# Patient Record
Sex: MALE | Race: WHITE | NOT HISPANIC OR LATINO | Employment: OTHER | URBAN - METROPOLITAN AREA
[De-identification: names, ages, dates, MRNs, and addresses within clinical notes are randomized per-mention and may not be internally consistent; named-entity substitution may affect disease eponyms.]

---

## 2017-04-24 ENCOUNTER — OFFICE VISIT (OUTPATIENT)
Dept: FAMILY MEDICINE | Facility: CLINIC | Age: 57
End: 2017-04-24
Payer: MEDICARE

## 2017-04-24 VITALS
HEIGHT: 78 IN | OXYGEN SATURATION: 98 % | WEIGHT: 162 LBS | HEART RATE: 89 BPM | DIASTOLIC BLOOD PRESSURE: 78 MMHG | TEMPERATURE: 98.7 F | SYSTOLIC BLOOD PRESSURE: 120 MMHG | RESPIRATION RATE: 18 BRPM | BODY MASS INDEX: 18.74 KG/M2

## 2017-04-24 DIAGNOSIS — M62.830 BACK MUSCLE SPASM: ICD-10-CM

## 2017-04-24 DIAGNOSIS — M54.40 CHRONIC MIDLINE LOW BACK PAIN WITH SCIATICA, SCIATICA LATERALITY UNSPECIFIED: ICD-10-CM

## 2017-04-24 DIAGNOSIS — G89.29 CHRONIC MIDLINE LOW BACK PAIN WITH SCIATICA, SCIATICA LATERALITY UNSPECIFIED: ICD-10-CM

## 2017-04-24 DIAGNOSIS — M54.16 LUMBAR RADICULOPATHY: ICD-10-CM

## 2017-04-24 PROCEDURE — 99214 OFFICE O/P EST MOD 30 MIN: CPT | Performed by: FAMILY MEDICINE

## 2017-04-24 RX ORDER — OXYCODONE HYDROCHLORIDE AND ASPIRIN 4.8355; 325 MG/1; MG/1
1 TABLET ORAL EVERY 4 HOURS PRN
Qty: 90 TABLET | Refills: 0 | Status: SHIPPED | OUTPATIENT
Start: 2017-04-24 | End: 2019-01-17

## 2017-04-24 ASSESSMENT — PAIN SCALES - GENERAL: PAINLEVEL: NO PAIN (0)

## 2017-04-24 NOTE — NURSING NOTE
"Chief Complaint   Patient presents with     Back Pain       Initial /78  Pulse 89  Temp 98.7  F (37.1  C) (Tympanic)  Resp 18  Ht 6' 6.1\" (1.984 m)  Wt 162 lb (73.5 kg)  SpO2 98%  BMI 18.67 kg/m2 Estimated body mass index is 18.67 kg/(m^2) as calculated from the following:    Height as of this encounter: 6' 6.1\" (1.984 m).    Weight as of this encounter: 162 lb (73.5 kg).  BP completed using cuff size: sindhu Israel MA      "

## 2017-04-24 NOTE — MR AVS SNAPSHOT
"              After Visit Summary   4/24/2017    Gomez Gutierrez    MRN: [id number]           Patient Information     Date Of Birth          1960        Visit Information        Provider Department      4/24/2017 5:20 PM Francis Lane MD [facility]        Today's Diagnoses     Back muscle spasm        Chronic midline low back pain with sciatica, sciatica laterality unspecified        Lumbar radiculopathy           Follow-ups after your visit        Who to contact     If you have questions or need follow up information about today's clinic visit or your schedule please contact [facility] directly at [phone].  Normal or non-critical lab and imaging results will be communicated to you by [employer], letter or phone within 4 business days after the clinic has received the results. If you do not hear from us within 7 days, please contact the clinic through [employer] or phone. If you have a critical or abnormal lab result, we will notify you by phone as soon as possible.  Submit refill requests through [employer] or call your pharmacy and they will forward the refill request to us. Please allow 3 business days for your refill to be completed.          Additional Information About Your Visit        MyChart Information     [employer] gives you secure access to your electronic health record. If you see a primary care provider, you can also send messages to your care team and make appointments. If you have questions, please call your primary care clinic.  If you do not have a primary care provider, please call [phone] and they will assist you.        Care EveryWhere ID     This is your Care EveryWhere ID. This could be used by other organizations to access your [facility] medical records  [id number]        Your Vitals Were     Pulse Temperature Respirations Height Pulse Oximetry BMI (Body Mass Index)    89 98.7  F (37.1  C) (Tympanic) 18 6' 6.1\" (1.984 m) 98% 18.67 kg/m2       Blood " Pressure from Last 3 Encounters:   04/24/17 120/78   08/17/16 110/60   02/10/15 124/84    Weight from Last 3 Encounters:   04/24/17 162 lb (73.5 kg)   08/17/16 163 lb (73.9 kg)   02/10/15 162 lb (73.5 kg)              Today, you had the following     No orders found for display         Where to get your medicines      Some of these will need a paper prescription and others can be bought over the counter.  Ask your nurse if you have questions.     Bring a paper prescription for each of these medications     oxyCODONE-aspirin 4.8355-325 MG per tablet          Primary Care Provider Office Phone # Fax #    Francis Lane [phone]       [facility] [address] [place] [address]        Thank you!     Thank you for choosing [facility]  for your care. Our goal is always to provide you with excellent care. Hearing back from our patients is one way we can continue to improve our services. Please take a few minutes to complete the written survey that you may receive in the mail after your visit with us. Thank you!             Your Updated Medication List - Protect others around you: Learn how to safely use, store and throw away your medicines at www.disposemymeds.org.          This list is accurate as of: 4/24/17 11:59 PM.  Always use your most recent med list.                   Brand Name Dispense Instructions for use    ALEVE 220 MG tablet   Generic drug:  naproxen sodium      1 TABLET EVERY 12 HOURS AS NEEDED       oxyCODONE-aspirin 4.8355-325 MG per tablet    PERCODAN    90 tablet    Take 1 tablet by mouth every 4 hours as needed for pain       tiZANidine 4 MG capsule    ZANAFLEX    90 capsule    1 TABLET 3 TIMES DAILY

## 2017-05-03 NOTE — PROGRESS NOTES
Previous work comp , Mr. Simon, had him sign off on this case back when the patient was under the influence of narcotic pain medications and within days of his mother dying.  The patient was still having significant issues with pain and lumbar radiculopathy at the time.  See the letter below that I send to his [facility] worker on 3/3/05.  [address] [place]  [address]  Tel. [phone]    March 3, 2005        Dionne Wilkerson, [facility]    RE:  Gomez Gutierrez  [address] [place] [address]          Dear Ms. Wilkerson,    This letter is in regards to Gomez Gutierrez, a patient of [facility] whom you are familiar with  had a workers comp injury stemming from June 6, 2001 that has caused him persistent problems to this date.  The patient, as you know,  has undergone lumbar surgery per Neurosurgery's recommendations, but despite this has continued to have low back pain with radiculopathy.  I spent a prolonged period of time with Mr. Gutierrez recently in the clinic reviewing his medical records with him.  I spent an additional hour after he was gone reviewing MRI scans and letters from various consulting physicians.      It is my professional opinion that Mr. Gutierrez's herniated disc and nerve compromise at the L3, L4 level is a direct result of his initial injury back in June 2001.  It is clear by the radiographs and MRI reports that I have reviewed that he had an L3, L4 disc injury with herniation within five months of his initial MRI scan that was done in June of 2001.      I have reviewed a number of consultation notes from various physicians that have come to the same conclusion. In fact, on May 17, 2004, Dr. Bassem Mendoza recommended that Mr. Gutierrez have a microdiskectomy at the L3, L4 level to relieve pressure off the nerve that is being impinged by the disc herniation.  Despite that neurosurgical recommendation, no surgery has been approved by workers comp in order to  alleviate this gentleman's pain.      It is true that on his initial MRI scan that was done in June 2001 that there was not a true herniation at the L3, L4, but there was a high signal intensity suggesting an annular tear in that disc, so an injury was present.  It was not until later on his MRI scan dated November 2001 that the disc herniation was present.      The fact that there was no nerve impingement at that time does not mean that this current impingement does not stem back from that initial injury in June 2001.      I am in full support of this patient and believe he is truly in pain and has clinical symptoms supporting radiculopathy from nerve impingement of L3, L4 deserving of surgery to help relieve his symptoms.        Page 2    If there is further information that I may help you with, I would be more than willing to help supply that for you.    Along with this letter I am sending my clinic dictation from February 11, 2005 for your review.  I hope this along with the neurosurgical consultation note from Dr. Mendoza will be helpful in allowing workers comp to allow this patient the surgery that is needed, an injury ultimately resulting in a herniation of the L3-L4 disc and nerve compression.  This clearly is stemming from his initial injury back in June 2001.          Sincerely,      Francis Lane MD      Patient seen, note dictated, (ID#[id number]).  Electronically signed by:  Francis Lane M.D.  5/3/2017    Letter dictated, (ID#[id number]).  Electronically signed by:  Francis Lane M.D.  5/3/2017

## 2017-05-04 NOTE — PROGRESS NOTES
SUBJECTIVE:  Gomez Gutierrez is in today for follow-up of his Workmen's Comp issue.  This is a gentleman whom I have known for many years, and has had issues with his back since 2001.  He was working, for the [employer] at that time, and has had regular routine follow-ups.  Unfortunately, Gomez's surgery that was done, was not done at the proper level, and he has had permanent nerve damage from the nerve that was never decompressed.  His initial laminectomy was at L4-5.  He had another laminectomy at L3-L4 in 2007, and then a repeat laminectomy and discectomy in 11/2010.  He had been living in [place], but comes back to [place] two or three times a year, and has been seeing Dr. oNbles at the [facility].  He had some procedures with Dr. Nobles in 2015 with decompression of the area again, but unfortunately, Gomez has had some permanent nerve damage on that left side and has significant atrophy of the left lower extremity musculature, particularly in the calf and shin region.  He is very hyperreflexive secondary to nerve damage and he has a permanent foot drop on the left.  He continues to have pain with his back.  He has a new Workmen's Comp  that is working with him here in [place] to try to get this all settled, but there have been a lot of issues that have gone on over the years that they are trying to sort out.  He definitely needs an AFO for his left foot but works for a [employer] continues to deny this.  Unfortunately, because of foot drop, he is tripping and falling and has had a couple of ER visits in [place] because of this.  One of his last falls was shortly after seeing me at his last visit in 08/2016, and he actually still had abrasions and bruising on his left hip and shoulder from that fall.  He does not do a lot outside of the home now, just because of his fear of falling, and does not want to have any further injuries or break any other bones.  He currently does use  medication for pain, very sparingly, using Zanaflex 4 mg up to three times a day for muscle spasms, and he has Percodan/oxycodone and aspirin at 4.8/325 per tablet, and he takes 1 up to every 4 hours, but 90 tablets will last him upwards of six months, so he does not use it very often.  He does use some Aleve 1 tablet every 12 hours to help with some of the inflammatory pain.      His status really has not changed since I saw him back in August.  He continues to have the left lower back pain with muscle spasms and lumbar radiculopathy down that left foot with the foot drop.  He continues to be exceedingly frustrated and has done a good job of documenting everything that he has done at clinics both here in [place].  He has documentation of everything that has ever been done to him, dates and times, and that has all been kept in a file for him at home.      OBJECTIVE:  On exam today, he continues to have significant atrophy of the calf and anterior shin muscles of the left lower extremity.  He does not have any dorsiflexion of the foot with some plantar flexion, so a permanent foot drop.  He is hyperreflexive on the left when compared to the right.      PLAN:  I gave him one more refill of his Percodan and tizanidine refills for the year.  He and his wife are moving back to the [place] area, and then will split their time between [place] and [place].  I did get a request from his new , Samy Anderson, , [address] [place] [address], [address], [place] [address].  Telephone number [phone].  Fax is [phone].  Email (website) is [employer].  I will dictate a letter to Mr. Anderson regarding the request that was sent to me.      TIME:  I spent 30 minutes with the patient and his wife discussing his situation, and spent more than 50% of the time discussing his chronic pain and foot drop.         AIDEE MENDEZ MD             D: 05/03/2017 11:30   T: 05/04/2017 07:00   MT: [id number]       Name:     TORIBIO TRAN   MRN:      [id number]        Account:      [id number]   :      1960           Visit Date:   2017      Document: [id number]

## 2017-05-04 NOTE — PROGRESS NOTES
May 3, 2017      Samy Anderson,    [address]   [facility] [address]      RE:  Gomez Gutierrez   # .   :  1960      Dear  Justin:      I received your letter requesting information regarding Gomez Gutierrez who has been a patient of mine for many years.  I have been seeing Mr. Gutierrez since his initial Workman's Compensation injury in .  He actually started care with one of my partners CAMERON Viveros, but transferred to me shortly afterwards.  I have been seeing him since.      Regarding his diagnosis Mr. Gutierrez has chronic midline low back pain with lumbar radiculopathy down the left.  He has permanent nerve damage to the lumbar nerve affecting his lower extremity and has significant atrophy of the calf muscles which has resulted in a permanent foot drop on the left.  He has no strength in dorsiflexion and has some plantar flexion.  The atrophy of the muscles on the left is significant when compared to the right leg, and this is a direct result of delayed decompression of one of the lumbar nerves.        His initial work injury was on  when he was working at the [employer].  He has had routine and regular follow-ups, but unfortunately it was discovered that the initial laminectomy that was done at L4-L5 should have been at the level above that at L3-L4.  Unfortunately the second surgery was delayed to the point where he suffered permanent nerve damage of that nerve and has never regained nerve function after the second laminectomy was done.  Mr. Gutierrez has kept incredible records of all of his surgical procedures, all of his consultations with the various spine clinics and doctors and recommendations that were made and even has documentation of conversations with spine surgeons saying that the initial decompression was at the wrong level and should have been done at the level above that.  I would ask that you  obtain those records from Mr. Gutierrez because he has kept impeccable records over the last 16 years.        He has had numerous surgical procedures done over the years.  The initial decompression was done in January 2002 at L4-SL51.  He then had a laminectomy/diskectomy done at L3-L4 in October 2006 and then had a repeat laminectomy/diskectomy done at L4-L5 in November 2010.  He has had surgery done by Dr. Nobles at the [facility] in November 2015 to clean out scar tissue and bone spurs to help alleviate some of his pain, but he has never regained any of the nerve function in his left foot and calf muscles.  He has undergone multiple evaluations in Physical Therapy and Rehabilitation throughout the years.        I saw Mr. Gutierrez in August 2016 at which time I agreed with Dr. Nobles's recommendation for an AFO brace for Mr. Gutierrez's left foot.  The patient has the permanent foot drop which does cause him to trip and stumble, and he has had a number of falls over the past years secondary to this.  Unfortunately his Workman's Compensation carrier has denied the AFO claim, and he has still not been able to obtain an AFO.      As to prescriptions for Mr. Gutierrez, he has been managed primarily with muscle relaxants.  His latest is Tizanidine 4 mg up to 3 times a day as needed.  He typically gets 90 capsules at a time, and those last him a number of months before he needs refills.  He also gets oxycodone and aspirin or Percodan and takes 1 tablet up to every 4 hours as needed for pain.  A 90-tablet supply will often last him six months.  He uses an over-the-counter anti-inflammatory such as Aleve to help control some of his pain, but essentially he has just learned to deal with his chronic pain and has never become dependent on narcotics or chronic pain medications.      In my professional opinion the patient's permanent nerve damage and foot drop are directly related to his work injury in 2001.  I think he was  mismanaged surgically.  If things had been addressed judiciously and in a proper time frame I believe Mr. Gutierrez would not have the permanent nerve damage that he has.      He has very well-documented evaluations and consultations from various back surgeons regarding this same issue, and I think the consensus from all of us is that if it had been managed properly in [address]44-[address]33 a lot of this permanent nerve injury could have been avoided.  He has seen me once every 6 months for the past 10-12 years at least for follow-ups and documentation of his progress or lack of progress and for refills of his medications.  This care and treatment has been necessary and reasonable to help relieve some of his pain and discomfort.  In no way has it been used to try to cure him of his problems because I think that now that he has the permanent atrophy of the muscles secondary to nerve damage there is no cure for him.  We just need to try to manage the situation better than we have in the past.        If you have further questions for me or if you need more clarification of this letter, please feel free to contact me at my office number which is [phone] or by Fax at [phone].         Sincerely,      AIDEE MENDEZ MD             D: 2017 11:41   T: 2017 05:46   MT: [id number]      Name:     TORIBIO GUTIERREZ   MRN:      [id number]        Account:      [id number]   :      1960      Document: [id number]

## 2017-05-16 ENCOUNTER — OFFICE VISIT (OUTPATIENT)
Dept: FAMILY MEDICINE | Facility: CLINIC | Age: 57
End: 2017-05-16
Payer: OTHER MISCELLANEOUS

## 2017-05-16 DIAGNOSIS — M21.372 FOOT DROP, LEFT: ICD-10-CM

## 2017-05-16 DIAGNOSIS — M54.42 CHRONIC MIDLINE LOW BACK PAIN WITH LEFT-SIDED SCIATICA: Primary | ICD-10-CM

## 2017-05-16 DIAGNOSIS — G89.29 CHRONIC MIDLINE LOW BACK PAIN WITH LEFT-SIDED SCIATICA: Primary | ICD-10-CM

## 2017-05-16 DIAGNOSIS — M54.16 LUMBAR RADICULOPATHY: ICD-10-CM

## 2017-05-16 DIAGNOSIS — M79.2 NEUROPATHIC PAIN SYNDROME (NON-HERPETIC): ICD-10-CM

## 2017-05-16 PROCEDURE — 99080 SPECIAL REPORTS OR FORMS: CPT | Performed by: FAMILY MEDICINE

## 2017-05-16 NOTE — MR AVS SNAPSHOT
After Visit Summary   5/16/2017    Gomez Gutierrez    MRN: [id number]           Patient Information     Date Of Birth          1960        Visit Information        Provider Department      5/16/2017 11:10 AM Francis Lane MD [facility]        Today's Diagnoses     Chronic midline low back pain with left-sided sciatica    -  1    Lumbar radiculopathy        Neuropathic pain syndrome (non-herpetic)        Foot drop, left           Follow-ups after your visit        Who to contact     If you have questions or need follow up information about today's clinic visit or your schedule please contact [facility] directly at [phone].  Normal or non-critical lab and imaging results will be communicated to you by MyChart, letter or phone within 4 business days after the clinic has received the results. If you do not hear from us within 7 days, please contact the clinic through [employer] or phone. If you have a critical or abnormal lab result, we will notify you by phone as soon as possible.  Submit refill requests through [employer] or call your pharmacy and they will forward the refill request to us. Please allow 3 business days for your refill to be completed.          Additional Information About Your Visit        MyChart Information     [employer] gives you secure access to your electronic health record. If you see a primary care provider, you can also send messages to your care team and make appointments. If you have questions, please call your primary care clinic.  If you do not have a primary care provider, please call [phone] and they will assist you.        Care EveryWhere ID     This is your Care EveryWhere ID. This could be used by other organizations to access your [facility] medical records  [id number]         Blood Pressure from Last 3 Encounters:   04/24/17 120/78   08/17/16 110/60   02/10/15 124/84    Weight from Last 3 Encounters:   04/24/17 162 lb (73.5  kg)   08/17/16 163 lb (73.9 kg)   02/10/15 162 lb (73.5 kg)              We Performed the Following     SPECIAL REPORTS OR FORMS        Primary Care Provider Office Phone # Fax #    Francis Lane [phone]       [facility] [address] [place] [address]        Thank you!     Thank you for choosing [facility]  for your care. Our goal is always to provide you with excellent care. Hearing back from our patients is one way we can continue to improve our services. Please take a few minutes to complete the written survey that you may receive in the mail after your visit with us. Thank you!             Your Updated Medication List - Protect others around you: Learn how to safely use, store and throw away your medicines at www.disposemymeds.org.          This list is accurate as of: 5/16/17  4:51 PM.  Always use your most recent med list.                   Brand Name Dispense Instructions for use    ALEVE 220 MG tablet   Generic drug:  naproxen sodium      1 TABLET EVERY 12 HOURS AS NEEDED       oxyCODONE-aspirin 4.8355-325 MG per tablet    PERCODAN    90 tablet    Take 1 tablet by mouth every 4 hours as needed for pain       tiZANidine 4 MG capsule    ZANAFLEX    90 capsule    1 TABLET 3 TIMES DAILY

## 2017-05-16 NOTE — PROGRESS NOTES
Letter sent for his .  I spent 30-40 minutes coordinating the content of the letter and dictating it for the patient's .    Electronically signed by:  Francis Lane M.D.  5/16/2017

## 2018-11-27 ENCOUNTER — TELEPHONE (OUTPATIENT)
Dept: FAMILY MEDICINE | Facility: CLINIC | Age: 58
End: 2018-11-27

## 2018-11-27 NOTE — TELEPHONE ENCOUNTER
Reason for Call:  Other appointment    Detailed comments: patient states he has a history with Dr. Lane and is now back from living in [place]. Patient does have an appointment in January to re establish care. Patient is having back issues and would like to be seen this week if possible, states that he is in a lot of pain, please call and advise      Phone Number Patient can be reached at: Cell number on file:    Telephone Information:   Mobile [phone]       Best Time: any    Can we leave a detailed message on this number? YES    Call taken on 11/27/2018 at 1:26 PM by Noelle Pierre

## 2018-11-28 NOTE — TELEPHONE ENCOUNTER
I really do not have anything this week due to my surgery schedule.  The earliest I could see him would be next Wednesday 11/5/18 during my procedure time.  I would need to have those procedure times blocked for him (10:40 and 11:00 am slots.)    Electronically signed by:  Francis Lane M.D.  11/27/2018

## 2018-11-28 NOTE — TELEPHONE ENCOUNTER
Called and LM for patient to call back. Appointment was made for 11/5/2018 @ 11:00am. Asked patient to call back to confirm this appointment works for him.   Betty Pompa MA

## 2018-11-29 ENCOUNTER — TRANSFERRED RECORDS (OUTPATIENT)
Dept: HEALTH INFORMATION MANAGEMENT | Facility: CLINIC | Age: 58
End: 2018-11-29

## 2018-12-05 ENCOUNTER — OFFICE VISIT (OUTPATIENT)
Dept: FAMILY MEDICINE | Facility: CLINIC | Age: 58
End: 2018-12-05
Payer: MEDICARE

## 2018-12-05 ENCOUNTER — DOCUMENTATION ONLY (OUTPATIENT)
Dept: FAMILY MEDICINE | Facility: CLINIC | Age: 58
End: 2018-12-05

## 2018-12-05 VITALS
RESPIRATION RATE: 18 BRPM | HEIGHT: 78 IN | SYSTOLIC BLOOD PRESSURE: 102 MMHG | HEART RATE: 70 BPM | OXYGEN SATURATION: 98 % | WEIGHT: 166 LBS | TEMPERATURE: 98.2 F | BODY MASS INDEX: 19.21 KG/M2 | DIASTOLIC BLOOD PRESSURE: 68 MMHG

## 2018-12-05 DIAGNOSIS — Z76.89 ENCOUNTER TO ESTABLISH CARE: Primary | ICD-10-CM

## 2018-12-05 DIAGNOSIS — H40.003 GLAUCOMA SUSPECT, BILATERAL: ICD-10-CM

## 2018-12-05 DIAGNOSIS — Z13.1 SCREENING FOR DIABETES MELLITUS: ICD-10-CM

## 2018-12-05 DIAGNOSIS — M54.42 CHRONIC MIDLINE LOW BACK PAIN WITH LEFT-SIDED SCIATICA: ICD-10-CM

## 2018-12-05 DIAGNOSIS — M21.372 FOOT DROP, LEFT: ICD-10-CM

## 2018-12-05 DIAGNOSIS — Z13.220 LIPID SCREENING: ICD-10-CM

## 2018-12-05 DIAGNOSIS — Z12.5 SCREENING FOR PROSTATE CANCER: ICD-10-CM

## 2018-12-05 DIAGNOSIS — Z87.891 PERSONAL HISTORY OF NICOTINE DEPENDENCE: ICD-10-CM

## 2018-12-05 DIAGNOSIS — G89.29 CHRONIC MIDLINE LOW BACK PAIN WITH LEFT-SIDED SCIATICA: ICD-10-CM

## 2018-12-05 PROCEDURE — 99215 OFFICE O/P EST HI 40 MIN: CPT | Performed by: FAMILY MEDICINE

## 2018-12-05 ASSESSMENT — PAIN SCALES - GENERAL: PAINLEVEL: MODERATE PAIN (4)

## 2018-12-05 ASSESSMENT — ANXIETY QUESTIONNAIRES
3. WORRYING TOO MUCH ABOUT DIFFERENT THINGS: NOT AT ALL
5. BEING SO RESTLESS THAT IT IS HARD TO SIT STILL: NOT AT ALL
1. FEELING NERVOUS, ANXIOUS, OR ON EDGE: NOT AT ALL
GAD7 TOTAL SCORE: 0
2. NOT BEING ABLE TO STOP OR CONTROL WORRYING: NOT AT ALL
IF YOU CHECKED OFF ANY PROBLEMS ON THIS QUESTIONNAIRE, HOW DIFFICULT HAVE THESE PROBLEMS MADE IT FOR YOU TO DO YOUR WORK, TAKE CARE OF THINGS AT HOME, OR GET ALONG WITH OTHER PEOPLE: NOT DIFFICULT AT ALL
7. FEELING AFRAID AS IF SOMETHING AWFUL MIGHT HAPPEN: NOT AT ALL
6. BECOMING EASILY ANNOYED OR IRRITABLE: NOT AT ALL

## 2018-12-05 ASSESSMENT — PATIENT HEALTH QUESTIONNAIRE - PHQ9
5. POOR APPETITE OR OVEREATING: NOT AT ALL
SUM OF ALL RESPONSES TO PHQ QUESTIONS 1-9: 1

## 2018-12-05 NOTE — PROGRESS NOTES
"  SUBJECTIVE:   Gomez Gutierrez is a 58 year old male who presents to clinic today for the following health issues:      ED/UC Followup:    Facility:  [place]  Date of visit: 11/29/2018  Reason for visit: Back Pain  Current Status: stable         PROBLEMS TO ADD ON...  45 pack year history of smoking, had some \"spots\" on his lungs on xray.    Was diagnosed with glaucoma but not using drops anymore and has not had any follow-up with eye doctor.  He was also told that he was borderline diabetic but has not had any follow-up for that either.  Back has been stable, but continues to have chronic pain.  He does not use any regular opioids for pain management.  He had a prescription for Percodan back in April 2017 and still has a few tablets at home from that prescription but has not needed refills in over a year.  He does have some muscle relaxants that he does use periodically.  He had an ED visit in [place] for his right hip.  He had sneezed and felt like he threw his hip out.  He went in and received a Toradol injection and his pain improved.  He has had no more issues since this.    Problem list and histories reviewed & adjusted, as indicated.  Additional history: as documented    Patient Active Problem List   Diagnosis     Tobacco use disorder     Psychosexual dysfunction with inhibited sexual excitement     iamLUMBAR DISC DISPLACEMENT     iamPOSTSURGICAL STATES NEC     Obstructive sleep apnea     Neuropathic pain syndrome (non-herpetic)     CARDIOVASCULAR SCREENING; LDL GOAL LESS THAN 160     Foot drop, left     Lumbar radiculopathy     Chronic midline low back pain with left-sided sciatica     Past Surgical History:   Procedure Laterality Date     BACK SURGERY  11/2015    cleaned out bone spurs     C ECHO HEART XTHORACIC,COMPLETE, W/O DOPPLER  02/04/07    normal cardiac stress echo test     C ESTEBAN W/O FACETEC FORAMOT/DSKC 1/2 VRT SEG, LUMBAR  1/02    L4-5     C REPAIR LUMBAR HERNIA  1986    L5-4 L5-S1     " "HC COLONOSCOPY W BIOPSY  11/05/09     HC REMOVE TONSILS/ADENOIDS,<13 Y/O      T & A <11y.o.     LAMINECT/DISCECTOMY, LUMBAR  10/19/06    L3-4     LAMINECT/DISCECTOMY, LUMBAR  11/10    left sided L4-5     REMOVAL OF SPERM DUCT(S)      Vasectomy     ROTATOR CUFF REPAIR RT/LT  3/12    right sided       Social History   Substance Use Topics     Smoking status: Current Every Day Smoker     Packs/day: 1.00     Years: 45.00     Types: Cigarettes     Smokeless tobacco: Never Used      Comment:  wants to quit     Alcohol use 5.0 oz/week      Comment: 1 beer every 3 wks     Family History   Problem Relation Age of Onset     Respiratory Mother      emphysema     Arthritis Father      Depression Father      Heart Failure Father 80     CHF         Current Outpatient Prescriptions   Medication Sig Dispense Refill     ALEVE 220 MG OR TABS 1 TABLET EVERY 12 HOURS AS NEEDED       oxyCODONE-aspirin (PERCODAN) 4.8355-325 MG per tablet Take 1 tablet by mouth every 4 hours as needed for pain 90 tablet 0     tiZANidine (ZANAFLEX) 4 MG capsule 1 TABLET 3 TIMES DAILY 90 capsule 6     Allergies   Allergen Reactions     No Known Drug Allergies      No lab results found.   BP Readings from Last 3 Encounters:   12/05/18 102/68   04/24/17 120/78   08/17/16 110/60    Wt Readings from Last 3 Encounters:   12/05/18 166 lb (75.3 kg)   04/24/17 162 lb (73.5 kg)   08/17/16 163 lb (73.9 kg)                    Reviewed and updated as needed this visit by clinical staff  Tobacco  Allergies  Meds       Reviewed and updated as needed this visit by Provider         ROS:  Constitutional, HEENT, cardiovascular, pulmonary, gi and gu systems are negative, except as otherwise noted.    OBJECTIVE:     /68  Pulse 70  Temp 98.2  F (36.8  C) (Temporal)  Resp 18  Ht 6' 5.5\" (1.969 m)  Wt 166 lb (75.3 kg)  SpO2 98%  BMI 19.43 kg/m2  Body mass index is 19.43 kg/(m^2).  GENERAL: healthy, alert and no distress  NECK: no adenopathy, no asymmetry, masses, " or scars and thyroid normal to palpation  RESP: lungs clear to auscultation - no rales, rhonchi or wheezes  CV: regular rate and rhythm, normal S1 S2, no S3 or S4, no murmur, click or rub, no peripheral edema and peripheral pulses strong  MS: no gross musculoskeletal defects noted, no edema    Diagnostic Test Results:  Labs are pending from today.    ASSESSMENT/PLAN:   (Z76.89) Encounter to establish care  (primary encounter diagnosis)  Comment: Patient is reestablishing care.  He had moved down to the [place] area for a number of years and has seen me periodically ever since but now is back to [facility] and wants to reestablish care with me as his primary provider.  Plan: See new issues below.    (H40.003) Glaucoma suspect, bilateral  Comment: He was diagnosed with glaucoma and has stopped using his eyedrops.  His vision does not seem to be impaired but I explained to him that glaucoma is a silent disease and can cause blindness if gone untreated.  Plan: OPHTHALMOLOGY ADULT REFERRAL        We will make referral to the ophthalmology clinic so that he can be reevaluated and started back on appropriate therapy.    (Z13.1) Screening for diabetes mellitus  Comment: He is borderline diabetic down in [place] so needs follow-up for this.  His mother was diabetic.  Plan: Basic metabolic panel        Labs are pending today.    (Z13.220) Lipid screening  Comment: These were drawn a basic profile on him he has not had a screening lipid for a number of years so we will do that today also.  He is fasting today.  Plan: Lipid panel reflex to direct LDL Fasting        Labs are pending    (Z12.5) Screening for prostate cancer  Comment: He has never had a PSA screen so again since we are drawing labs today he is willing to do that today also.  Plan: PSA, SCREENING        Drawn.    (Z87.891) Personal history of nicotine dependence   Comment: Patient has been smoking for 45+ years and is at least a pack per day sometimes  "heavier.  Done in [place] they were following a chest x-ray on him that showed some spots on his lungs according to he and his wife.  He has never had a CT of his chest.  Plan: CT Chest Lung Cancer Scrn Low Dose wo        We discussed that because of his smoking history that is greater than 60-vibf-ibpi history and he is more than 55 years of age he would qualify for a yearly CT of his chest to screen for lung cancer.  We will get his initial one set up  to be done in the near future.    (M54.42,  G89.29) Chronic midline low back pain with left-sided sciatica  (M21.372) Foot drop, left  Comment: His pain and foot drop have not changed and are stable at this point.  He has got permanent nerve damage that is because that left foot drop.  He is essentially off narcotics and uses them very rarely just for excruciating pain.  Plan: Down in [place] he did use oral forms of medical marijuana which was legal and he still is using some of the topical oils which does seem to help.  He is going to look into medical marijuana clinic in [place] and reevaluate to see if he would qualify for it here in [place].     Tobacco Cessation:   reports that he has been smoking Cigarettes.  He has a 45.00 pack-year smoking history. He has never used smokeless tobacco.  Tobacco Cessation Action Plan: Information offered: Patient not interested at this time    BMI:   Estimated body mass index is 19.43 kg/(m^2) as calculated from the following:    Height as of this encounter: 6' 5.5\" (1.969 m).    Weight as of this encounter: 166 lb (75.3 kg).       Electronically signed by:  Francis Lane M.D.  12/5/2018    "

## 2018-12-05 NOTE — MR AVS SNAPSHOT
After Visit Summary   12/5/2018    Gomez Gutierrez    MRN: [id number]           Patient Information     Date Of Birth          1960        Visit Information        Provider Department      12/5/2018 11:00 AM Francis Lane MD [facility]        Today's Diagnoses     Encounter to establish care    -  1    Glaucoma suspect, bilateral        Screening for diabetes mellitus        Lipid screening        Screening for prostate cancer        Personal history of nicotine dependence         Chronic midline low back pain with left-sided sciatica        Foot drop, left           Follow-ups after your visit        Additional Services     OPHTHALMOLOGY ADULT REFERRAL       Your provider has referred you to: N: [facility] [phone]  [email or web address]    Please be aware that coverage of these services is subject to the terms and limitations of your health insurance plan.  Call member services at your health plan with any benefit or coverage questions.      Please bring the following with you to your appointment:    (1) Any X-Rays, CTs or MRIs which have been performed.  Contact the facility where they were done to arrange for  prior to your scheduled appointment.    (2) List of current medications  (3) This referral request   (4) Any documents/labs given to you for this referral                  Your next 10 appointments already scheduled     Dec 11, 2018  1:15 PM CST   CT CHEST LUNG CANCER SCREEN with PHCT1   [facility] CT Scan ([facility])    [address] [place] [id number] [phone]           How do I prepare for my exam? (Food and drink instructions) No Food and Drink Restrictions.  How do I prepare for my exam? (Other instructions) You do not need to do anything special to prepare for this exam. For a sinus scan: Use your nose spray (nasal decongestant spray) as directed.  What  should I wear: Please wear loose clothing, such as a sweat suit or jogging clothes. Avoid snaps, zippers and other metal. We may ask you to undress and put on a hospital gown.  How long does the exam take: Most scans take less than 20 minutes.  What should I bring: Please bring any scans or X-rays taken at other hospitals, if similar tests were done. Also bring a list of your medicines, including vitamins, minerals and over-the-counter drugs. It is safest to leave personal items at home.  Do I need a : No  is needed.  What do I need to tell my doctor? Be sure to tell your doctor: * If you have any allergies. * If there s any chance you are pregnant. * If you are breastfeeding.  What should I do after the exam: No restrictions, You may resume normal activities.  What is this test: A CT (computed tomography) scan is a series of pictures that allows us to look inside your body. The scanner creates images of the body in cross sections, much like slices of bread. This helps us see any problems more clearly.  Who should I call with questions: If you have any questions, please call the Imaging Department where you will have your exam. Directions, parking instructions, and other information is available on our website, [email or web address].            Jan 17, 2019 10:10 AM CST   PHYSICAL with Francis Lane MD   [facility] ([facility]    [address] [place] [id number] [phone]              Future tests that were ordered for you today     Open Future Orders        Priority Expected Expires Ordered    CT Chest Lung Cancer Scrn Low Dose wo Routine  12/5/2019 12/5/2018            Who to contact     If you have questions or need follow up information about today's clinic visit or your schedule please contact [facility] directly at [phone].  Normal or non-critical lab and imaging results will be communicated to you by MyChart, letter or  "phone within 4 business days after the clinic has received the results. If you do not hear from us within 7 days, please contact the clinic through [employer] or phone. If you have a critical or abnormal lab result, we will notify you by phone as soon as possible.  Submit refill requests through [employer] or call your pharmacy and they will forward the refill request to us. Please allow 3 business days for your refill to be completed.          Additional Information About Your Visit        [employer] Information     [employer] gives you secure access to your electronic health record. If you see a primary care provider, you can also send messages to your care team and make appointments. If you have questions, please call your primary care clinic.  If you do not have a primary care provider, please call [phone] and they will assist you.        Care EveryWhere ID     This is your Care EveryWhere ID. This could be used by other organizations to access your [place] medical records  [id number]        Your Vitals Were     Pulse Temperature Respirations Height Pulse Oximetry BMI (Body Mass Index)    70 98.2  F (36.8  C) (Temporal) 18 6' 5.5\" (1.969 m) 98% 19.43 kg/m2       Blood Pressure from Last 3 Encounters:   12/05/18 102/68   04/24/17 120/78   08/17/16 110/60    Weight from Last 3 Encounters:   12/05/18 166 lb (75.3 kg)   04/24/17 162 lb (73.5 kg)   08/17/16 163 lb (73.9 kg)              We Performed the Following     Basic metabolic panel     Lipid panel reflex to direct LDL Fasting     OPHTHALMOLOGY ADULT REFERRAL     PSA, SCREENING        Primary Care Provider Office Phone # Fax #    Francis Lane [phone] [address] [place] [address]        Equal Access to Services     [facility] Center[facility] : Spenser Tom, josh sears, luke jernigan. So [facility] [phone].    ATENCIÓN: Si habla español, tiene a rivera disposición " servicios gratuitos de asistencia lingüística. Baljeet goncalves [phone].    We comply with applicable federal civil rights laws and [place] laws. We do not discriminate on the basis of race, color, national origin, age, disability, sex, sexual orientation, or gender identity.            Thank you!     Thank you for choosing [facility]  for your care. Our goal is always to provide you with excellent care. Hearing back from our patients is one way we can continue to improve our services. Please take a few minutes to complete the written survey that you may receive in the mail after your visit with us. Thank you!             Your Updated Medication List - Protect others around you: Learn how to safely use, store and throw away your medicines at www.disposemymeds.org.          This list is accurate as of 12/5/18 12:01 PM.  Always use your most recent med list.                   Brand Name Dispense Instructions for use Diagnosis    ALEVE 220 MG tablet   Generic drug:  naproxen sodium      1 TABLET EVERY 12 HOURS AS NEEDED        oxyCODONE-aspirin 4.8355-325 MG per tablet    PERCODAN    90 tablet    Take 1 tablet by mouth every 4 hours as needed for pain    Back muscle spasm, Chronic midline low back pain with sciatica, sciatica laterality unspecified, Lumbar radiculopathy       tiZANidine 4 MG capsule    ZANAFLEX    90 capsule    1 TABLET 3 TIMES DAILY    Back muscle spasm, Chronic midline low back pain with sciatica, sciatica laterality unspecified, Lumbar radiculopathy

## 2018-12-05 NOTE — PROGRESS NOTES
This patient did not show up for the labs that were ordered today.  I have canceled and reordered as future for one week.  Please contact the patient to come in.  Thank you! -Karina CARRION, Lab

## 2018-12-05 NOTE — NURSING NOTE
Chief Complaint   Patient presents with     Back Pain     About 2 weeks ago he coughed and hurt his back. Went to the ER in [place] and got a Toradal injection and it been better since.     ER F/U     Back Pain 11/29/2018 in [place]     MP/MA

## 2018-12-05 NOTE — PROGRESS NOTES
Let the patient know that he forgot to have his labs drawn.  He can swing by any time in the next few weeks to have them done at his convenience.  If he wants to wait until I see him in January that is fine too.    Electronically signed by:  Francis Lane M.D.  12/5/2018

## 2018-12-07 ASSESSMENT — ANXIETY QUESTIONNAIRES: GAD7 TOTAL SCORE: 0

## 2018-12-10 ENCOUNTER — HOSPITAL ENCOUNTER (OUTPATIENT)
Dept: CT IMAGING | Facility: CLINIC | Age: 58
Discharge: HOME | End: 2018-12-10
Attending: FAMILY MEDICINE | Admitting: FAMILY MEDICINE
Payer: MEDICARE

## 2018-12-10 DIAGNOSIS — Z12.5 SCREENING FOR PROSTATE CANCER: ICD-10-CM

## 2018-12-10 DIAGNOSIS — Z13.1 SCREENING FOR DIABETES MELLITUS: ICD-10-CM

## 2018-12-10 DIAGNOSIS — Z13.220 LIPID SCREENING: ICD-10-CM

## 2018-12-10 DIAGNOSIS — Z87.891 PERSONAL HISTORY OF NICOTINE DEPENDENCE: ICD-10-CM

## 2018-12-10 LAB
ANION GAP SERPL CALCULATED.3IONS-SCNC: 8 MMOL/L (ref 3–14)
BUN SERPL-MCNC: 20 MG/DL (ref 7–30)
CALCIUM SERPL-MCNC: 9 MG/DL (ref 8.5–10.1)
CHLORIDE SERPL-SCNC: 106 MMOL/L (ref 94–109)
CHOLEST SERPL-MCNC: 140 MG/DL
CO2 SERPL-SCNC: 25 MMOL/L (ref 20–32)
CREAT SERPL-MCNC: 0.85 MG/DL (ref 0.66–1.25)
GFR SERPL CREATININE-BSD FRML MDRD: >90 ML/MIN/1.7M2
GLUCOSE SERPL-MCNC: 107 MG/DL (ref 70–99)
HDLC SERPL-MCNC: 74 MG/DL
LDLC SERPL CALC-MCNC: 57 MG/DL
NONHDLC SERPL-MCNC: 66 MG/DL
POTASSIUM SERPL-SCNC: 4.5 MMOL/L (ref 3.4–5.3)
PSA SERPL-ACNC: 0.48 UG/L (ref 0–4)
SODIUM SERPL-SCNC: 139 MMOL/L (ref 133–144)
TRIGL SERPL-MCNC: 43 MG/DL

## 2018-12-10 PROCEDURE — 36415 COLL VENOUS BLD VENIPUNCTURE: CPT | Performed by: FAMILY MEDICINE

## 2018-12-10 PROCEDURE — 80061 LIPID PANEL: CPT | Performed by: FAMILY MEDICINE

## 2018-12-10 PROCEDURE — G0103 PSA SCREENING: HCPCS | Performed by: FAMILY MEDICINE

## 2018-12-10 PROCEDURE — G0297 LDCT FOR LUNG CA SCREEN: HCPCS

## 2018-12-10 PROCEDURE — 80048 BASIC METABOLIC PNL TOTAL CA: CPT | Performed by: FAMILY MEDICINE

## 2019-01-17 ENCOUNTER — OFFICE VISIT (OUTPATIENT)
Dept: FAMILY MEDICINE | Facility: CLINIC | Age: 59
End: 2019-01-17
Payer: MEDICARE

## 2019-01-17 VITALS
DIASTOLIC BLOOD PRESSURE: 66 MMHG | BODY MASS INDEX: 20.07 KG/M2 | HEIGHT: 77 IN | TEMPERATURE: 97.7 F | RESPIRATION RATE: 18 BRPM | WEIGHT: 170 LBS | SYSTOLIC BLOOD PRESSURE: 114 MMHG | OXYGEN SATURATION: 97 % | HEART RATE: 62 BPM

## 2019-01-17 DIAGNOSIS — N52.8 OTHER MALE ERECTILE DYSFUNCTION: ICD-10-CM

## 2019-01-17 DIAGNOSIS — M54.16 LUMBAR RADICULOPATHY: ICD-10-CM

## 2019-01-17 DIAGNOSIS — M54.40 CHRONIC MIDLINE LOW BACK PAIN WITH SCIATICA, SCIATICA LATERALITY UNSPECIFIED: ICD-10-CM

## 2019-01-17 DIAGNOSIS — Z00.00 ROUTINE GENERAL MEDICAL EXAMINATION AT A HEALTH CARE FACILITY: Primary | ICD-10-CM

## 2019-01-17 DIAGNOSIS — Z23 NEED FOR VACCINATION: ICD-10-CM

## 2019-01-17 DIAGNOSIS — G89.29 CHRONIC MIDLINE LOW BACK PAIN WITH SCIATICA, SCIATICA LATERALITY UNSPECIFIED: ICD-10-CM

## 2019-01-17 DIAGNOSIS — L81.9 DISCOLORATION OF SKIN OF HAND: ICD-10-CM

## 2019-01-17 DIAGNOSIS — M62.830 BACK MUSCLE SPASM: ICD-10-CM

## 2019-01-17 PROCEDURE — 99396 PREV VISIT EST AGE 40-64: CPT | Mod: 25 | Performed by: FAMILY MEDICINE

## 2019-01-17 PROCEDURE — G0009 ADMIN PNEUMOCOCCAL VACCINE: HCPCS | Performed by: FAMILY MEDICINE

## 2019-01-17 PROCEDURE — 99213 OFFICE O/P EST LOW 20 MIN: CPT | Mod: 25 | Performed by: FAMILY MEDICINE

## 2019-01-17 PROCEDURE — 90750 HZV VACC RECOMBINANT IM: CPT | Performed by: FAMILY MEDICINE

## 2019-01-17 PROCEDURE — 90472 IMMUNIZATION ADMIN EACH ADD: CPT | Performed by: FAMILY MEDICINE

## 2019-01-17 PROCEDURE — 90732 PPSV23 VACC 2 YRS+ SUBQ/IM: CPT | Performed by: FAMILY MEDICINE

## 2019-01-17 RX ORDER — OXYCODONE HYDROCHLORIDE AND ASPIRIN 4.8355; 325 MG/1; MG/1
1 TABLET ORAL EVERY 4 HOURS PRN
Qty: 90 TABLET | Refills: 0 | Status: SHIPPED | OUTPATIENT
Start: 2019-01-17 | End: 2019-10-01

## 2019-01-17 RX ORDER — SILDENAFIL 50 MG/1
25-50 TABLET, FILM COATED ORAL DAILY PRN
Qty: 30 TABLET | Refills: 6 | Status: SHIPPED | OUTPATIENT
Start: 2019-01-17 | End: 2019-10-01

## 2019-01-17 SDOH — SOCIAL STABILITY: SOCIAL NETWORK: HOW OFTEN DO YOU ATTENT MEETINGS OF THE CLUB OR ORGANIZATION YOU BELONG TO?: NEVER

## 2019-01-17 SDOH — HEALTH STABILITY: MENTAL HEALTH: HOW MANY STANDARD DRINKS CONTAINING ALCOHOL DO YOU HAVE ON A TYPICAL DAY?: 5 OR 6

## 2019-01-17 SDOH — HEALTH STABILITY: MENTAL HEALTH: HOW OFTEN DO YOU HAVE A DRINK CONTAINING ALCOHOL?: 4 OR MORE TIMES A WEEK

## 2019-01-17 SDOH — SOCIAL STABILITY: SOCIAL INSECURITY
WITHIN THE LAST YEAR, HAVE TO BEEN RAPED OR FORCED TO HAVE ANY KIND OF SEXUAL ACTIVITY BY YOUR PARTNER OR EX-PARTNER?: NO

## 2019-01-17 SDOH — ECONOMIC STABILITY: TRANSPORTATION INSECURITY
IN THE PAST 12 MONTHS, HAS THE LACK OF TRANSPORTATION KEPT YOU FROM MEDICAL APPOINTMENTS OR FROM GETTING MEDICATIONS?: NO

## 2019-01-17 SDOH — SOCIAL STABILITY: SOCIAL NETWORK: IN A TYPICAL WEEK, HOW MANY TIMES DO YOU TALK ON THE PHONE WITH FAMILY, FRIENDS, OR NEIGHBORS?: ONCE A WEEK

## 2019-01-17 SDOH — ECONOMIC STABILITY: FOOD INSECURITY: WITHIN THE PAST 12 MONTHS, YOU WORRIED THAT YOUR FOOD WOULD RUN OUT BEFORE YOU GOT MONEY TO BUY MORE.: NEVER TRUE

## 2019-01-17 SDOH — SOCIAL STABILITY: SOCIAL INSECURITY: WITHIN THE LAST YEAR, HAVE YOU BEEN HUMILIATED OR EMOTIONALLY ABUSED IN OTHER WAYS BY YOUR PARTNER OR EX-PARTNER?: NO

## 2019-01-17 SDOH — HEALTH STABILITY: PHYSICAL HEALTH: ON AVERAGE, HOW MANY DAYS PER WEEK DO YOU ENGAGE IN MODERATE TO STRENUOUS EXERCISE (LIKE A BRISK WALK)?: 0 DAYS

## 2019-01-17 SDOH — HEALTH STABILITY: MENTAL HEALTH
STRESS IS WHEN SOMEONE FEELS TENSE, NERVOUS, ANXIOUS, OR CAN'T SLEEP AT NIGHT BECAUSE THEIR MIND IS TROUBLED. HOW STRESSED ARE YOU?: NOT AT ALL

## 2019-01-17 SDOH — HEALTH STABILITY: MENTAL HEALTH: HOW OFTEN DO YOU HAVE 6 OR MORE DRINKS ON ONE OCCASION?: DAILY OR ALMOST DAILY

## 2019-01-17 SDOH — SOCIAL STABILITY: SOCIAL NETWORK: HOW OFTEN DO YOU GET TOGETHER WITH FRIENDS OR RELATIVES?: ONCE A WEEK

## 2019-01-17 SDOH — SOCIAL STABILITY: SOCIAL NETWORK
DO YOU BELONG TO ANY CLUBS OR ORGANIZATIONS SUCH AS CHURCH GROUPS UNIONS, FRATERNAL OR ATHLETIC GROUPS, OR SCHOOL GROUPS?: NO

## 2019-01-17 SDOH — SOCIAL STABILITY: SOCIAL NETWORK: HOW OFTEN DO YOU ATTEND CHURCH OR RELIGIOUS SERVICES?: NEVER

## 2019-01-17 SDOH — ECONOMIC STABILITY: INCOME INSECURITY: HOW HARD IS IT FOR YOU TO PAY FOR THE VERY BASICS LIKE FOOD, HOUSING, MEDICAL CARE, AND HEATING?: NOT VERY HARD

## 2019-01-17 SDOH — ECONOMIC STABILITY: FOOD INSECURITY: WITHIN THE PAST 12 MONTHS, THE FOOD YOU BOUGHT JUST DIDN'T LAST AND YOU DIDN'T HAVE MONEY TO GET MORE.: NEVER TRUE

## 2019-01-17 SDOH — ECONOMIC STABILITY: TRANSPORTATION INSECURITY
IN THE PAST 12 MONTHS, HAS LACK OF TRANSPORTATION KEPT YOU FROM MEETINGS, WORK, OR FROM GETTING THINGS NEEDED FOR DAILY LIVING?: NO

## 2019-01-17 SDOH — SOCIAL STABILITY: SOCIAL NETWORK: ARE YOU MARRIED, WIDOWED, DIVORCED, SEPARATED, NEVER MARRIED, OR LIVING WITH A PARTNER?: MARRIED

## 2019-01-17 SDOH — SOCIAL STABILITY: SOCIAL INSECURITY: WITHIN THE LAST YEAR, HAVE YOU BEEN AFRAID OF YOUR PARTNER OR EX-PARTNER?: NO

## 2019-01-17 SDOH — SOCIAL STABILITY: SOCIAL INSECURITY
WITHIN THE LAST YEAR, HAVE YOU BEEN KICKED, HIT, SLAPPED, OR OTHERWISE PHYSICALLY HURT BY YOUR PARTNER OR EX-PARTNER?: NO

## 2019-01-17 SDOH — HEALTH STABILITY: PHYSICAL HEALTH: ON AVERAGE, HOW MANY MINUTES DO YOU ENGAGE IN EXERCISE AT THIS LEVEL?: 0 MIN

## 2019-01-17 ASSESSMENT — ENCOUNTER SYMPTOMS
EYE PAIN: 0
PALPITATIONS: 0
SHORTNESS OF BREATH: 0
DIZZINESS: 0
HEADACHES: 0
ARTHRALGIAS: 1
FREQUENCY: 0
DYSURIA: 0
COUGH: 0
CHILLS: 0
HEMATURIA: 0
NAUSEA: 0
ABDOMINAL PAIN: 0
HEMATOCHEZIA: 0
DIARRHEA: 0
NERVOUS/ANXIOUS: 0
SORE THROAT: 0
HEARTBURN: 0
JOINT SWELLING: 0
WEAKNESS: 1
MYALGIAS: 1
PARESTHESIAS: 0
CONSTIPATION: 0
FEVER: 0

## 2019-01-17 ASSESSMENT — PAIN SCALES - GENERAL: PAINLEVEL: NO PAIN (0)

## 2019-01-17 ASSESSMENT — MIFFLIN-ST. JEOR: SCORE: 1709.84

## 2019-01-17 NOTE — PATIENT INSTRUCTIONS
Preventive Health Recommendations  Male Ages 50 - 64    Yearly exam:             See your health care provider every year in order to  o   Review health changes.   o   Discuss preventive care.    o   Review your medicines if your doctor has prescribed any.     Have a cholesterol test every 5 years, or more frequently if you are at risk for high cholesterol/heart disease.     Have a diabetes test (fasting glucose) every three years. If you are at risk for diabetes, you should have this test more often.     Have a colonoscopy at age 50, or have a yearly FIT test (stool test). These exams will check for colon cancer.      Talk with your health care provider about whether or not a prostate cancer screening test (PSA) is right for you.    You should be tested each year for STDs (sexually transmitted diseases), if you re at risk.     Shots: Get a flu shot each year. Get a tetanus shot every 10 years.     Nutrition:    Eat at least 5 servings of fruits and vegetables daily.     Eat whole-grain bread, whole-wheat pasta and brown rice instead of white grains and rice.     Get adequate Calcium and Vitamin D.     Lifestyle    Exercise for at least 150 minutes a week (30 minutes a day, 5 days a week). This will help you control your weight and prevent disease.     Limit alcohol to one drink per day.     No smoking.     Wear sunscreen to prevent skin cancer.     See your dentist every six months for an exam and cleaning.     See your eye doctor every 1 to 2 years

## 2019-01-17 NOTE — NURSING NOTE
Prior to injection, verified patient identity using patient's name and date of birth.  Due to injection administration, patient instructed to remain in clinic for 15 minutes  afterwards, and to report any adverse reaction to me immediately.    Shringrix and Pneumovax 23    Drug Amount Wasted:  None.  Vial/Syringe: Single dose vial  Expiration Date:  See immunizations        Screening Questionnaire for Adult Immunization    Are you sick today?   No   Do you have allergies to medications, food, a vaccine component or latex?   No   Have you ever had a serious reaction after receiving a vaccination?   No   Do you have a long-term health problem with heart disease, lung disease, asthma, kidney disease, metabolic disease (e.g. diabetes), anemia, or other blood disorder?   No   Do you have cancer, leukemia, HIV/AIDS, or any other immune system problem?   No   In the past 3 months, have you taken medications that affect  your immune system, such as prednisone, other steroids, or anticancer drugs; drugs for the treatment of rheumatoid arthritis, Crohn s disease, or psoriasis; or have you had radiation treatments?   No   Have you had a seizure, or a brain or other nervous system problem?   No   During the past year, have you received a transfusion of blood or blood     products, or been given immune (gamma) globulin or antiviral drug?   No   For women: Are you pregnant or is there a chance you could become        pregnant during the next month?   No   Have you received any vaccinations in the past 4 weeks?   No     Immunization questionnaire answers were all negative.         Patient instructed to remain in clinic for 15 minutes afterwards, and to report any adverse reaction to me immediately.       Screening performed by Nataliia Santana on 1/17/2019 at 12:26 PM.

## 2019-01-17 NOTE — PROGRESS NOTES
SUBJECTIVE:   CC: Gomez Gutierrez is an 58 year old male who presents for preventative health visit.     Physical   Annual:     Getting at least 3 servings of Calcium per day:  NO    Bi-annual eye exam:  Yes    Dental care twice a year:  NO    Sleep apnea or symptoms of sleep apnea:  Sleep apnea    Diet:  Regular (no restrictions)    Frequency of exercise:  None    Taking medications regularly:  Yes    Medication side effects:  None    Additional concerns today:  Yes    PHQ-2 Total Score: 0      PROBLEMS TO ADD ON...  Still having issues with his sleep apnea and doesn't use his CPAP machine he still has apneic episodes but the patient refuses to have a repeat sleep study.  His wife says he snores a lot but she sleeps in another room and this seems to work fine for them.  He knows that there are risks to sleep apnea including pulmonary hypertension and right-sided heart failure he is going to take these risks.  He continues to smoke a pack cigarettes per day and drinks quite heavily upwards of 4-6 beers per day.  We recently did all of his labs and all of his liver tests were normal and he is got no complaints or other stigmata of alcoholism.    Today's PHQ-2 Score:   PHQ-2 ( 1999 [employer]) 1/17/2019   Q1: Little interest or pleasure in doing things 0   Q2: Feeling down, depressed or hopeless 0   PHQ-2 Score 0   Q1: Little interest or pleasure in doing things Not at all   Q2: Feeling down, depressed or hopeless Not at all   PHQ-2 Score 0       Abuse: Current or Past(Physical, Sexual or Emotional)- No  Do you feel safe in your environment? Yes    Social History     Tobacco Use     Smoking status: Current Every Day Smoker     Packs/day: 1.00     Years: 45.00     Pack years: 45.00     Types: Cigarettes     Smokeless tobacco: Never Used     Tobacco comment:  wants to quit   Substance Use Topics     Alcohol use: Yes     Alcohol/week: 5.0 oz     Comment: 1 beer every 3 wks     Alcohol Use 1/17/2019   If you drink alcohol do  you typically have greater than 3 drinks per day OR greater than 7 drinks per week? Yes   AUDIT SCORE  6           Last PSA:   PSA   Date Value Ref Range Status   12/10/2018 0.48 0 - 4 ug/L Final     Comment:     Assay Method:  Chemiluminescence using Siemens Vista analyzer       Reviewed orders with patient. Reviewed health maintenance and updated orders accordingly - Yes  Labs reviewed in EPIC  BP Readings from Last 3 Encounters:   01/17/19 114/66   12/05/18 102/68   04/24/17 120/78    Wt Readings from Last 3 Encounters:   01/17/19 77.1 kg (170 lb)   12/05/18 75.3 kg (166 lb)   04/24/17 73.5 kg (162 lb)                  Patient Active Problem List   Diagnosis     Tobacco use disorder     Psychosexual dysfunction with inhibited sexual excitement     iamLUMBAR DISC DISPLACEMENT     iamPOSTSURGICAL STATES NEC     Obstructive sleep apnea     Neuropathic pain syndrome (non-herpetic)     CARDIOVASCULAR SCREENING; LDL GOAL LESS THAN 160     Foot drop, left     Lumbar radiculopathy     Chronic midline low back pain with left-sided sciatica     Personal history of nicotine dependence      Glaucoma suspect, bilateral     Past Surgical History:   Procedure Laterality Date     BACK SURGERY  11/2015    cleaned out bone spurs     C ECHO HEART XTHORACIC,COMPLETE, W/O DOPPLER  02/04/07    normal cardiac stress echo test     C ESTEBAN W/O FACETEC FORAMOT/[facility]KC 1/2 VRT SEG, LUMBAR  1/02    L4-5     C REPAIR LUMBAR HERNIA  1986    L5-4 L5-S1     HC COLONOSCOPY W BIOPSY  11/05/09     HC REMOVE TONSILS/ADENOIDS,<13 Y/O      T & A <11y.o.     LAMINECT/DISCECTOMY, LUMBAR  10/19/06    L3-4     LAMINECT/DISCECTOMY, LUMBAR  11/10    left sided L4-5     REMOVAL OF SPERM DUCT(S)      Vasectomy     ROTATOR CUFF REPAIR RT/LT  3/12    right sided       Social History     Tobacco Use     Smoking status: Current Every Day Smoker     Packs/day: 1.00     Years: 45.00     Pack years: 45.00     Types: Cigarettes     Smokeless tobacco: Never Used      Tobacco comment:  wants to quit   Substance Use Topics     Alcohol use: Yes     Alcohol/week: 3.6 oz     Types: 6 Cans of beer per week     Frequency: 4 or more times a week     Drinks per session: 5 or 6     Binge frequency: Daily or almost daily     Comment: 4-6 per day     Family History   Problem Relation Age of Onset     Arthritis Father      Depression Father      Heart Failure Father 80        CHF     Respiratory Mother         emphysema     Diabetes Mother      Other - See Comments Brother 21         in an MVA (oldest sibling     Other - See Comments Brother         older  all other siblings are 1/2      Other - See Comments Brother         older     Heart Disease Brother      Other - See Comments Brother         older     Other - See Comments Brother         older     Other - See Comments Brother         younger     Other - See Comments Brother 21        motorcycle injury, Brain death     Other - See Comments Brother         full sibling but given up for adoption 1 yr older     Other - See Comments Grandchild         3 grand daughters 1 grand son         Current Outpatient Medications   Medication Sig Dispense Refill     ALEVE 220 MG OR TABS 1 TABLET EVERY 12 HOURS AS NEEDED       oxyCODONE-aspirin (PERCODAN) 4.8355-325 MG per tablet Take 1 tablet by mouth every 4 hours as needed for pain 90 tablet 0     sildenafil (VIAGRA) 50 MG tablet Take 0.5-1 tablets (25-50 mg) by mouth daily as needed 30 tablet 6     tiZANidine (ZANAFLEX) 4 MG capsule 1 TABLET 3 TIMES DAILY 90 capsule 6     Allergies   Allergen Reactions     No Known Drug Allergies      Recent Labs   Lab Test 12/10/18  1237   LDL 57   HDL 74   TRIG 43   CR 0.85   GFRESTIMATED >90   GFRESTBLACK >90   POTASSIUM 4.5        Reviewed and updated as needed this visit by clinical staff  Tobacco  Allergies  Meds         Reviewed and updated as needed this visit by Provider        Past Medical History:   Diagnosis Date     Lumbago      Major depression  in complete remission (H) 8/30/2012     Severe Depression [296.33] 6/18/2009     Tobacco use disorder       Past Surgical History:   Procedure Laterality Date     BACK SURGERY  11/2015    cleaned out bone spurs     C ECHO HEART XTHORACIC,COMPLETE, W/O DOPPLER  02/04/07    normal cardiac stress echo test     C ESTEBAN W/O FACETEC FORAMOT/[facility]KC 1/2 VRT SEG, LUMBAR  1/02    L4-5     C REPAIR LUMBAR HERNIA  1986    L5-4 L5-S1     HC COLONOSCOPY W BIOPSY  11/05/09     HC REMOVE TONSILS/ADENOIDS,<13 Y/O      T & A <11y.o.     LAMINECT/DISCECTOMY, LUMBAR  10/19/06    L3-4     LAMINECT/DISCECTOMY, LUMBAR  11/10    left sided L4-5     REMOVAL OF SPERM DUCT(S)      Vasectomy     ROTATOR CUFF REPAIR RT/LT  3/12    right sided       Review of Systems   Constitutional: Negative for chills and fever.   HENT: Negative for congestion, ear pain, hearing loss and sore throat.    Eyes: Positive for visual disturbance. Negative for pain.   Respiratory: Negative for cough and shortness of breath.    Cardiovascular: Negative for chest pain, palpitations and peripheral edema.   Gastrointestinal: Negative for abdominal pain, constipation, diarrhea, heartburn, hematochezia and nausea.   Genitourinary: Positive for impotence and urgency. Negative for discharge, dysuria, frequency, genital sores and hematuria.   Musculoskeletal: Positive for arthralgias and myalgias. Negative for joint swelling.   Skin: Negative for rash.   Neurological: Positive for weakness. Negative for dizziness, headaches and paresthesias.   Psychiatric/Behavioral: Negative for mood changes. The patient is not nervous/anxious.      CONSTITUTIONAL: NEGATIVE for fever, chills, change in weight  INTEGUMENTARY/SKIN: NEGATIVE for worrisome rashes, moles or lesions  EYES: NEGATIVE for vision changes or irritation  ENT: NEGATIVE for ear, mouth and throat problems  RESP: NEGATIVE for significant cough or SOB  CV: NEGATIVE for chest pain, palpitations or peripheral edema  GI: NEGATIVE  "for nausea, abdominal pain, heartburn, or change in bowel habits   male: negative for dysuria, hematuria, decreased urinary stream, urethral discharge.  He does continue to have some erectile dysfunction and has good results with Viagra so is requesting a refill of this today.  MUSCULOSKELETAL: Complains of charley horses in his legs frequently and particularly along the medial aspect of the left upper thigh.  With certain movements he gets cramping pain in that area and is very localized to that mid to medial thigh region  NEURO: He continues to have foot drop on the left which is not new for him.  This is not worsened.  The only thing that he really is noticing now is that he gets significantly white coloration to his fingers and hands in the cold weather.  He noticed this prior to moving down to [place] and the symptoms essentially completely resolved when he was in the warm weather down there.  Now being back in [place] for the winter his hands are experiencing the same type of symptoms.  When his hands get cold they turn start white and then go through stages of red and purple coloration in the fingers and sometimes up onto the hands bilaterally.  Denies stiffness or problems with hand movements when it gets like that.  He does have a burning sensation in the hands though bilaterally.  PSYCHIATRIC: NEGATIVE for changes in mood or affect    OBJECTIVE:   /66   Pulse 62   Temp 97.7  F (36.5  C) (Temporal)   Resp 18   Ht 1.966 m (6' 5.4\")   Wt 77.1 kg (170 lb)   SpO2 97%   BMI 19.95 kg/m      Physical Exam  GENERAL: healthy, alert and no distress  EYES: Eyes grossly normal to inspection, PERRL and conjunctivae and sclerae normal  HENT: ear canals and TM's normal, nose and mouth without ulcers or lesions  NECK: no adenopathy, no asymmetry, masses, or scars and thyroid normal to palpation  RESP: lungs clear to auscultation - no rales, rhonchi or wheezes  CV: regular rate and rhythm, normal S1 S2, no S3 " or S4, no murmur, click or rub, no peripheral edema and peripheral pulses strong  ABDOMEN: soft, nontender, no hepatosplenomegaly, no masses and bowel sounds normal  MS: There is no obvious muscle deformities and has good muscle tone throughout.  He does have minimal tenderness along the gracilis muscle on the left I do not feel any not like structures or cord like structures there is no redness or warmth to that inner thigh.  I do not notice any skin changes in his hands bilaterally today is good good capillary refill and normal color.  SKIN: no suspicious lesions or rashes  NEURO: Normal strength and tone, mentation intact and speech normal  PSYCH: mentation appears normal, affect normal/bright    Diagnostic Test Results:  None, but we did review his labs from December.    ASSESSMENT/PLAN:   (Z00.00) Routine general medical examination at a health care facility  (primary encounter diagnosis)  Comment: Overall doing well he is been very stable with his chronic low back pain.  His biggest concern now is his hands and how they turn white in the cold and then turn red and purple.  He does not get any stiffness with this but his symptoms seem consistent with a Raynaud's phenomena.  Plan: We will try to get him referred to rheumatology to see if we can get this evaluated and a diagnosis made.    (M62.830) Back muscle spasm  (M54.40,  G89.29) Chronic midline low back pain with sciatica, sciatica laterality unspecified  (M54.16) Lumbar radiculopathy  Comment: Ongoing problem that he will occasionally use Percodan for pain.  He does have tizanidine spasm and has been in that at home his last refill of his Percodan was almost 2 years ago so this is not something that he  Plan: oxyCODONE-aspirin (PERCODAN) 4.8355-325 MG per         tablet        Refill was given.    (N52.8) Other male erectile dysfunction  Comment: He has been having some issues with erectile dysfunction for a few years now and gets good results with  "sildenafil.  Plan: sildenafil (VIAGRA) 50 MG tablet        Refills of this medication were given for him to refill.    (L81.9) Discoloration of skin of hand (bilateral) hands turn white then red and purple when cold with burning sensation  Comment: This situation sounds like a Raynaud's phenomenon which is a rheumatologic disorder.  Plan: I am and refer him to the rheumatologist for further evaluation and workup.    (Z23) Need for vaccination  Comment: Due to his smoking he is a candidate for the Pneumovax which she was agreeable to get today.  I also talked to him about shingles vaccine and will give that today also.  Plan: Pneumococcal vaccine 23 valent PPSV23          (Pneumovax) [57514], ADMIN: Vaccine, Initial         (05710), SHINGRIX [18611], Each additional         admin.  (Right click and add QUANTITY)  [27444]        Both were given         COUNSELING:   Reviewed preventive health counseling, as reflected in patient instructions       Regular exercise       Healthy diet/nutrition       Vision screening       Immunizations    Vaccinated for: Pneumococcal and Zoster             Alcohol Use    BP Readings from Last 1 Encounters:   01/17/19 114/66     Estimated body mass index is 19.95 kg/m  as calculated from the following:    Height as of this encounter: 1.966 m (6' 5.4\").    Weight as of this encounter: 77.1 kg (170 lb).           reports that he has been smoking cigarettes.  He has a 45.00 pack-year smoking history. he has never used smokeless tobacco.  Tobacco Cessation Action Plan: Information offered: Patient not interested at this time    Counseling Resources:  ATP IV Guidelines  Pooled Cohorts Equation Calculator  FRAX Risk Assessment  ICSI Preventive Guidelines  Dietary Guidelines for Americans, 2010  USDA's MyPlate  ASA Prophylaxis  Lung CA Screening    Francis Lane MD, MD  [facility]  "

## 2019-01-23 ENCOUNTER — TELEPHONE (OUTPATIENT)
Dept: FAMILY MEDICINE | Facility: CLINIC | Age: 59
End: 2019-01-23

## 2019-01-23 NOTE — TELEPHONE ENCOUNTER
Reason for Call: Request for an order or referral:    Order or referral being requested: Was seen last week and forgot to ask for a handicap sticker for his car. Please call and advise.     Date needed: as soon as possible    Has the patient been seen by the PCP for this problem? YES    Additional comments:     Phone number Patient can be reached at:  Home number on file [phone] (home)    Best Time:  any    Can we leave a detailed message on this number?  YES    Call taken on 1/23/2019 at 9:18 AM by Hannah Kirkpatrick

## 2019-01-23 NOTE — TELEPHONE ENCOUNTER
He is to go to the [employer] and ask for an application for handicap parking sticker.  He can then mail that her drop it off at the clinic and I will fill it out for him.    Electronically signed by:  Francis Lane M.D.  1/23/2019

## 2019-09-30 ENCOUNTER — TELEPHONE (OUTPATIENT)
Dept: FAMILY MEDICINE | Facility: CLINIC | Age: 59
End: 2019-09-30

## 2019-09-30 NOTE — TELEPHONE ENCOUNTER
Reason for Call:  Same Day Appointment, Requested Provider:  Francis Lane MD    PCP: Francis Lane    Reason for visit: very swollen and painful Rt Knee.  Tripped and fell on Friday due to his drop foot.      Duration of symptoms: Saturday morning    Have you been treated for this in the past? No    Additional comments: Would like to see PCP because he has been his Dr for many many years    Can we leave a detailed message on this number? YES    Phone number patient can be reached at: Home number on file [phone] (home)    Best Time: any    Call taken on 9/30/2019 at 8:51 AM by Betsy Simpson

## 2019-09-30 NOTE — TELEPHONE ENCOUNTER
Patient reports pain and swelling to the right knee after fall on Friday.  Patient reports tripping due to left foot drop, landing on the right knee.  Knee is now swollen, painful, bruised and warm to touch.  He is having difficulty ambulating due to pain, rating pain a 7/10.  He is using a OTC topical pain medication rub and ice.      He is requesting to only see PCP, he was informed that PCP may refer directly to ortho for eval.      Yulia Davies RN

## 2019-09-30 NOTE — TELEPHONE ENCOUNTER
He needs to be seen this week by someone.  YASMIN/MA    Patient has an appt on 10/1/2019 in [place].  AGNES

## 2019-09-30 NOTE — PROGRESS NOTES
Subjective     oGmez Gutierrez is a 59 year old male who presents to clinic today for the following health issues:    HPI   Right knee pain x 2 weeks  Patient fell on Friday and landed on his knee - swollen and pain  Using heat for pain  Pain 7/10    Patient presents today for evaluation of right knee pain. He reports some mild pain started about 2 weeks ago. Didn't think much of it as has an extensive back history and not uncommon to have radiation of pain down his legs. He reports on Friday he fell directly onto his knee. He states he struggles with a drop foot on the left side that caused him to fall. He reports on Saturday morning he noticed redness and bruising over the right anterior knee. Pain OK at rest but worse with movement. Stiff upon standing. Has been using heat which helps with the pain. Denies any calf tenderness. Denies instability. No history of right knee issues.     Patient Active Problem List   Diagnosis     Tobacco use disorder     Psychosexual dysfunction with inhibited sexual excitement     Other male erectile dysfunction     iamLUMBAR DISC DISPLACEMENT     iamPOSTSURGICAL STATES NEC     Obstructive sleep apnea     Neuropathic pain syndrome (non-herpetic)     CARDIOVASCULAR SCREENING; LDL GOAL LESS THAN 160     Foot drop, left     Lumbar radiculopathy     Chronic midline low back pain with sciatica, sciatica laterality unspecified     Personal history of nicotine dependence      Glaucoma suspect, bilateral     Discoloration of skin of hand (bilateral) hands turn white then red and purple when cold with burning sensation     Back muscle spasm     Past Surgical History:   Procedure Laterality Date     BACK SURGERY  11/2015    cleaned out bone spurs     C ECHO HEART XTHORACIC,COMPLETE, W/O DOPPLER  02/04/07    normal cardiac stress echo test     C ESTEBAN W/O FACETEC FORAMOT/[facility] 1/2 VRT SEG, LUMBAR  1/02    L4-5     C REPAIR LUMBAR HERNIA  1986    L5-4 L5-S1     HC COLONOSCOPY W BIOPSY  11/05/09      HC REMOVE TONSILS/ADENOIDS,<10 Y/O      T & A <11y.o.     LAMINECT/DISCECTOMY, LUMBAR  10/19/06    L3-4     LAMINECT/DISCECTOMY, LUMBAR  11/10    left sided L4-5     REMOVAL OF SPERM DUCT(S)      Vasectomy     ROTATOR CUFF REPAIR RT/LT  3/12    right sided       Social History     Tobacco Use     Smoking status: Current Every Day Smoker     Packs/day: 1.00     Years: 45.00     Pack years: 45.00     Types: Cigarettes     Smokeless tobacco: Never Used     Tobacco comment:  wants to quit   Substance Use Topics     Alcohol use: Yes     Alcohol/week: 6.0 standard drinks     Types: 6 Cans of beer per week     Frequency: 4 or more times a week     Drinks per session: 5 or 6     Binge frequency: Daily or almost daily     Comment: 4-6 per day     Family History   Problem Relation Age of Onset     Arthritis Father      Depression Father      Heart Failure Father 80        CHF     Respiratory Mother         emphysema     Diabetes Mother      Other - See Comments Brother 21         in an MVA (oldest sibling     Other - See Comments Brother         older  all other siblings are 1/2      Other - See Comments Brother         older     Heart Disease Brother      Other - See Comments Brother         older     Other - See Comments Brother         older     Other - See Comments Brother         younger     Other - See Comments Brother 21        motorcycle injury, Brain death     Other - See Comments Brother         full sibling but given up for adoption 1 yr older     Other - See Comments Grandchild         3 grand daughters 1 grand son         No current outpatient medications on file.     Allergies   Allergen Reactions     No Known Drug Allergies      BP Readings from Last 3 Encounters:   10/01/19 106/62   19 114/66   18 102/68    Wt Readings from Last 3 Encounters:   10/01/19 76.7 kg (169 lb)   19 77.1 kg (170 lb)   18 75.3 kg (166 lb)        Reviewed and updated as needed this visit by Provider      "    Review of Systems   ROS COMP: Constitutional, HEENT, cardiovascular, pulmonary, gi and gu systems are negative, except as otherwise noted.      Objective    /62   Pulse 80   Temp 99.1  F (37.3  C) (Temporal)   Resp 16   Ht 1.981 m (6' 6\")   Wt 76.7 kg (169 lb)   SpO2 99%   BMI 19.53 kg/m    Body mass index is 19.53 kg/m .  Physical Exam   GENERAL: healthy, alert and no distress  MS: ecchymosis and edema present over right anterior and medial aspect of knee. Tenderness with palpation over patella. Full ROM of knee. No calf tenderness. Distal pulses intact.   SKIN: no suspicious lesions or rashes  PSYCH: mentation appears normal, affect normal/bright    Diagnostic Test Results:  Labs reviewed in Epic  Xray - No acute fracture present        Assessment & Plan     1. Acute pain of right knee  Reviewed xray results with patient. Discussed consistent with a contusion at this time. Encouraged rest, ice and elevation. Close monitoring of symptoms over the next 1-2 weeks. Patient will follow-up in clinic if new symptoms develop or current symptoms fail to improve.  - XR Knee Right 1/2 Views; Future     The patient indicates understanding of these issues and agrees with the plan.    Shirin Richey PA-C  [facility]    "

## 2019-10-01 ENCOUNTER — ANCILLARY PROCEDURE (OUTPATIENT)
Dept: GENERAL RADIOLOGY | Facility: OTHER | Age: 59
End: 2019-10-01
Attending: PHYSICIAN ASSISTANT
Payer: MEDICARE

## 2019-10-01 ENCOUNTER — OFFICE VISIT (OUTPATIENT)
Dept: FAMILY MEDICINE | Facility: OTHER | Age: 59
End: 2019-10-01
Payer: MEDICARE

## 2019-10-01 VITALS
WEIGHT: 169 LBS | HEIGHT: 78 IN | HEART RATE: 80 BPM | RESPIRATION RATE: 16 BRPM | OXYGEN SATURATION: 99 % | SYSTOLIC BLOOD PRESSURE: 106 MMHG | BODY MASS INDEX: 19.55 KG/M2 | DIASTOLIC BLOOD PRESSURE: 62 MMHG | TEMPERATURE: 99.1 F

## 2019-10-01 DIAGNOSIS — M25.561 ACUTE PAIN OF RIGHT KNEE: ICD-10-CM

## 2019-10-01 DIAGNOSIS — M25.561 ACUTE PAIN OF RIGHT KNEE: Primary | ICD-10-CM

## 2019-10-01 PROCEDURE — 99213 OFFICE O/P EST LOW 20 MIN: CPT | Performed by: PHYSICIAN ASSISTANT

## 2019-10-01 PROCEDURE — 73560 X-RAY EXAM OF KNEE 1 OR 2: CPT | Mod: RT

## 2019-10-01 ASSESSMENT — MIFFLIN-ST. JEOR: SCORE: 1709.83

## 2019-10-01 NOTE — PATIENT INSTRUCTIONS
No concerning findings of anything broken today   - I will call you if radiology finds anything different    Likely just a really bad bruise    Rest, ice and elevate    Please let me know if things are not slowly improving over the next 2 weeks

## 2019-12-10 ENCOUNTER — TELEPHONE (OUTPATIENT)
Dept: FAMILY MEDICINE | Facility: CLINIC | Age: 59
End: 2019-12-10

## 2019-12-10 DIAGNOSIS — M25.562 ACUTE PAIN OF LEFT KNEE: ICD-10-CM

## 2019-12-10 DIAGNOSIS — M25.512 ACUTE PAIN OF LEFT SHOULDER: Primary | ICD-10-CM

## 2019-12-10 NOTE — TELEPHONE ENCOUNTER
Called patient and talked to him about his foot pain and how trips and then hurts his left shoulder as well.    We talked about him seeing the sports medicine provider and he thinks that would be a good idea to see someone.    Per .  Please approve order.

## 2019-12-10 NOTE — TELEPHONE ENCOUNTER
I called pt and wife back.  Gomez prefers to see Dr. Lane since he knows him so well, not urgent.  Please let him know when you would be able to see patient and we can give them a call back.  Kath Mckinney, CMA

## 2019-12-10 NOTE — TELEPHONE ENCOUNTER
Reason for Call:  Work in Appointment, Requested Provider:  Francis Lane MD    PCP: Francis Lane    Reason for visit: Left shoulder and right knee pain from falling 10.1.19. Please advise if pt can be worked in.    Duration of symptoms: 10.1.19    Have you been treated for this in the past? Yes    Additional comments:     Can we leave a detailed message on this number? YES    Phone number patient can be reached at: Home number on file [phone] (home)    Best Time:     Call taken on 12/10/2019 at 12:17 PM by Joy Albright

## 2019-12-10 NOTE — TELEPHONE ENCOUNTER
Are you able to work patient in or would you like us to see if we can schedule him with someone else?  Kath Mckinney, CMA

## 2019-12-12 NOTE — PROGRESS NOTES
Sports Medicine Clinic Visit    PCP: Francis Lane    CC: Patient presents with:  Left Shoulder - Pain  Right Shoulder - Pain      HPI:  Gomez Gutierrez is a 59 year old male who is seen in consultation at the request of Dr. Lane.   He notes left knee and shoulder pain. He has had 7 back surgeries in the past and has a foot drop. He often falls due the foot drop and this has caused many joint pains. He had rotator cuff repair 5 or 6 years ago in the right shoulder. Today he notes bilateral shoulder pain. He rates the pain at a 11/10 at its worst and a 7/10 currently.  Symptoms are relieved with topical analgesics, prednisone (10 mg very rarely), tizanidine, oxycodone (very rarely). Symptoms are worsened by lifting, overhead motions activity, and overuse. He endorses popping, grinding, tingling (intermittently) and weakness.   He denies instability.  Other treatment has included surgery (rotator cuff tear 5 or 6 years ago) and physical therapy after surgery.       Review of Systems:  Musculoskeletal: as above  Remainder of review of systems is negative including constitutional, eyes, ENT, CV, pulmonary, GI, , endocrine, skin, hematologic, and neurologic except as noted in HPI or medical history.    History reviewed. No pertinent past surgical/medical/family/social history other than as mentioned in HPI.    Patient Active Problem List   Diagnosis     Tobacco use disorder     Psychosexual dysfunction with inhibited sexual excitement     Other male erectile dysfunction     iamLUMBAR DISC DISPLACEMENT     iamPOSTSURGICAL STATES NEC     Obstructive sleep apnea     Neuropathic pain syndrome (non-herpetic)     CARDIOVASCULAR SCREENING; LDL GOAL LESS THAN 160     Foot drop, left     Lumbar radiculopathy     Chronic midline low back pain with sciatica, sciatica laterality unspecified     Personal history of nicotine dependence      Glaucoma suspect, bilateral     Discoloration of skin of hand (bilateral) hands turn  white then red and purple when cold with burning sensation     Back muscle spasm     Past Medical History:   Diagnosis Date     Lumbago      Major depression in complete remission (H) 2012     Severe Depression [296.33] 2009     Tobacco use disorder      Past Surgical History:   Procedure Laterality Date     BACK SURGERY  2015    cleaned out bone spurs     C ECHO HEART XTHORACIC,COMPLETE, W/O DOPPLER  07    normal cardiac stress echo test     C ESTEBAN W/O FACETEC FORAMOT/[facility]KC  VRT SEG, LUMBAR      L4-5     C REPAIR LUMBAR HERNIA      L5-4 L5-S1     HC COLONOSCOPY W BIOPSY  09     HC REMOVE TONSILS/ADENOIDS,<11 Y/O      T & A <11y.o.     LAMINECT/DISCECTOMY, LUMBAR  10/19/06    L3-4     LAMINECT/DISCECTOMY, LUMBAR  11/10    left sided L4-5     REMOVAL OF SPERM DUCT(S)      Vasectomy     ROTATOR CUFF REPAIR RT/LT  3/12    right sided     Family History   Problem Relation Age of Onset     Arthritis Father      Depression Father      Heart Failure Father 80        CHF     Respiratory Mother         emphysema     Diabetes Mother      Other - See Comments Brother 21         in an MVA (oldest sibling     Other - See Comments Brother         older  all other siblings are 1/2      Other - See Comments Brother         older     Heart Disease Brother      Other - See Comments Brother         older     Other - See Comments Brother         older     Other - See Comments Brother         younger     Other - See Comments Brother 21        motorcycle injury, Brain death     Other - See Comments Brother         full sibling but given up for adoption 1 yr older     Other - See Comments Grandchild         3 grand daughters 1 grand son     Social History     Socioeconomic History     Marital status:      Spouse name: Jana     Number of children: 2     Years of education: 14     Highest education level: Not on file   Occupational History     Employer: DISABLED     Comment: not working     Social Needs     Financial resource strain: Not very hard     Food insecurity:     Worry: Never true     Inability: Never true     Transportation needs:     Medical: No     Non-medical: No   Tobacco Use     Smoking status: Current Every Day Smoker     Packs/day: 1.00     Years: 45.00     Pack years: 45.00     Types: Cigarettes     Smokeless tobacco: Never Used     Tobacco comment:  wants to quit   Substance and Sexual Activity     Alcohol use: Yes     Alcohol/week: 6.0 standard drinks     Types: 6 Cans of beer per week     Frequency: 4 or more times a week     Drinks per session: 5 or 6     Binge frequency: Daily or almost daily     Comment: 4-6 per day     Drug use: Yes     Types: Marijuana     Sexual activity: Yes     Partners: Female   Lifestyle     Physical activity:     Days per week: 0 days     Minutes per session: 0 min     Stress: Not at all   Relationships     Social connections:     Talks on phone: Once a week     Gets together: Once a week     Attends Nondenominational service: Never     Active member of club or organization: No     Attends meetings of clubs or organizations: Never     Relationship status:      Intimate partner violence:     Fear of current or ex partner: No     Emotionally abused: No     Physically abused: No     Forced sexual activity: No   Other Topics Concern      Service No     Blood Transfusions No     Caffeine Concern Yes     Occupational Exposure No     Hobby Hazards No     Sleep Concern Yes     Comment: not always well     Stress Concern No     Comment: markedly improved     Weight Concern No     Special Diet No     Back Care No     Comment: improved since surgery in 10/06     Exercise Yes     Comment: daily for 20 minutes at a time, doing some golfing and that is going well.     Bike Helmet Not Asked     Seat Belt Yes     Self-Exams Yes     Parent/sibling w/ CABG, MI or angioplasty before 65F 55M? Not Asked   Social History Narrative     Not on file       He is retired.  "    No current outpatient medications on file.     No current facility-administered medications for this visit.      Allergies   Allergen Reactions     No Known Drug Allergies          Objective:  /76   Ht 1.981 m (6' 6\")   Wt 75.5 kg (166 lb 8 oz)   BMI 19.24 kg/m      General: Alert and in no distress    Head: Normocephalic, atraumatic  Eyes: no scleral icterus or conjunctival erythema   Oropharynx:  Mucous membranes moist  Skin: no erythema, petechiae, or jaundice  CV: regular rhythm by palpation, 2+ distal pulses  Resp: normal respiratory effort without conversational dyspnea   Psych: normal mood and affect    Gait: Left foot drop, lifting leg up to clear foot with ambulation  Neuro: Motor strength and sensation as noted below    Musculoskeletal:    Bilateral Shoulder exam    Inspection and Posture:       normal    Skin:        no visible deformities    Palpation:  Tender over the bilateral proximal biceps tendon insertions    ROM:        Right shoulder internal rotation is decreased compared to the left.    Painful motions:  -Bilateral shoulder abduction, flexion, and internal rotation behind the back are painful.  Left shoulder external rotation is painful.  Bilateral shoulder adduction is mildly painful.    Strength:        shoulder shrug 5/5 bilaterally       shoulder abduction 5/5 bilaterally       shoulder flexion 5/5 bilaterally       shoulder internal rotation 5/5 bilaterally       shoulder external rotation 5/5 bilaterally       elbow flexion 5/5 bilaterally       elbow extension 5/5 bilaterally       forearm pronation 5/5 bilaterally       forearm supination 5/5 bilaterally       wrist flexion 5/5 bilaterally       wrist extension 5/5 bilaterally        strength 5/5 bilaterally       finger abduction 5/5 bilaterally    Sensation:        normal sensation over shoulder and upper extremity       Radiology:  X-rays ordered and independent visualization of images performed and reviewed with " Gomez.    Recent Results (from the past 744 hour(s))   XR Shoulder Bilateral G/E 2 Views    Narrative    SHOULDER BILATERAL TWO OR MORE VIEWS   12/13/2019 12:04 PM     HISTORY: Bilateral shoulder pain.    COMPARISON: None.      Impression    IMPRESSION:   Right: Mild-to-moderate acromioclavicular degenerative changes. Mild  glenohumeral degenerative changes with early inferior marginal  osteophyte formation. No evidence of fracture or subluxation. There  are some cystic resorptive changes in the greater tuberosity.    Left: Mild acromioclavicular degenerative changes. Glenohumeral joint  is unremarkable. No fracture or subluxation.    PIETRO SIDDIQI MD          Assessment:  1. Chronic pain of both shoulders    2. Tendinopathy of left rotator cuff    3. Tendinopathy of left biceps tendon        Plan:  Discussed the assessment with the patient and developed a plan together:  -Shoulder pain likely due to tendinopathy rather than rotator cuff tear given retained range of motion and strength.  Mild degenerative changes could also be contributing.  Pain is not too bad not so would hold off on steroid injection and instead begin with physical therapy.  He lives in [place] so we provided a printed referral and a couple of options as below.  -Rehab: Physical Therapy.  Please do 5-6 days of exercises per week between formal sessions and the home exercises they provide.    [facility]  [address]  [place] [address]  Phone: [phone]    [facility]  [address] [place] [address] [address]  [place] [address]  Phone: [phone]    -Ice or heat 15-20 minutes as needed (Avoid sleeping on a heating pad or ice)  -Patient's preferred over the counter medication as directed on packaging as needed for pain or soreness.    -We also discussed other future treatment options:  Steroid injections    Follow Up: As needed if symptoms fail to improve or worsen. Please call with any questions or  concerns.    Meghan García MD, CAQ Sports Medicine  [facility]

## 2019-12-13 ENCOUNTER — ANCILLARY PROCEDURE (OUTPATIENT)
Dept: GENERAL RADIOLOGY | Facility: CLINIC | Age: 59
End: 2019-12-13
Attending: PHYSICAL MEDICINE & REHABILITATION
Payer: MEDICARE

## 2019-12-13 ENCOUNTER — OFFICE VISIT (OUTPATIENT)
Dept: ORTHOPEDICS | Facility: CLINIC | Age: 59
End: 2019-12-13
Payer: MEDICARE

## 2019-12-13 VITALS
HEIGHT: 78 IN | DIASTOLIC BLOOD PRESSURE: 76 MMHG | BODY MASS INDEX: 19.26 KG/M2 | WEIGHT: 166.5 LBS | SYSTOLIC BLOOD PRESSURE: 110 MMHG

## 2019-12-13 DIAGNOSIS — M67.912 TENDINOPATHY OF LEFT ROTATOR CUFF: ICD-10-CM

## 2019-12-13 DIAGNOSIS — M25.511 BILATERAL SHOULDER PAIN: ICD-10-CM

## 2019-12-13 DIAGNOSIS — M67.922 TENDINOPATHY OF LEFT BICEPS TENDON: ICD-10-CM

## 2019-12-13 DIAGNOSIS — M25.512 BILATERAL SHOULDER PAIN: ICD-10-CM

## 2019-12-13 DIAGNOSIS — G89.29 CHRONIC PAIN OF BOTH SHOULDERS: Primary | ICD-10-CM

## 2019-12-13 DIAGNOSIS — M25.511 CHRONIC PAIN OF BOTH SHOULDERS: Primary | ICD-10-CM

## 2019-12-13 DIAGNOSIS — M25.512 CHRONIC PAIN OF BOTH SHOULDERS: Primary | ICD-10-CM

## 2019-12-13 PROCEDURE — 73030 X-RAY EXAM OF SHOULDER: CPT | Mod: TC

## 2019-12-13 PROCEDURE — 99204 OFFICE O/P NEW MOD 45 MIN: CPT | Performed by: PHYSICAL MEDICINE & REHABILITATION

## 2019-12-13 ASSESSMENT — MIFFLIN-ST. JEOR: SCORE: 1698.49

## 2019-12-13 NOTE — PATIENT INSTRUCTIONS
Today's Plan of Care:  -Rehab: Physical Therapy    [facility]  [address]  [place] [address]  Phone: [phone]    [facility]  [address] [facility] [address]  [place] [address]  Phone: [phone]    -Ice or heat 15-20 minutes as needed (Avoid sleeping on a heating pad or ice)  -Patient's preferred over the counter medication as directed on packaging as needed for pain or soreness.    -We also discussed other future treatment options:  Steroid injections    Follow Up: As needed if symptoms fail to improve or worsen. Please call with any questions or concerns.

## 2019-12-13 NOTE — LETTER
12/13/2019         RE: Gomez Gutierrez  [address]  [facility] [address]        Dear Colleague,    Thank you for referring your patient, Gomez Gutierrez, to the [facility] [place]. Please see a copy of my visit note below.    Sports Medicine Clinic Visit    PCP: Francis Lane    CC: Patient presents with:  Left Shoulder - Pain  Right Shoulder - Pain      HPI:  Gomez Gutierrez is a 59 year old male who is seen in consultation at the request of Dr. Lane.   He notes left knee and shoulder pain. He has had 7 back surgeries in the past and has a foot drop. He often falls due the foot drop and this has caused many joint pains. He had rotator cuff repair 5 or 6 years ago in the right shoulder. Today he notes bilateral shoulder pain. He rates the pain at a 11/10 at its worst and a 7/10 currently.  Symptoms are relieved with topical analgesics, prednisone (10mg very rarely), tizanidine, oxycodone (very rarely). Symptoms are worsened by lifting, overhead motions activity, and overuse. He endorses popping, grinding, tingling (intermittently) and weakness.   He denies instability.  Other treatment has included surgery (rotator cuff tear 5 or 6 years ago) and physical therapy after surgery.       Review of Systems:  Musculoskeletal: as above  Remainder of review of systems is negative including constitutional, eyes, ENT, CV, pulmonary, GI, , endocrine, skin, hematologic, and neurologic except as noted in HPI or medical history.    History reviewed. No pertinent past surgical/medical/family/social history other than as mentioned in HPI.    Patient Active Problem List   Diagnosis     Tobacco use disorder     Psychosexual dysfunction with inhibited sexual excitement     Other male erectile dysfunction     iamLUMBAR DISC DISPLACEMENT     iamPOSTSURGICAL STATES NEC     Obstructive sleep apnea     Neuropathic pain syndrome (non-herpetic)     CARDIOVASCULAR SCREENING; LDL GOAL LESS THAN 160     Foot drop, left      Lumbar radiculopathy     Chronic midline low back pain with sciatica, sciatica laterality unspecified     Personal history of nicotine dependence      Glaucoma suspect, bilateral     Discoloration of skin of hand (bilateral) hands turn white then red and purple when cold with burning sensation     Back muscle spasm     Past Medical History:   Diagnosis Date     Lumbago      Major depression in complete remission (H) 2012     Severe Depression [296.33] 2009     Tobacco use disorder      Past Surgical History:   Procedure Laterality Date     BACK SURGERY  2015    cleaned out bone spurs     C ECHO HEART XTHORACIC,COMPLETE, W/O DOPPLER  07    normal cardiac stress echo test     C ESTEBAN W/O FACETEC FORAMOT/[facility]KC  VRT SEG, LUMBAR      L4-5     C REPAIR LUMBAR HERNIA      L5-4 L5-S1     HC COLONOSCOPY W BIOPSY  09     HC REMOVE TONSILS/ADENOIDS,<12 Y/O      T & A <11y.o.     LAMINECT/DISCECTOMY, LUMBAR  10/19/06    L3-4     LAMINECT/DISCECTOMY, LUMBAR  11/10    left sided L4-5     REMOVAL OF SPERM DUCT(S)      Vasectomy     ROTATOR CUFF REPAIR RT/LT  3/12    right sided     Family History   Problem Relation Age of Onset     Arthritis Father      Depression Father      Heart Failure Father 80        CHF     Respiratory Mother         emphysema     Diabetes Mother      Other - See Comments Brother 21         in an MVA (oldest sibling     Other - See Comments Brother         older  all other siblings are 1/2      Other - See Comments Brother         older     Heart Disease Brother      Other - See Comments Brother         older     Other - See Comments Brother         older     Other - See Comments Brother         younger     Other - See Comments Brother 21        motorcycle injury, Brain death     Other - See Comments Brother         full sibling but given up for adoption 1 yr older     Other - See Comments Grandchild         3 grand daughters 1 grand son     Social History      Socioeconomic History     Marital status:      Spouse name: Jana     Number of children: 2     Years of education: 14     Highest education level: Not on file   Occupational History     Employer: DISABLED     Comment: not working    Social Needs     Financial resource strain: Not very hard     Food insecurity:     Worry: Never true     Inability: Never true     Transportation needs:     Medical: No     Non-medical: No   Tobacco Use     Smoking status: Current Every Day Smoker     Packs/day: 1.00     Years: 45.00     Pack years: 45.00     Types: Cigarettes     Smokeless tobacco: Never Used     Tobacco comment:  wants to quit   Substance and Sexual Activity     Alcohol use: Yes     Alcohol/week: 6.0 standard drinks     Types: 6 Cans of beer per week     Frequency: 4 or more times a week     Drinks per session: 5 or 6     Binge frequency: Daily or almost daily     Comment: 4-6 per day     Drug use: Yes     Types: Marijuana     Sexual activity: Yes     Partners: Female   Lifestyle     Physical activity:     Days per week: 0 days     Minutes per session: 0 min     Stress: Not at all   Relationships     Social connections:     Talks on phone: Once a week     Gets together: Once a week     Attends Rastafari service: Never     Active member of club or organization: No     Attends meetings of clubs or organizations: Never     Relationship status:      Intimate partner violence:     Fear of current or ex partner: No     Emotionally abused: No     Physically abused: No     Forced sexual activity: No   Other Topics Concern      Service No     Blood Transfusions No     Caffeine Concern Yes     Occupational Exposure No     Hobby Hazards No     Sleep Concern Yes     Comment: not always well     Stress Concern No     Comment: markedly improved     Weight Concern No     Special Diet No     Back Care No     Comment: improved since surgery in 10/06     Exercise Yes     Comment: daily for 20 minutes at a  "time, doing some golfing and that is going well.     Bike Helmet Not Asked     Seat Belt Yes     Self-Exams Yes     Parent/sibling w/ CABG, MI or angioplasty before 65F 55M? Not Asked   Social History Narrative     Not on file       He is retired.     No current outpatient medications on file.     No current facility-administered medications for this visit.      Allergies   Allergen Reactions     No Known Drug Allergies          Objective:  /76   Ht 1.981 m (6' 6\")   Wt 75.5 kg (166 lb 8 oz)   BMI 19.24 kg/m       General: Alert and in no distress    Head: Normocephalic, atraumatic  Eyes: no scleral icterus or conjunctival erythema   Oropharynx:  Mucous membranes moist  Skin: no erythema, petechiae, or jaundice  CV: regular rhythm by palpation, 2+ distal pulses  Resp: normal respiratory effort without conversational dyspnea   Psych: normal mood and affect    Gait: Left foot drop, lifting leg up to clear foot with ambulation  Neuro: Motor strength and sensation as noted below    Musculoskeletal:    Bilateral Shoulder exam    Inspection and Posture:       normal    Skin:        no visible deformities    Palpation:  Tender over the bilateral proximal biceps tendon insertions    ROM:        Right shoulder internal rotation is decreased compared to the left.    Painful motions:  -Bilateral shoulder abduction, flexion, and internal rotation behind the back are painful.  Left shoulder external rotation is painful.  Bilateral shoulder adduction is mildly painful.    Strength:        shoulder shrug 5/5 bilaterally       shoulder abduction 5/5 bilaterally       shoulder flexion 5/5 bilaterally       shoulder internal rotation 5/5 bilaterally       shoulder external rotation 5/5 bilaterally       elbow flexion 5/5 bilaterally       elbow extension 5/5 bilaterally       forearm pronation 5/5 bilaterally       forearm supination 5/5 bilaterally       wrist flexion 5/5 bilaterally       wrist extension 5/5 bilaterally    "     strength 5/5 bilaterally       finger abduction 5/5 bilaterally    Sensation:        normal sensation over shoulder and upper extremity       Radiology:  X-rays ordered and independent visualization of images performed and reviewed with Gomez.  No significant degenerative changes seen.  Full radiology report to follow.        Assessment:  1. Chronic pain of both shoulders    2. Tendinopathy of left rotator cuff    3. Tendinopathy of left biceps tendon        Plan:  Discussed the assessment with the patient and developed a plan together:  -Shoulder pain likely due to tendinopathy rather than rotator cuff tear given retained range of motion and strength.  Pain is not too bad not so would hold off on steroid injection and instead begin with physical therapy.  He lives in [place] so we provided a printed referral and a couple of options as below.  -Rehab: Physical Therapy.  Please do 5-6 days of exercises per week between formal sessions and the home exercises they provide.    [facility]  [facility]  [place] [address]  Phone: [phone]    [facility]  [address] [facility] [address]  [place] [address]  Phone: [phone]    -Ice or heat 15-20 minutes as needed (Avoid sleeping on a heating pad or ice)  -Patient's preferred over the counter medication as directed on packaging as needed for pain or soreness.    -We also discussed other future treatment options:  Steroid injections    Follow Up: As needed if symptoms fail to improve or worsen. Please call with any questions or concerns.    Meghan García MD, [facility] Sports Medicine  [facility]    Again, thank you for allowing me to participate in the care of your patient.        Sincerely,        Lacie García MD

## 2019-12-16 NOTE — TELEPHONE ENCOUNTER
Pt's wife called and states they were not happy with the appt they had with sports medicine, pt wants to see Dr Lane even if it's early January. Please advise.

## 2019-12-16 NOTE — TELEPHONE ENCOUNTER
Spoke with patient, placed on the schedule at 340pm with Dr. Lane on jan 16th.    Anastasiya ARREAGA

## 2019-12-27 ENCOUNTER — TRANSFERRED RECORDS (OUTPATIENT)
Dept: HEALTH INFORMATION MANAGEMENT | Facility: CLINIC | Age: 59
End: 2019-12-27

## 2020-01-16 ENCOUNTER — OFFICE VISIT (OUTPATIENT)
Dept: FAMILY MEDICINE | Facility: CLINIC | Age: 60
End: 2020-01-16
Payer: MEDICARE

## 2020-01-16 VITALS
OXYGEN SATURATION: 94 % | BODY MASS INDEX: 20.13 KG/M2 | HEART RATE: 84 BPM | WEIGHT: 174 LBS | SYSTOLIC BLOOD PRESSURE: 122 MMHG | TEMPERATURE: 97.8 F | HEIGHT: 78 IN | DIASTOLIC BLOOD PRESSURE: 78 MMHG | RESPIRATION RATE: 18 BRPM

## 2020-01-16 DIAGNOSIS — G89.29 CHRONIC MIDLINE LOW BACK PAIN WITH SCIATICA, SCIATICA LATERALITY UNSPECIFIED: ICD-10-CM

## 2020-01-16 DIAGNOSIS — Z87.891 PERSONAL HISTORY OF NICOTINE DEPENDENCE: ICD-10-CM

## 2020-01-16 DIAGNOSIS — M25.561 RIGHT KNEE PAIN, UNSPECIFIED CHRONICITY: ICD-10-CM

## 2020-01-16 DIAGNOSIS — Z12.11 SPECIAL SCREENING FOR MALIGNANT NEOPLASMS, COLON: ICD-10-CM

## 2020-01-16 DIAGNOSIS — M54.40 CHRONIC MIDLINE LOW BACK PAIN WITH SCIATICA, SCIATICA LATERALITY UNSPECIFIED: ICD-10-CM

## 2020-01-16 DIAGNOSIS — M79.2 NEUROPATHIC PAIN SYNDROME (NON-HERPETIC): ICD-10-CM

## 2020-01-16 DIAGNOSIS — F17.200 TOBACCO USE DISORDER: ICD-10-CM

## 2020-01-16 DIAGNOSIS — M25.512 LEFT SHOULDER PAIN, UNSPECIFIED CHRONICITY: ICD-10-CM

## 2020-01-16 PROCEDURE — 99214 OFFICE O/P EST MOD 30 MIN: CPT | Performed by: FAMILY MEDICINE

## 2020-01-16 RX ORDER — KETOROLAC TROMETHAMINE 10 MG/1
10 TABLET, FILM COATED ORAL EVERY 6 HOURS PRN
Qty: 20 TABLET | Refills: 3 | Status: SHIPPED | OUTPATIENT
Start: 2020-01-16 | End: 2021-05-17

## 2020-01-16 RX ORDER — OXYCODONE AND ACETAMINOPHEN 5; 325 MG/1; MG/1
1 TABLET ORAL EVERY 6 HOURS PRN
Qty: 28 TABLET | Refills: 0 | Status: SHIPPED | OUTPATIENT
Start: 2020-01-16 | End: 2020-01-20 | Stop reason: ALTCHOICE

## 2020-01-16 ASSESSMENT — PAIN SCALES - GENERAL: PAINLEVEL: NO PAIN (0)

## 2020-01-16 ASSESSMENT — MIFFLIN-ST. JEOR: SCORE: 1730.92

## 2020-01-16 NOTE — PROGRESS NOTES
Subjective     Gomez Gutierrez is a 59 year old male who presents to clinic today for the following health issues:    HPI   Chief Complaint   Patient presents with     Shoulder Pain     left      Knee Pain     right knee pain     Forms       PROBLEMS TO ADD ON...  Patient has been having problems with his right knee and his left shoulder.  He was seen in October for his knee issue and had x-rays but the pain has not resolved.  He had slipped and landed on his knee and buttock.  He had swelling and pain at the time that really has not completely resolved.  The swelling is significantly better but he continues have pain particular with flexion and twisting of the knee.  He did have x-rays that actually looked a little abnormal with some degenerative changes but no acute fractures.  The radiologist thought that he probably did have a meniscal injury even from the x-ray which I am not sure how they can tell that.  He has not ever had any MRIs of that knee.  Regarding his shoulder pain.  He had a rotator cuff repair of the right number of years ago which was a rough recovery for him.  He feels that when he fell and injured his knee that he [place] his shoulder during the fall and he went to catch himself.  The left shoulder is his biggest issue right now.  He does have issues with reaching above shoulder height or trying to reach behind him.  Even trying to shovel or chop ice at his home is difficult because the pain in the shoulder.  He gets some clicking but no catching.  He has some x-rays with Dr. García from sports medicine which showed some mild acromioclavicular degenerative changes but the joint space itself looked looks unremarkable.  There is no signs of fracture or subluxation.    I have known this patient for 2 decades and he is not a drug seeker or abuse of the medication in any way.  I have given him 20-30 Percocet that can last him an entire year.  He also has gotten Toradol injections for back spasms and  low back pain that happens after too much movement or activity.  He was wondering if he could try some oral Toradol so that he can avoid going to the ED to get an injection.    He is also due for some follow-up.  He is due for colonoscopy and for a repeat CT of his chest for lung cancer screening.  We will get those set up for him today.    Patient Active Problem List   Diagnosis     Tobacco use disorder     Psychosexual dysfunction with inhibited sexual excitement     Other male erectile dysfunction     iamLUMBAR DISC DISPLACEMENT     iamPOSTSURGICAL STATES NEC     Obstructive sleep apnea     Neuropathic pain syndrome (non-herpetic)     CARDIOVASCULAR SCREENING; LDL GOAL LESS THAN 160     Foot drop, left     Lumbar radiculopathy     Chronic midline low back pain with sciatica, sciatica laterality unspecified     Personal history of nicotine dependence      Glaucoma suspect, bilateral     Discoloration of skin of hand (bilateral) hands turn white then red and purple when cold with burning sensation     Back muscle spasm     Past Surgical History:   Procedure Laterality Date     BACK SURGERY  11/2015    cleaned out bone spurs     C ECHO HEART XTHORACIC,COMPLETE, W/O DOPPLER  02/04/07    normal cardiac stress echo test     C ESTEBAN W/O FACETEC FORAMOT/[facility] 1/2 VRT SEG, LUMBAR  1/02    L4-5     C REPAIR LUMBAR HERNIA  1986    L5-4 L5-S1     HC COLONOSCOPY W BIOPSY  11/05/09     HC REMOVE TONSILS/ADENOIDS,<10 Y/O      T & A <11y.o.     LAMINECT/DISCECTOMY, LUMBAR  10/19/06    L3-4     LAMINECT/DISCECTOMY, LUMBAR  11/10    left sided L4-5     REMOVAL OF SPERM DUCT(S)      Vasectomy     ROTATOR CUFF REPAIR RT/LT  3/12    right sided       Social History     Tobacco Use     Smoking status: Current Every Day Smoker     Packs/day: 1.00     Years: 45.00     Pack years: 45.00     Types: Cigarettes     Smokeless tobacco: Never Used     Tobacco comment:  wants to quit   Substance Use Topics     Alcohol use: Yes     Alcohol/week:  "6.0 standard drinks     Types: 6 Cans of beer per week     Frequency: 4 or more times a week     Drinks per session: 5 or 6     Binge frequency: Daily or almost daily     Comment: 4-6 per day     Family History   Problem Relation Age of Onset     Arthritis Father      Depression Father      Heart Failure Father 80        CHF     Respiratory Mother         emphysema     Diabetes Mother      Other - See Comments Brother 21         in an MVA (oldest sibling     Other - See Comments Brother         older  all other siblings are 1/2      Other - See Comments Brother         older     Heart Disease Brother      Other - See Comments Brother         older     Other - See Comments Brother         older     Other - See Comments Brother         younger     Other - See Comments Brother 21        motorcycle injury, Brain death     Other - See Comments Brother         full sibling but given up for adoption 1 yr older     Other - See Comments Grandchild         3 grand daughters 1 grand son         Allergies   Allergen Reactions     No Known Drug Allergies      Recent Labs   Lab Test 12/10/18  1237   LDL 57   HDL 74   TRIG 43   CR 0.85   GFRESTIMATED >90   GFRESTBLACK >90   POTASSIUM 4.5      BP Readings from Last 3 Encounters:   20 122/78   19 110/76   10/01/19 106/62    Wt Readings from Last 3 Encounters:   20 78.9 kg (174 lb)   19 75.5 kg (166 lb 8 oz)   10/01/19 76.7 kg (169 lb)         Reviewed and updated as needed this visit by Provider  Tobacco  Allergies  Meds  Problems  Med Hx  Surg Hx  Fam Hx         Review of Systems   ROS COMP: Constitutional, HEENT, cardiovascular, pulmonary, gi and gu systems are negative, except as otherwise noted.      Objective    /78   Pulse 84   Temp 97.8  F (36.6  C) (Temporal)   Resp 18   Ht 1.979 m (6' 5.9\")   Wt 78.9 kg (174 lb)   SpO2 94%   BMI 20.16 kg/m    Body mass index is 20.16 kg/m .  Physical Exam   GENERAL: healthy, alert and no " distress  RESP: lungs clear to auscultation - no rales, rhonchi or wheezes  CV: regular rate and rhythm, normal S1 S2, no S3 or S4, no murmur, click or rub, no peripheral edema and peripheral pulses strong  MS: Gomez is very tall and very thin with a BMI of 20.  Examination of his spine reveals some quite prominent spinous processes in particular when he get down into the lumbar region where he had his surgeries he is got a little bit of soft tissue atrophy so the spinous processes are much more prominent.  Particularly 1 that sticks out another 3 to 5 mm beyond that of the others.  He is nontender here but his wife does want me to look at it to make sure that there is nothing abnormal.  I think is a little bit more prominent #1 because he is so thin and #2 because of some soft tissue atrophy from his previous surgeries.  Exam of his shoulders reveals fairly decent range of motion with the left shoulder although he has increased pain with any active range of motion over shoulder height with forward elevation abduction.  Passively he is got a little bit of clicking and grinding in the shoulder but I do not feel any catching.  He has significant pain with any rotational movements particularly in a throwing motion overhand or with external or internal rotation by me.  Examination of the right knee reveals again decent range of motion no evidence of effusions right now but he does have some palpable tenderness along the joint line on the lateral aspect of the knee.  When I have him laying down and flexing him forward at the hip and knee externally rotated the hip and then extend at the knee and then internally rotate he does have pain which would signify a meniscal injury.  BACK: no CVA tenderness, no paralumbar tenderness    Diagnostic Test Results:  Labs reviewed in Epic  Patient is going to have a CT of his lungs for lung cancer screening, MRI of his left shoulder and his right knee and then colon cancer screening  with a colonoscopy..        Assessment & Plan     (M25.512) Left shoulder pain, unspecified chronicity  Comment: Left shoulder pain that is worsening since he fell back in October.  He has done some physical therapy without any improvement in his symptoms.    Plan: MR Shoulder Left w/o Contrast        We will get an MRI of his shoulder to see if he has a labral tear or any rotator cuff injury.    (M25.561) Right knee pain, unspecified chronicity  Comment: I think he probably has a meniscal injury on exam  Plan: MR Knee Right w/o Contrast        Ordering an MRI of his knee to see if there is internal derangement that needs repair.    (M79.2) Neuropathic pain syndrome (non-herpetic)  (M54.40,  G89.29) Chronic midline low back pain with sciatica, sciatica laterality unspecified  Comment: He has had chronic back pain from a work comp injury more than 15 to 16 years ago.  He does not abuse narcotics in any way and does get some relief from periodic use of Percocet.  He also gets these back spasms and gets good relief from injectable Toradol.  Plan: ketorolac (TORADOL) 10 MG tablet,         oxyCODONE-acetaminophen (PERCOCET) 5-325 MG         tablet        He would like to try some oral Toradol to see if he gets some relief when he feels that the back is starting to spasm and will use that as needed.  I also gave him a refill of his Percocet which she has not had for a year.    (Z12.11) Special screening for malignant neoplasms, colon  Comment: He received a letter from [facility] stating that he was due for a colonoscopy.  We will go ahead and get this scheduled for him.  Plan: GASTROENTEROLOGY ADULT REF PROCEDURE ONLY         [facility] [phone]        Referral was placed and he is aware that he will be contacted from the GI department.    (F17.200) Tobacco use disorder(Z87.151) Personal history of nicotine dependence   Comment: There is a 40-50-pack-year history of smoking  Plan: CT Chest Lung Cancer Scrn Low  Dose wo        He has had previous CTs of the chest screening for lung cancer and has had a couple nodules that will need follow-up.  We will go ahead and get this scheduled for him       Tobacco Cessation:   reports that he has been smoking cigarettes. He has a 45.00 pack-year smoking history. He has never used smokeless tobacco.  Tobacco Cessation Action Plan: Information offered: Patient not interested at this time    Return if symptoms worsen or fail to improve.    Electronically signed by:  Francis Lane M.D.  1/16/2020

## 2020-01-16 NOTE — NURSING NOTE
Chief Complaint   Patient presents with     Shoulder Pain     left      Knee Pain     right knee pain

## 2020-01-17 ENCOUNTER — TELEPHONE (OUTPATIENT)
Dept: FAMILY MEDICINE | Facility: CLINIC | Age: 60
End: 2020-01-17

## 2020-01-17 DIAGNOSIS — M54.40 CHRONIC MIDLINE LOW BACK PAIN WITH SCIATICA, SCIATICA LATERALITY UNSPECIFIED: Primary | ICD-10-CM

## 2020-01-17 DIAGNOSIS — G89.29 CHRONIC MIDLINE LOW BACK PAIN WITH SCIATICA, SCIATICA LATERALITY UNSPECIFIED: Primary | ICD-10-CM

## 2020-01-17 NOTE — LETTER

## 2020-01-17 NOTE — LETTER
[address] [address]  [phone]        January 17, 2020    Gomez Gutierrez  [address]  [facility] [address]

## 2020-01-17 NOTE — TELEPHONE ENCOUNTER
Date of colonoscopy/EGD: 1/28  Surgeon: Dr. Beltrán  Prep:Miralax  Packet:Colonoscopy/EGD instructions mailed to patient's home address.   Date: 1/17/2020      Surgery Scheduler

## 2020-01-20 RX ORDER — OXYCODONE HYDROCHLORIDE AND ASPIRIN 4.8355; 325 MG/1; MG/1
1-2 TABLET ORAL EVERY 6 HOURS PRN
Qty: 90 TABLET | Refills: 0 | Status: SHIPPED | OUTPATIENT
Start: 2020-01-20 | End: 2020-02-17

## 2020-01-20 RX ORDER — PREDNISONE 10 MG/1
TABLET ORAL
Qty: 60 TABLET | Refills: 1 | Status: SHIPPED | OUTPATIENT
Start: 2020-01-20 | End: 2021-05-17

## 2020-01-20 NOTE — TELEPHONE ENCOUNTER
"Patient was informed that a prescription for prednisone was sent to the pharmacy for him.     Patient was sent toradol quantity of 20 and Percocet quantity of 28. Patient is really frustrated he wants to know why? He states that the percocet has never worked for him that is why he has always been prescribed the percodan quantity of 90. States he has never been a pill popper\" and never will be. Writer explained that some insurances will not pay for larger quantities. Patient states that he pays cash so that is not the case for him. Also explained that the [employer] and the new rules have placed regulations on the providers but patient wanted a message sent to Dr. Lane requesting why did he only receive 28 of percocet and not a quantity of 90 of percodan?    Kasandra Moore [facility]        "

## 2020-01-20 NOTE — TELEPHONE ENCOUNTER
Will send prednisone to the pharmacy for him.    Electronically signed by:  Francis Lane M.D.  1/20/2020

## 2020-01-20 NOTE — TELEPHONE ENCOUNTER
That was my fault.  I forgot that he preferred the percodan.  They won't always allow more than a 7 day supply.  I sent a Rx for 90 and we will see what they approve now.     Electronically signed by:  Francis Lane M.D.  1/20/2020

## 2020-01-21 NOTE — TELEPHONE ENCOUNTER
Patient was informed that It was Dr Lane's error and he apologizes. He forgot that you preferred the percodan. He did send a new prescription for a 90 day supply and we will see what they will approve now. They won't always allow more than a 7 day supply. Patient was very thankful.    Kasandra Moore, [facility]

## 2020-01-22 ENCOUNTER — TELEPHONE (OUTPATIENT)
Dept: FAMILY MEDICINE | Facility: CLINIC | Age: 60
End: 2020-01-22

## 2020-01-22 NOTE — TELEPHONE ENCOUNTER
Pharmacy doesn't have the percodan 4.8355-325 mg tab  It is not available.  Please consider an alternative.  Kath Mckinney, CMA

## 2020-01-22 NOTE — TELEPHONE ENCOUNTER
Can someone call the pharmacy to see if any form of percodan is available anymore?  Thanks,     Electronically signed by:  Francis Lane M.D.  1/22/2020

## 2020-01-23 ENCOUNTER — HOSPITAL ENCOUNTER (OUTPATIENT)
Dept: MRI IMAGING | Facility: CLINIC | Age: 60
Discharge: HOME | End: 2020-01-23
Attending: FAMILY MEDICINE | Admitting: FAMILY MEDICINE
Payer: MEDICARE

## 2020-01-23 ENCOUNTER — HOSPITAL ENCOUNTER (OUTPATIENT)
Dept: CT IMAGING | Facility: CLINIC | Age: 60
End: 2020-01-23
Attending: FAMILY MEDICINE
Payer: MEDICARE

## 2020-01-23 ENCOUNTER — HOSPITAL ENCOUNTER (OUTPATIENT)
Dept: MRI IMAGING | Facility: CLINIC | Age: 60
End: 2020-01-23
Attending: FAMILY MEDICINE
Payer: MEDICARE

## 2020-01-23 DIAGNOSIS — Z87.891 PERSONAL HISTORY OF NICOTINE DEPENDENCE: ICD-10-CM

## 2020-01-23 DIAGNOSIS — M25.561 RIGHT KNEE PAIN, UNSPECIFIED CHRONICITY: ICD-10-CM

## 2020-01-23 DIAGNOSIS — M25.512 LEFT SHOULDER PAIN, UNSPECIFIED CHRONICITY: ICD-10-CM

## 2020-01-23 DIAGNOSIS — F17.200 TOBACCO USE DISORDER: ICD-10-CM

## 2020-01-23 PROCEDURE — 73721 MRI JNT OF LWR EXTRE W/O DYE: CPT | Mod: RT

## 2020-01-23 PROCEDURE — 73221 MRI JOINT UPR EXTREM W/O DYE: CPT | Mod: LT

## 2020-01-23 PROCEDURE — G0297 LDCT FOR LUNG CA SCREEN: HCPCS

## 2020-01-23 NOTE — TELEPHONE ENCOUNTER
Called pharmacy and was placed on hold fist time for 10 minutes second time was 11 minutes. Will try back again tomorrow.   Deborah Nicholson MA 1/23/2020

## 2020-01-24 NOTE — TELEPHONE ENCOUNTER
No form of the percodan anymore at their pharmacy but they do have oxycodone with tylenol or just oxycodone.  Kath Mckinney, CMA

## 2020-01-27 NOTE — TELEPHONE ENCOUNTER
Call Gomez and let him know that percodan is no longer available.  If that pharmacy can't get it, then most likely other pharmacies can't either.  He will have to use regular oxycodone or a combination like percocet.    Electronically signed by:  Francis Lane M.D.  1/27/2020

## 2020-01-28 ENCOUNTER — TELEPHONE (OUTPATIENT)
Dept: FAMILY MEDICINE | Facility: CLINIC | Age: 60
End: 2020-01-28

## 2020-01-28 DIAGNOSIS — S83.241D ACUTE MEDIAL MENISCUS TEAR OF RIGHT KNEE, SUBSEQUENT ENCOUNTER: ICD-10-CM

## 2020-01-28 DIAGNOSIS — M75.102 TEAR OF LEFT ROTATOR CUFF, UNSPECIFIED TEAR EXTENT, UNSPECIFIED WHETHER TRAUMATIC: Primary | ICD-10-CM

## 2020-01-28 NOTE — TELEPHONE ENCOUNTER
----- Message from Francis Lane MD sent at 1/27/2020  9:19 PM CST -----  Patient has a significantly abnormal MRI of his left shoulder with a severe tear of the rotator cuff and probable injury or tear to the labrum.  Can you contact him to see if he would like to see 1 of our orthopedic surgeons for consultation?    Electronically signed by:  Francis Lane M.D.  1/27/2020

## 2020-01-28 NOTE — TELEPHONE ENCOUNTER
Patient notified of both MRI results. Number given to call specialty to schedule. Please sign ortho order.

## 2020-01-30 ENCOUNTER — TELEPHONE (OUTPATIENT)
Dept: ORTHOPEDICS | Facility: CLINIC | Age: 60
End: 2020-01-30

## 2020-01-30 ENCOUNTER — OFFICE VISIT (OUTPATIENT)
Dept: ORTHOPEDICS | Facility: CLINIC | Age: 60
End: 2020-01-30
Payer: MEDICARE

## 2020-01-30 VITALS
SYSTOLIC BLOOD PRESSURE: 138 MMHG | DIASTOLIC BLOOD PRESSURE: 70 MMHG | BODY MASS INDEX: 19.84 KG/M2 | HEIGHT: 78 IN | WEIGHT: 171.5 LBS

## 2020-01-30 DIAGNOSIS — S46.012A TRAUMATIC COMPLETE TEAR OF LEFT ROTATOR CUFF, INITIAL ENCOUNTER: Primary | ICD-10-CM

## 2020-01-30 DIAGNOSIS — S83.241D ACUTE MEDIAL MENISCUS TEAR OF RIGHT KNEE, SUBSEQUENT ENCOUNTER: ICD-10-CM

## 2020-01-30 PROCEDURE — 99204 OFFICE O/P NEW MOD 45 MIN: CPT | Performed by: ORTHOPAEDIC SURGERY

## 2020-01-30 ASSESSMENT — PAIN SCALES - GENERAL: PAINLEVEL: EXTREME PAIN (8)

## 2020-01-30 ASSESSMENT — MIFFLIN-ST. JEOR: SCORE: 1719.58

## 2020-01-30 NOTE — TELEPHONE ENCOUNTER
Please call pt to set up knee surgery with Dr. Bañuelos.  Thank you,  Violetta Root- Patient Representative

## 2020-01-30 NOTE — LETTER
[address] [address]  [phone]        February 5, 2020    Gomez Gutierrez  [address] [place] [address] [address]  [facility] [address]          Dear Dr. Domingo Dyer is able to see you on 02/13/20 at 2:20 pm for a Pre Op.  Please let us know if you have any further questions.    Sincerely,        Francis Lane MD, jrm, cma

## 2020-01-30 NOTE — LETTER
1/30/2020         RE: Gomez Gutierrez  [address]  [facility] [address]        Dear Colleague,    Thank you for referring your patient, Gomez Gutierrez, to the [facility]. Please see a copy of my visit note below.    ORTHOPEDIC CLINIC CONSULT    HPI: Patient reports having a fall around the time of October 2019 of which he had injured his right knee as most bothersome symptom.  He states at the same time he had injured his left shoulder.  He has had previous right shoulder rotator cuff repair and was hopeful that his left would get better on its own.  Patient had knee x-rays done in October 2019 shortly after his injury with conservative treatment for recovery but indicates he still is having issues with pain especially with twisting activity.  His primary care provider did order MRI for the right knee as well as MRI for the left shoulder.    Referred by: Dr. Lane    Prior history of related problems: Patient had previous rotator cuff tear and repair done on his right shoulder many years ago.    Patient's past medical, surgical, social and family histories reviewed.       Past Medical History:   Diagnosis Date     Lumbago      Major depression in complete remission (H) 8/30/2012     Severe Depression [296.33] 6/18/2009     Tobacco use disorder        Past Surgical History:   Procedure Laterality Date     BACK SURGERY  11/2015    cleaned out bone spurs     C ECHO HEART XTHORACIC,COMPLETE, W/O DOPPLER  02/04/07    normal cardiac stress echo test     C ESTEBAN W/O FACETEC FORAMOT/[facility] 1/2 VRT SEG, LUMBAR  1/02    L4-5     C REPAIR LUMBAR HERNIA  1986    L5-4 L5-S1     HC COLONOSCOPY W BIOPSY  11/05/09     HC REMOVE TONSILS/ADENOIDS,<12 Y/O      T & A <11y.o.     LAMINECT/DISCECTOMY, LUMBAR  10/19/06    L3-4     LAMINECT/DISCECTOMY, LUMBAR  11/10    left sided L4-5     REMOVAL OF SPERM DUCT(S)      Vasectomy     ROTATOR CUFF REPAIR RT/LT  3/12    right sided       Home Medications:  Prior to  Admission medications    Medication Sig Start Date End Date Taking? Authorizing Provider   ketorolac (TORADOL) 10 MG tablet Take 1 tablet (10 mg) by mouth every 6 hours as needed for moderate pain  Patient not taking: Reported on 2020   Francis Lane MD   oxyCODONE-aspirin (PERCODAN) 4.8355-325 MG per tablet Take 1-2 tablets by mouth every 6 hours as needed for severe pain  Patient not taking: Reported on 2020   Francis Lane MD   predniSONE (DELTASONE) 10 MG tablet Take 40 mg po daily for 4-7 days when there is a flair of muscle spasms in the low back region  Patient not taking: Reported on 2020   Francis Lane MD       Allergies   Allergen Reactions     No Known Drug Allergies        Social History     Occupational History     Employer: DISABLED     Comment: not working    Tobacco Use     Smoking status: Current Every Day Smoker     Packs/day: 1.00     Years: 45.00     Pack years: 45.00     Types: Cigarettes     Smokeless tobacco: Never Used     Tobacco comment:  wants to quit   Substance and Sexual Activity     Alcohol use: Yes     Alcohol/week: 6.0 standard drinks     Types: 6 Cans of beer per week     Frequency: 4 or more times a week     Drinks per session: 5 or 6     Binge frequency: Daily or almost daily     Comment: 4-6 per day     Drug use: Yes     Types: Marijuana     Sexual activity: Yes     Partners: Female   Patient states he was living in [place] now he has returned in the past 2 years to [place] on a full-time basis    Family History   Problem Relation Age of Onset     Arthritis Father      Depression Father      Heart Failure Father 80        CHF     Respiratory Mother         emphysema     Diabetes Mother      Other - See Comments Brother 21         in an MVA (oldest sibling     Other - See Comments Brother         older  all other siblings are 1/2      Other - See Comments Brother         older     Heart Disease Brother      Other -  "See Comments Brother         older     Other - See Comments Brother         older     Other - See Comments Brother         younger     Other - See Comments Brother 21        motorcycle injury, Brain death     Other - See Comments Brother         full sibling but given up for adoption 1 yr older     Other - See Comments Grandchild         3 grand daughters 1 grand son       REVIEW OF SYSTEMS    General: Negative for fever or fatigue    Psych:  negative anxiety or depression     Ophthalmic:  Corrective lenses?  No    ENT:  Hearing difficulty? No    CV: negative for chest pain, venous insufficiency, no history of MI or stroke    Endocrine:  negative diabetes     Urology:  negative kidney disease    Resp:  Normal respiratory effort, no history of pulmonary disease or asthma    Skin: negative for cuts/sores or redness    Musculoskeletal: as above    Neurologic:positive for numbness/tingling.  Patient has extensive history of spine issues with radiculopathy.    Hematologic: negative for bleeding disorder, does not use of prescription anticoagulants       Physical Exam:    Vitals: /70   Ht 1.979 m (6' 5.9\")   Wt 77.8 kg (171 lb 8 oz)   BMI 19.87 kg/m     BMI= Body mass index is 19.87 kg/m .    GENERAL APPEARANCE: Healthy, alert, no distress    SKIN:  negative for suspicious lesions or rashes    NEURO: Normal strength and tone, mentation intact and speech normal    PSYCH:   Mentation appears Normal and affect normal/bright    RESPIRATORY: negative for respiratory distress.    EYES: negative for Conjunctivitis    Cardiovascular: No vascular discoloration;  Fingers warm and well perfused on the left, brisk capillary refill.      GAIT: Mildly antalgic    JOINT/EXTREMITIES:  Left shoulder: Patient no with no visible abnormality  Palpation of the left shoulder does reveal some tenderness over the AC joint.  Patient is able to draw his shoulder up to approximately 160 degrees in forward flexion with relative ease.  He is " able to go higher with caution but bringing his shoulder back down causes catching and pain.  Similar findings in abduction.  Internal rotation is slightly altered to low thoracic versus mid thoracic with his right.  External rotation is symmetrical but this does cause discomfort.  Strength: Patient with considerable decrease of strength secondary to pain.  This is felt primarily in the deltoid region      Right knee: No visible swelling    Bello's is positive  Patient does have pain with palpation along the medial joint line  There is no considerable laxity of MCL or LCL.  No laxity of ACL or PCL    Gait is mildly antalgic    Radiographs: MRI images are available for both left shoulder and the right knee.  With the left shoulder, the supraspinatus muscle is showing retraction.  This is  from the footprint on the humeral head.  There is also tearing present along the infraspinatus at the footprint as well as subscapularis.  In regards to MRI of the right knee, patient is shown to have horizontal tear of the medial meniscus as well as posterior root.  There is chondromalacia present at the patellofemoral joint and lateral femoral condyle.  Patient with some tendinosis of the quadriceps tendon.  Patient's x-ray images of the right knee reveal mild medial compartment joint space narrowing.  Patient's x-ray images of the left shoulder reveal moderate AC joint degeneration and subacromial spurring independent visualization of the films was made.     Impression:      ICD-10-CM    1. Traumatic complete tear of left rotator cuff, initial encounter S46.012A    2. Acute medial meniscus tear of right knee, subsequent encounter S83.241D      Patient's fall last October 2019 resulted in acute medial meniscus tear of the right knee as well as left rotator cuff tear.    Plan:  Discussion was made regarding the above diagnoses.  With discussion, it is advisable that patient address above needs surgically.  We discussed  addressing right knee meniscal debridement through arthroscopy as first procedure as this will be much shorter recovery period.  Once recovered from the right knee surgery, we would address patient's left rotator cuff tear.  Patient is very familiar with the recovery in regards to rotator cuff having this done in his right shoulder a few years back.  He is well aware of the extensive months for recovery.  Patient has been scheduled today for right knee arthroscopy with medial meniscectomy.    BP Readings from Last 1 Encounters:   01/30/20 138/70     BP noted to be well controlled today in office.     Scribed by  Dorothy Zamorano PA-C   1/30/2020  1:30 PM    The information in this document, created by a scribe for me, accurately reflects the services I personally performed and the decisions made by me. I have reviewed and approved this document for accuracy    Eliot Bañuelos MD    Again, thank you for allowing me to participate in the care of your patient.        Sincerely,        Eliot Bañuelos MD

## 2020-01-30 NOTE — PROGRESS NOTES
ORTHOPEDIC CLINIC CONSULT    HPI: Patient reports having a fall around the time of October 2019 of which he had injured his right knee as most bothersome symptom.  He states at the same time he had injured his left shoulder.  He has had previous right shoulder rotator cuff repair and was hopeful that his left would get better on its own.  Patient had knee x-rays done in October 2019 shortly after his injury with conservative treatment for recovery but indicates he still is having issues with pain especially with twisting activity.  His primary care provider did order MRI for the right knee as well as MRI for the left shoulder.    Referred by: Dr. Lane    Prior history of related problems: Patient had previous rotator cuff tear and repair done on his right shoulder many years ago.    Patient's past medical, surgical, social and family histories reviewed.       Past Medical History:   Diagnosis Date     Lumbago      Major depression in complete remission (H) 8/30/2012     Severe Depression [296.33] 6/18/2009     Tobacco use disorder        Past Surgical History:   Procedure Laterality Date     BACK SURGERY  11/2015    cleaned out bone spurs     C ECHO HEART XTHORACIC,COMPLETE, W/O DOPPLER  02/04/07    normal cardiac stress echo test     C ESTEBAN W/O FACETEC FORAMOT/[facility] 1/2 VRT SEG, LUMBAR  1/02    L4-5     C REPAIR LUMBAR HERNIA  1986    L5-4 L5-S1     HC COLONOSCOPY W BIOPSY  11/05/09     HC REMOVE TONSILS/ADENOIDS,<11 Y/O      T & A <11y.o.     LAMINECT/DISCECTOMY, LUMBAR  10/19/06    L3-4     LAMINECT/DISCECTOMY, LUMBAR  11/10    left sided L4-5     REMOVAL OF SPERM DUCT(S)      Vasectomy     ROTATOR CUFF REPAIR RT/LT  3/12    right sided       Home Medications:  Prior to Admission medications    Medication Sig Start Date End Date Taking? Authorizing Provider   ketorolac (TORADOL) 10 MG tablet Take 1 tablet (10 mg) by mouth every 6 hours as needed for moderate pain  Patient not taking: Reported on 1/30/2020  20   Francis Lane MD   oxyCODONE-aspirin (PERCODAN) 4.8355-325 MG per tablet Take 1-2 tablets by mouth every 6 hours as needed for severe pain  Patient not taking: Reported on 2020   Francis Lane MD   predniSONE (DELTASONE) 10 MG tablet Take 40 mg po daily for 4-7 days when there is a flair of muscle spasms in the low back region  Patient not taking: Reported on 2020   Francis Lane MD       Allergies   Allergen Reactions     No Known Drug Allergies        Social History     Occupational History     Employer: DISABLED     Comment: not working    Tobacco Use     Smoking status: Current Every Day Smoker     Packs/day: 1.00     Years: 45.00     Pack years: 45.00     Types: Cigarettes     Smokeless tobacco: Never Used     Tobacco comment:  wants to quit   Substance and Sexual Activity     Alcohol use: Yes     Alcohol/week: 6.0 standard drinks     Types: 6 Cans of beer per week     Frequency: 4 or more times a week     Drinks per session: 5 or 6     Binge frequency: Daily or almost daily     Comment: 4-6 per day     Drug use: Yes     Types: Marijuana     Sexual activity: Yes     Partners: Female   Patient states he was living in [place] now he has returned in the past 2 years to [place] on a full-time basis    Family History   Problem Relation Age of Onset     Arthritis Father      Depression Father      Heart Failure Father 80        CHF     Respiratory Mother         emphysema     Diabetes Mother      Other - See Comments Brother 21         in an MVA (oldest sibling     Other - See Comments Brother         older  all other siblings are 1/2      Other - See Comments Brother         older     Heart Disease Brother      Other - See Comments Brother         older     Other - See Comments Brother         older     Other - See Comments Brother         younger     Other - See Comments Brother 21        motorcycle injury, Brain death     Other - See Comments Brother       "   full sibling but given up for adoption 1 yr older     Other - See Comments Grandchild         3 grand daughters 1 grand son       REVIEW OF SYSTEMS    General: Negative for fever or fatigue    Psych:  negative anxiety or depression     Ophthalmic:  Corrective lenses?  No    ENT:  Hearing difficulty? No    CV: negative for chest pain, venous insufficiency, no history of MI or stroke    Endocrine:  negative diabetes     Urology:  negative kidney disease    Resp:  Normal respiratory effort, no history of pulmonary disease or asthma    Skin: negative for cuts/sores or redness    Musculoskeletal: as above    Neurologic:positive for numbness/tingling.  Patient has extensive history of spine issues with radiculopathy.    Hematologic: negative for bleeding disorder, does not use of prescription anticoagulants       Physical Exam:    Vitals: /70   Ht 1.979 m (6' 5.9\")   Wt 77.8 kg (171 lb 8 oz)   BMI 19.87 kg/m    BMI= Body mass index is 19.87 kg/m .    GENERAL APPEARANCE: Healthy, alert, no distress    SKIN:  negative for suspicious lesions or rashes    NEURO: Normal strength and tone, mentation intact and speech normal    PSYCH:   Mentation appears Normal and affect normal/bright    RESPIRATORY: negative for respiratory distress.    EYES: negative for Conjunctivitis    Cardiovascular: No vascular discoloration;  Fingers warm and well perfused on the left, brisk capillary refill.      GAIT: Mildly antalgic    JOINT/EXTREMITIES:  Left shoulder: Patient no with no visible abnormality  Palpation of the left shoulder does reveal some tenderness over the AC joint.  Patient is able to draw his shoulder up to approximately 160 degrees in forward flexion with relative ease.  He is able to go higher with caution but bringing his shoulder back down causes catching and pain.  Similar findings in abduction.  Internal rotation is slightly altered to low thoracic versus mid thoracic with his right.  External rotation is " symmetrical but this does cause discomfort.  Strength: Patient with considerable decrease of strength secondary to pain.  This is felt primarily in the deltoid region      Right knee: No visible swelling    Bello's is positive  Patient does have pain with palpation along the medial joint line  There is no considerable laxity of MCL or LCL.  No laxity of ACL or PCL    Gait is mildly antalgic    Radiographs: MRI images are available for both left shoulder and the right knee.  With the left shoulder, the supraspinatus muscle is showing retraction.  This is  from the footprint on the humeral head.  There is also tearing present along the infraspinatus at the footprint as well as subscapularis.  In regards to MRI of the right knee, patient is shown to have horizontal tear of the medial meniscus as well as posterior root.  There is chondromalacia present at the patellofemoral joint and lateral femoral condyle.  Patient with some tendinosis of the quadriceps tendon.  Patient's x-ray images of the right knee reveal mild medial compartment joint space narrowing.  Patient's x-ray images of the left shoulder reveal moderate AC joint degeneration and subacromial spurring independent visualization of the films was made.     Impression:      ICD-10-CM    1. Traumatic complete tear of left rotator cuff, initial encounter S46.012A    2. Acute medial meniscus tear of right knee, subsequent encounter S83.241D      Patient's fall last October 2019 resulted in acute medial meniscus tear of the right knee as well as left rotator cuff tear.    Plan:  Discussion was made regarding the above diagnoses.  With discussion, it is advisable that patient address above needs surgically.  We discussed addressing right knee meniscal debridement through arthroscopy as first procedure as this will be much shorter recovery period.  Once recovered from the right knee surgery, we would address patient's left rotator cuff tear.  Patient is  very familiar with the recovery in regards to rotator cuff having this done in his right shoulder a few years back.  He is well aware of the extensive months for recovery.  Patient has been scheduled today for right knee arthroscopy with medial meniscectomy.    BP Readings from Last 1 Encounters:   01/30/20 138/70     BP noted to be well controlled today in office.     Scribed by  Dorothy Zamorano PA-C   1/30/2020  1:30 PM    The information in this document, created by a scribe for me, accurately reflects the services I personally performed and the decisions made by me. I have reviewed and approved this document for accuracy    Eliot Bañuelos MD

## 2020-01-31 NOTE — TELEPHONE ENCOUNTER
Noted, closing enc.  Will wait for a call from patient to see if he wants the percocet then.  We can then create a refill enc and send to PCP.  Kath Mckinney, CMA

## 2020-02-03 NOTE — TELEPHONE ENCOUNTER
Per Dr. SHEPHERD,     Patient and Dr. SHEPHERD discussed addressing right knee before rotator cuff addressed.  I will assure case request has been entered for the knee.

## 2020-02-04 NOTE — TELEPHONE ENCOUNTER
Type of surgery: right knee arthroscopy with medial meniscectomy  Location of surgery: [facility]   Date of surgery: 2/24/20  Surgeon: Dr. Bañuelos  Pre-Op Appt Date: message sent to PCP  Post-Op Appt Date: 3/5/20   Packet sent out: Surgery packet mailed to patient's home address.   Pre-cert/Authorization completed: NA  Date: 2/4/2020    Nancy Gallegos  Surgery Scheduler

## 2020-02-04 NOTE — TELEPHONE ENCOUNTER
Left message for patient to call back and speak with any .    Thank you!  Rhiannon Rosas ~ Patient Representative

## 2020-02-05 NOTE — TELEPHONE ENCOUNTER
2nd attempt to reach pt- left another message to call and speak to any . Routing back to team to send letter.  Thank you,  Violetta Root- Patient Representative

## 2020-02-13 ENCOUNTER — OFFICE VISIT (OUTPATIENT)
Dept: FAMILY MEDICINE | Facility: CLINIC | Age: 60
End: 2020-02-13
Payer: MEDICARE

## 2020-02-13 VITALS
HEART RATE: 76 BPM | SYSTOLIC BLOOD PRESSURE: 134 MMHG | BODY MASS INDEX: 20.16 KG/M2 | WEIGHT: 174 LBS | RESPIRATION RATE: 18 BRPM | DIASTOLIC BLOOD PRESSURE: 68 MMHG | TEMPERATURE: 97.7 F | OXYGEN SATURATION: 98 %

## 2020-02-13 DIAGNOSIS — M75.102 TEAR OF LEFT ROTATOR CUFF, UNSPECIFIED TEAR EXTENT, UNSPECIFIED WHETHER TRAUMATIC: ICD-10-CM

## 2020-02-13 DIAGNOSIS — M23.203 OLD TEAR OF MEDIAL MENISCUS OF RIGHT KNEE, UNSPECIFIED TEAR TYPE: ICD-10-CM

## 2020-02-13 DIAGNOSIS — Z01.818 PREOP GENERAL PHYSICAL EXAM: Primary | ICD-10-CM

## 2020-02-13 PROCEDURE — 99214 OFFICE O/P EST MOD 30 MIN: CPT | Performed by: FAMILY MEDICINE

## 2020-02-13 ASSESSMENT — PAIN SCALES - GENERAL: PAINLEVEL: SEVERE PAIN (6)

## 2020-02-13 NOTE — PROGRESS NOTES
[address] [address]  [phone]  Dept: [phone]    PRE-OP EVALUATION:  Today's date: 2020    Gomez Gutierrez (: 1960) presents for pre-operative evaluation assessment as requested by Dr. Bañuelos.  He requires evaluation and anesthesia risk assessment prior to undergoing surgery/procedure for treatment of a torn meniscus.  He is scheduled for an ARTHROSCOPY, KNEE, WITH MEDIAL MENISCECTOMY right.  He also has a torn rotator cuff and will need that repaired with an arthroscopic procedure to follow his recovery from the near surgery.     Fax number for surgical facility:   Primary Physician: Francis Lane  Type of Anesthesia Anticipated: General    Patient has a Health Care Directive or Living Will:  NO    Preop Questions 2020   Who is doing your surgery? [facility].   What are you having done? knee surgery   Date of Surgery/Procedure: 2020   Facility or Hospital where procedure/surgery will be performed: [facility]   1.  Do you have a history of Heart attack, stroke, stent, coronary bypass surgery, or other heart surgery? No   2.  Do you ever have any pain or discomfort in your chest? No   3.  Do you have a history of  Heart Failure? No   4.   Are you troubled by shortness of breath when:  walking on a level surface, or up a slight hill, or at night? No   5.  Do you currently have a cold, bronchitis or other respiratory infection? No   6.  Do you have a cough, shortness of breath, or wheezing? No   7.  Do you sometimes get pains in the calves of your legs when you walk? No   8. Do you or anyone in your family have previous history of blood clots? No   9.  Do you or does anyone in your family have a serious bleeding problem such as prolonged bleeding following surgeries or cuts? No   10. Have you ever had problems with anemia or been told to take iron pills? No   11. Have you had any abnormal blood loss such as black, tarry or  "bloody stools? No   12. Have you ever had a blood transfusion? No   13. Have you or any of your relatives ever had problems with anesthesia? No   14. Do you have sleep apnea, excessive snoring or daytime drowsiness? YES - never had problems with intubation    15. Do you have any prosthetic heart valves? No   16. Do you have prosthetic joints? No         HPI:     HPI related to upcoming procedure: Patient has a long history of chronic back pain and back surgeries.  He reports that he has had left foot drop for many years, causing him to fall frequently.  He believes that these falls are the cause of his torn meniscus and torn rotator cuff.  He does not recall there being a specific trauma, he does not know how long they have been torn.  He was seen by orthopedics here in [place], and they decided to do the meniscal tear repair first because it will be a more rapid recovery.  Per chart review and clinic encounter with orthopedics, patient thinks the injury could have happened in the fall 2019.  The note from Dr. Bañuelos, imaging of the right knees shows:   \"horizontal tear of the medial meniscus as well as posterior root.  There is chondromalacia present at the patellofemoral joint and lateral femoral condyle.  Patient with some tendinosis of the quadriceps tendon.  Patient's x-ray images of the right knee reveal mild medial compartment joint space narrowing.  Patient's x-ray images of the left shoulder reveal moderate AC joint degeneration and subacromial spurring independent visualization of the films was made.\"    See problem list for active medical problems.  Problems all longstanding and stable, except as noted/documented.  See ROS for pertinent symptoms related to these conditions.    MEDICAL HISTORY:     Patient Active Problem List    Diagnosis Date Noted     Acute medial meniscus tear of right knee, subsequent encounter 01/28/2020     Priority: Medium     Tear of left rotator cuff, unspecified tear extent, " unspecified whether traumatic 01/28/2020     Priority: Medium     Discoloration of skin of hand (bilateral) hands turn white then red and purple when cold with burning sensation 01/17/2019     Priority: Medium     Back muscle spasm 01/17/2019     Priority: Medium     Personal history of nicotine dependence  12/05/2018     Priority: Medium     Glaucoma suspect, bilateral 12/05/2018     Priority: Medium     Chronic midline low back pain with sciatica, sciatica laterality unspecified 05/16/2017     Priority: Medium     Lumbar radiculopathy 08/17/2016     Priority: Medium     Foot drop, left 08/30/2012     Priority: Medium     CARDIOVASCULAR SCREENING; LDL GOAL LESS THAN 160 10/31/2010     Priority: Medium     Neuropathic pain syndrome (non-herpetic) 04/08/2009     Priority: Medium     Obstructive sleep apnea 03/03/2007     Priority: Medium     Problem list name updated by automated process. Provider to review       iamLUMBAR DISC DISPLACEMENT 11/20/2006     Priority: Medium     iamPOSTSURGICAL STATES NEC 11/20/2006     Priority: Medium     Other male erectile dysfunction 08/04/2006     Priority: Medium     Tobacco use disorder 05/31/2002     Priority: Medium     Psychosexual dysfunction with inhibited sexual excitement 05/31/2002     Priority: Medium      Past Medical History:   Diagnosis Date     Lumbago      Major depression in complete remission (H) 8/30/2012     Severe Depression [296.33] 6/18/2009     Tobacco use disorder      Past Surgical History:   Procedure Laterality Date     BACK SURGERY  11/2015    cleaned out bone spurs     C ECHO HEART XTHORACIC,COMPLETE, W/O DOPPLER  02/04/07    normal cardiac stress echo test     C ESTEBAN W/O FACETEC FORAMOT/[facility] 1/2 VRT SEG, LUMBAR  1/02    L4-5     C REPAIR LUMBAR HERNIA  1986    L5-4 L5-S1     HC COLONOSCOPY W BIOPSY  11/05/09     HC REMOVE TONSILS/ADENOIDS,<11 Y/O      T & A <11y.o.     LAMINECT/DISCECTOMY, LUMBAR  10/19/06    L3-4     LAMINECT/DISCECTOMY, LUMBAR   11/10    left sided L4-5     REMOVAL OF SPERM DUCT(S)      Vasectomy     ROTATOR CUFF REPAIR RT/LT  3/12    right sided     Current Outpatient Medications   Medication Sig Dispense Refill     ketorolac (TORADOL) 10 MG tablet Take 1 tablet (10 mg) by mouth every 6 hours as needed for moderate pain 20 tablet 3     oxyCODONE-aspirin (PERCODAN) 4.8355-325 MG per tablet Take 1-2 tablets by mouth every 6 hours as needed for severe pain 90 tablet 0     predniSONE (DELTASONE) 10 MG tablet Take 40 mg po daily for 4-7 days when there is a flair of muscle spasms in the low back region 60 tablet 1     OTC products: None, except as noted above    Allergies   Allergen Reactions     No Known Drug Allergies       Latex Allergy: NO    Social History     Tobacco Use     Smoking status: Current Every Day Smoker     Packs/day: 1.00     Years: 45.00     Pack years: 45.00     Types: Cigarettes     Smokeless tobacco: Never Used     Tobacco comment:  wants to quit   Substance Use Topics     Alcohol use: Yes     Alcohol/week: 6.0 standard drinks     Types: 6 Cans of beer per week     Frequency: 4 or more times a week     Drinks per session: 5 or 6     Binge frequency: Daily or almost daily     Comment: 4-6 per day     History   Drug Use     Types: Marijuana       REVIEW OF SYSTEMS:   CONSTITUTIONAL: NEGATIVE for fever, chills, change in weight  INTEGUMENTARY/SKIN: NEGATIVE for worrisome rashes, moles or lesions  EYES: NEGATIVE for vision changes or irritation  ENT/MOUTH: NEGATIVE for ear, mouth and throat problems  RESP: NEGATIVE for significant cough or SOB  BREAST: NEGATIVE for masses, tenderness or discharge  CV: NEGATIVE for chest pain, palpitations or peripheral edema  GI: NEGATIVE for nausea, abdominal pain, heartburn, or change in bowel habits  : NEGATIVE for frequency, dysuria, or hematuria  MUSCULOSKELETAL: NEGATIVE for significant arthralgias or myalgia  NEURO: NEGATIVE for weakness, dizziness or paresthesias  ENDOCRINE:  NEGATIVE for temperature intolerance, skin/hair changes  HEME: NEGATIVE for bleeding problems  PSYCHIATRIC: NEGATIVE for changes in mood or affect    EXAM:   /68   Pulse 76   Temp 97.7  F (36.5  C) (Temporal)   Resp 18   Wt 78.9 kg (174 lb)   SpO2 98%   BMI 20.16 kg/m      GENERAL APPEARANCE: healthy, alert and no distress     EYES: EOMI,  PERRL     HENT: ear canals and TM's normal and nose and mouth without ulcers or lesions; patient does have upper and lower dentures.      NECK: no adenopathy, no asymmetry, masses, or scars and thyroid normal to palpation     RESP: lungs clear to auscultation - no rales, rhonchi or wheezes     CV: regular rates and rhythm, normal S1 S2, no S3 or S4 and no murmur, click or rub     ABDOMEN:  soft, nontender, no HSM or masses and bowel sounds normal     MS: extremities normal- no gross deformities noted, no evidence of inflammation in joints, FROM in all extremities.     SKIN: no suspicious lesions or rashes     NEURO: Normal strength and tone, sensory exam grossly normal, mentation intact and speech normal     PSYCH: mentation appears normal. and affect normal/bright     LYMPHATICS: No cervical adenopathy    DIAGNOSTICS:   EKG: Not indicated due to non-vascular surgery and low risk of event (age <65 and without cardiac risk factors)    ReceCRnt Labs   none                  IMPRESSION:   Reason for surgery/procedure: Right meniscal knee tear and left rotator cuff tear  Diagnosis/reason for consult: Preoperative clearance for right meniscal knee tear surgery and if within the 30 day time frame clearance for his rotator cuff repair also.     The proposed surgical procedure is considered INTERMEDIATE risk.    REVISED CARDIAC RISK INDEX  The patient has the following serious cardiovascular risks for perioperative complications such as (MI, PE, VFib and 3  AV Block):  No serious cardiac risks  INTERPRETATION: 0 risks: Class I (very low risk - 0.4% complication rate)    The  patient has the following additional risks for perioperative complications:  No identified additional risks      ICD-10-CM    1. Preop general physical exam Z01.818    2. Old tear of medial meniscus of right knee, unspecified tear type M23.203        RECOMMENDATIONS:       Obstructive Sleep Apnea (or suspected sleep apnea)  Patient has been diagnosed with sleep apnea and has been at home CPAP machine, however he h per as never used it.  I advised him that recovery and sleep overall would be improved with the use of this machine.  He acknowledges this, but is not willing to use the machine, as he feels it is obstructive/suffocating.  --Patient is to not take scheduled medications on the day of surgery     APPROVAL GIVEN to proceed with proposed procedure, without further diagnostic evaluation     This document serves as a record of the services and decisions personally performed and made by Francis Adhikari MD.  It was created on his behalf by Shirin Capellan, a trained medical student and scribe.  The creation of this record is based on the provider's personal observations and the statements of the patient.     Shirin Capellan, MPH, MS3  February 13, 2020    I agree with the PFSH and ROS as completed by the MS.  The remainder of the encounter was performed by me and scribed by the MS.  The scribed note accurately reflects my personal services and the decisions made by me.     Electronically signed by:  Francis Lane M.D.  2/13/2020     Copy of this evaluation report is provided to requesting physician.    [facility] Preop Guidelines    Revised Cardiac Risk Index

## 2020-02-13 NOTE — NURSING NOTE
Chief Complaint   Patient presents with     Pre-Op Exam     2/24/2020 RIGHT ARTHROSCOPY, KNEE, WITH MEDIAL MENISCECTOMY Dr. Edda HOFFMAN/MA

## 2020-02-17 RX ORDER — TIZANIDINE 2 MG/1
2 TABLET ORAL PRN
COMMUNITY
End: 2020-07-30

## 2020-02-17 RX ORDER — OXYCODONE AND ACETAMINOPHEN 5; 325 MG/1; MG/1
1 TABLET ORAL EVERY 6 HOURS PRN
COMMUNITY
End: 2022-04-18

## 2020-02-18 ENCOUNTER — HOSPITAL ENCOUNTER (OUTPATIENT)
Facility: CLINIC | Age: 60
Discharge: HOME | End: 2020-02-18
Attending: SURGERY | Admitting: SURGERY
Payer: MEDICARE

## 2020-02-18 ENCOUNTER — ANESTHESIA (OUTPATIENT)
Dept: GASTROENTEROLOGY | Facility: CLINIC | Age: 60
End: 2020-02-18
Payer: MEDICARE

## 2020-02-18 ENCOUNTER — ANESTHESIA EVENT (OUTPATIENT)
Dept: GASTROENTEROLOGY | Facility: CLINIC | Age: 60
End: 2020-02-18
Payer: MEDICARE

## 2020-02-18 VITALS
TEMPERATURE: 98.3 F | SYSTOLIC BLOOD PRESSURE: 99 MMHG | OXYGEN SATURATION: 97 % | DIASTOLIC BLOOD PRESSURE: 62 MMHG | RESPIRATION RATE: 18 BRPM

## 2020-02-18 LAB — COLONOSCOPY: NORMAL

## 2020-02-18 PROCEDURE — 25800030 ZZH RX IP 258 OP 636: Performed by: SURGERY

## 2020-02-18 PROCEDURE — 25000128 H RX IP 250 OP 636: Performed by: NURSE ANESTHETIST, CERTIFIED REGISTERED

## 2020-02-18 PROCEDURE — 37000009 ZZH ANESTHESIA TECHNICAL FEE, EACH ADDTL 15 MIN: Performed by: SURGERY

## 2020-02-18 PROCEDURE — 45378 DIAGNOSTIC COLONOSCOPY: CPT | Performed by: SURGERY

## 2020-02-18 PROCEDURE — 25000125 ZZHC RX 250: Performed by: NURSE ANESTHETIST, CERTIFIED REGISTERED

## 2020-02-18 PROCEDURE — 37000008 ZZH ANESTHESIA TECHNICAL FEE, 1ST 30 MIN: Performed by: SURGERY

## 2020-02-18 PROCEDURE — 25000132 ZZH RX MED GY IP 250 OP 250 PS 637: Mod: GY | Performed by: SURGERY

## 2020-02-18 PROCEDURE — G0121 COLON CA SCRN NOT HI RSK IND: HCPCS | Performed by: SURGERY

## 2020-02-18 PROCEDURE — 25000125 ZZHC RX 250: Performed by: SURGERY

## 2020-02-18 RX ORDER — PROPOFOL 10 MG/ML
INJECTION, EMULSION INTRAVENOUS CONTINUOUS PRN
Status: DISCONTINUED | OUTPATIENT
Start: 2020-02-18 | End: 2020-02-18

## 2020-02-18 RX ORDER — LIDOCAINE HYDROCHLORIDE 20 MG/ML
INJECTION, SOLUTION INFILTRATION; PERINEURAL PRN
Status: DISCONTINUED | OUTPATIENT
Start: 2020-02-18 | End: 2020-02-18

## 2020-02-18 RX ORDER — NALOXONE HYDROCHLORIDE 0.4 MG/ML
.1-.4 INJECTION, SOLUTION INTRAMUSCULAR; INTRAVENOUS; SUBCUTANEOUS
Status: CANCELLED | OUTPATIENT
Start: 2020-02-18 | End: 2020-02-19

## 2020-02-18 RX ORDER — ONDANSETRON 4 MG/1
4 TABLET, ORALLY DISINTEGRATING ORAL EVERY 6 HOURS PRN
Status: CANCELLED | OUTPATIENT
Start: 2020-02-18

## 2020-02-18 RX ORDER — ONDANSETRON 2 MG/ML
4 INJECTION INTRAMUSCULAR; INTRAVENOUS EVERY 6 HOURS PRN
Status: CANCELLED | OUTPATIENT
Start: 2020-02-18

## 2020-02-18 RX ORDER — SODIUM CHLORIDE, SODIUM LACTATE, POTASSIUM CHLORIDE, CALCIUM CHLORIDE 600; 310; 30; 20 MG/100ML; MG/100ML; MG/100ML; MG/100ML
INJECTION, SOLUTION INTRAVENOUS CONTINUOUS
Status: DISCONTINUED | OUTPATIENT
Start: 2020-02-18 | End: 2020-02-18 | Stop reason: HOSPADM

## 2020-02-18 RX ORDER — LIDOCAINE 40 MG/G
CREAM TOPICAL
Status: DISCONTINUED | OUTPATIENT
Start: 2020-02-18 | End: 2020-02-18 | Stop reason: HOSPADM

## 2020-02-18 RX ORDER — FLUMAZENIL 0.1 MG/ML
0.2 INJECTION, SOLUTION INTRAVENOUS
Status: CANCELLED | OUTPATIENT
Start: 2020-02-18 | End: 2020-02-18

## 2020-02-18 RX ORDER — PROPOFOL 10 MG/ML
INJECTION, EMULSION INTRAVENOUS PRN
Status: DISCONTINUED | OUTPATIENT
Start: 2020-02-18 | End: 2020-02-18

## 2020-02-18 RX ORDER — ONDANSETRON 2 MG/ML
4 INJECTION INTRAMUSCULAR; INTRAVENOUS
Status: DISCONTINUED | OUTPATIENT
Start: 2020-02-18 | End: 2020-02-18 | Stop reason: HOSPADM

## 2020-02-18 RX ADMIN — SODIUM CHLORIDE, POTASSIUM CHLORIDE, SODIUM LACTATE AND CALCIUM CHLORIDE: 600; 310; 30; 20 INJECTION, SOLUTION INTRAVENOUS at 11:25

## 2020-02-18 RX ADMIN — LIDOCAINE HYDROCHLORIDE 40 MG: 20 INJECTION, SOLUTION INFILTRATION; PERINEURAL at 11:42

## 2020-02-18 RX ADMIN — LIDOCAINE HYDROCHLORIDE 1 ML: 10 INJECTION, SOLUTION EPIDURAL; INFILTRATION; INTRACAUDAL; PERINEURAL at 11:12

## 2020-02-18 RX ADMIN — PROPOFOL 50 MG: 10 INJECTION, EMULSION INTRAVENOUS at 11:42

## 2020-02-18 RX ADMIN — PROPOFOL 150 MCG/KG/MIN: 10 INJECTION, EMULSION INTRAVENOUS at 11:42

## 2020-02-18 ASSESSMENT — LIFESTYLE VARIABLES: TOBACCO_USE: 1

## 2020-02-18 NOTE — ANESTHESIA POSTPROCEDURE EVALUATION
Patient: Gomez Gutierrez    Procedure(s):  Colonoscopy    Diagnosis:Special screening for malignant neoplasms, colon [Z12.11]  Diagnosis Additional Information: No value filed.    Anesthesia Type:  MAC    Note:  Anesthesia Post Evaluation    Patient location during evaluation: Phase 2 and Bedside  Patient participation: Able to fully participate in evaluation  Level of consciousness: awake and alert  Pain management: satisfactory to patient  Airway patency: patent  Cardiovascular status: stable  Respiratory status: spontaneous ventilation and room air  Hydration status: stable  PONV: none     Anesthetic complications: None    Comments: Appear to tolerate MAC with IV sedation well without anesthesia related problems / complications noted.  Pain level satisfactory per patient. No N  /  V.  No complaints per patient.  Will follow as needed.        Last vitals:  Vitals:    02/18/20 1105   BP: 124/80   Resp: 16   Temp: 98.3  F (36.8  C)   SpO2: 96%         Electronically Signed By: TIFFANY Brown CRNA  February 18, 2020  12:11 PM

## 2020-02-18 NOTE — DISCHARGE INSTRUCTIONS
[facility]    Home Care Following Endoscopy          Activity:    You have just undergone an endoscopic procedure usually performed with conscious sedation.  Do not work or operate machinery (including a car) for at least 12 hours.      I encourage you to walk and attempt to pass this air as soon as possible.    Diet:    Return to the diet you were on before your procedure but eat lightly for the first 12-24 hours.    Drink plenty of water.    Resume any regular medications unless otherwise advised by your physician.  Please begin any new medication prescribed as a result of your procedure as directed by your physician.     If you had any biopsy or polyp removed please refrain from aspirin or aspirin products for 2 days.  If on Coumadin please restart as instructed by your physician.   Pain:    You may take Tylenol as needed for pain.  Expected Recovery:    You can expect some mild abdominal fullness and/or discomfort due to the air used to inflate your intestinal tract. It is also normal to have a mild sore throat after upper endoscopy.    Call Your Physician if You Have:    After Upper Endoscopy:  o Shoulder, back or chest pain.  o Difficulty breathing or swallowing.  o Vomiting blood.    After Colonoscopy:  o Worsening persisting abdominal pain which is worse with activity.  o Fevers (>101 degrees F), chills or shakes.  o Passage of continued blood with bowel movements.   Any questions or concerns about your recovery, please call [phone] or after hours [phone] Nurse Advice Line.    Follow-up Care:    You should receive a call or letter with your results within 1 week. Please call if you have not received a notification of your results.  If asked to return to clinic please make an appointment 1 week after your procedure.  Call [phone].

## 2020-02-18 NOTE — ANESTHESIA CARE TRANSFER NOTE
Patient: Gomez Gutierrez    Procedure(s):  Colonoscopy    Diagnosis: Special screening for malignant neoplasms, colon [Z12.11]  Diagnosis Additional Information: No value filed.    Anesthesia Type:   MAC     Note:  Airway :Room Air  Patient transferred to:Phase II  Handoff Report: Identifed the Patient, Identified the Reponsible Provider, Reviewed the pertinent medical history, Discussed the surgical course, Reviewed Intra-OP anesthesia mangement and issues during anesthesia, Set expectations for post-procedure period and Allowed opportunity for questions and acknowledgement of understanding      Vitals: (Last set prior to Anesthesia Care Transfer)    CRNA VITALS  2/18/2020 1135 - 2/18/2020 1209      2/18/2020             SpO2:  97 %    Resp Rate (observed):  21                Electronically Signed By: TIFFANY Brown CRNA  February 18, 2020  12:09 PM

## 2020-02-24 ENCOUNTER — ANESTHESIA (OUTPATIENT)
Dept: SURGERY | Facility: CLINIC | Age: 60
End: 2020-02-24
Payer: MEDICARE

## 2020-02-24 ENCOUNTER — ANESTHESIA EVENT (OUTPATIENT)
Dept: SURGERY | Facility: CLINIC | Age: 60
End: 2020-02-24
Payer: MEDICARE

## 2020-02-24 ENCOUNTER — HOSPITAL ENCOUNTER (OUTPATIENT)
Facility: CLINIC | Age: 60
Discharge: HOME | End: 2020-02-24
Attending: ORTHOPAEDIC SURGERY | Admitting: ORTHOPAEDIC SURGERY
Payer: MEDICARE

## 2020-02-24 VITALS
DIASTOLIC BLOOD PRESSURE: 85 MMHG | SYSTOLIC BLOOD PRESSURE: 144 MMHG | OXYGEN SATURATION: 97 % | HEART RATE: 56 BPM | TEMPERATURE: 97.7 F | RESPIRATION RATE: 16 BRPM

## 2020-02-24 DIAGNOSIS — S83.241D ACUTE MEDIAL MENISCUS TEAR OF RIGHT KNEE, SUBSEQUENT ENCOUNTER: ICD-10-CM

## 2020-02-24 PROCEDURE — 25000128 H RX IP 250 OP 636: Performed by: NURSE ANESTHETIST, CERTIFIED REGISTERED

## 2020-02-24 PROCEDURE — 25000125 ZZHC RX 250: Performed by: NURSE ANESTHETIST, CERTIFIED REGISTERED

## 2020-02-24 PROCEDURE — 40000306 ZZH STATISTIC PRE PROC ASSESS II: Performed by: ORTHOPAEDIC SURGERY

## 2020-02-24 PROCEDURE — 25000566 ZZH SEVOFLURANE, EA 15 MIN: Performed by: ORTHOPAEDIC SURGERY

## 2020-02-24 PROCEDURE — 71000027 ZZH RECOVERY PHASE 2 EACH 15 MINS: Performed by: ORTHOPAEDIC SURGERY

## 2020-02-24 PROCEDURE — 29881 ARTHRS KNE SRG MNISECTMY M/L: CPT | Mod: RT | Performed by: ORTHOPAEDIC SURGERY

## 2020-02-24 PROCEDURE — 37000008 ZZH ANESTHESIA TECHNICAL FEE, 1ST 30 MIN: Performed by: ORTHOPAEDIC SURGERY

## 2020-02-24 PROCEDURE — 27210794 ZZH OR GENERAL SUPPLY STERILE: Performed by: ORTHOPAEDIC SURGERY

## 2020-02-24 PROCEDURE — 36000058 ZZH SURGERY LEVEL 3 EA 15 ADDTL MIN: Performed by: ORTHOPAEDIC SURGERY

## 2020-02-24 PROCEDURE — 25800030 ZZH RX IP 258 OP 636: Performed by: NURSE ANESTHETIST, CERTIFIED REGISTERED

## 2020-02-24 PROCEDURE — 71000014 ZZH RECOVERY PHASE 1 LEVEL 2 FIRST HR: Performed by: ORTHOPAEDIC SURGERY

## 2020-02-24 PROCEDURE — 25000128 H RX IP 250 OP 636: Performed by: PHYSICIAN ASSISTANT

## 2020-02-24 PROCEDURE — 25000132 ZZH RX MED GY IP 250 OP 250 PS 637: Mod: GY | Performed by: NURSE ANESTHETIST, CERTIFIED REGISTERED

## 2020-02-24 PROCEDURE — 36000056 ZZH SURGERY LEVEL 3 1ST 30 MIN: Performed by: ORTHOPAEDIC SURGERY

## 2020-02-24 PROCEDURE — 37000009 ZZH ANESTHESIA TECHNICAL FEE, EACH ADDTL 15 MIN: Performed by: ORTHOPAEDIC SURGERY

## 2020-02-24 RX ORDER — LIDOCAINE HYDROCHLORIDE 20 MG/ML
INJECTION, SOLUTION INFILTRATION; PERINEURAL PRN
Status: DISCONTINUED | OUTPATIENT
Start: 2020-02-24 | End: 2020-02-24

## 2020-02-24 RX ORDER — GABAPENTIN 300 MG/1
300 CAPSULE ORAL ONCE
Status: COMPLETED | OUTPATIENT
Start: 2020-02-24 | End: 2020-02-24

## 2020-02-24 RX ORDER — HYDROCODONE BITARTRATE AND ACETAMINOPHEN 5; 325 MG/1; MG/1
1 TABLET ORAL
Status: DISCONTINUED | OUTPATIENT
Start: 2020-02-24 | End: 2020-02-24 | Stop reason: HOSPADM

## 2020-02-24 RX ORDER — NALOXONE HYDROCHLORIDE 0.4 MG/ML
.1-.4 INJECTION, SOLUTION INTRAMUSCULAR; INTRAVENOUS; SUBCUTANEOUS
Status: DISCONTINUED | OUTPATIENT
Start: 2020-02-24 | End: 2020-02-24 | Stop reason: HOSPADM

## 2020-02-24 RX ORDER — ACETAMINOPHEN 325 MG/1
975 TABLET ORAL ONCE
Status: COMPLETED | OUTPATIENT
Start: 2020-02-24 | End: 2020-02-24

## 2020-02-24 RX ORDER — CEFAZOLIN SODIUM 2 G/100ML
2 INJECTION, SOLUTION INTRAVENOUS
Status: COMPLETED | OUTPATIENT
Start: 2020-02-24 | End: 2020-02-24

## 2020-02-24 RX ORDER — OXYCODONE HYDROCHLORIDE 5 MG/1
5 TABLET ORAL EVERY 4 HOURS PRN
Status: DISCONTINUED | OUTPATIENT
Start: 2020-02-24 | End: 2020-02-24 | Stop reason: HOSPADM

## 2020-02-24 RX ORDER — HYDROCODONE BITARTRATE AND ACETAMINOPHEN 5; 325 MG/1; MG/1
1-2 TABLET ORAL EVERY 6 HOURS PRN
Qty: 20 TABLET | Refills: 0 | Status: SHIPPED | OUTPATIENT
Start: 2020-02-24 | End: 2020-07-30

## 2020-02-24 RX ORDER — SODIUM CHLORIDE, SODIUM LACTATE, POTASSIUM CHLORIDE, CALCIUM CHLORIDE 600; 310; 30; 20 MG/100ML; MG/100ML; MG/100ML; MG/100ML
INJECTION, SOLUTION INTRAVENOUS CONTINUOUS
Status: DISCONTINUED | OUTPATIENT
Start: 2020-02-24 | End: 2020-02-24 | Stop reason: HOSPADM

## 2020-02-24 RX ORDER — PROPOFOL 10 MG/ML
INJECTION, EMULSION INTRAVENOUS PRN
Status: DISCONTINUED | OUTPATIENT
Start: 2020-02-24 | End: 2020-02-24

## 2020-02-24 RX ORDER — DIMENHYDRINATE 50 MG/ML
25 INJECTION, SOLUTION INTRAMUSCULAR; INTRAVENOUS
Status: DISCONTINUED | OUTPATIENT
Start: 2020-02-24 | End: 2020-02-24 | Stop reason: HOSPADM

## 2020-02-24 RX ORDER — DEXAMETHASONE SODIUM PHOSPHATE 10 MG/ML
INJECTION, SOLUTION INTRAMUSCULAR; INTRAVENOUS PRN
Status: DISCONTINUED | OUTPATIENT
Start: 2020-02-24 | End: 2020-02-24

## 2020-02-24 RX ORDER — HYDROMORPHONE HYDROCHLORIDE 1 MG/ML
.3-.5 INJECTION, SOLUTION INTRAMUSCULAR; INTRAVENOUS; SUBCUTANEOUS EVERY 10 MIN PRN
Status: DISCONTINUED | OUTPATIENT
Start: 2020-02-24 | End: 2020-02-24 | Stop reason: HOSPADM

## 2020-02-24 RX ORDER — ONDANSETRON 2 MG/ML
INJECTION INTRAMUSCULAR; INTRAVENOUS PRN
Status: DISCONTINUED | OUTPATIENT
Start: 2020-02-24 | End: 2020-02-24

## 2020-02-24 RX ORDER — ONDANSETRON 2 MG/ML
4 INJECTION INTRAMUSCULAR; INTRAVENOUS EVERY 30 MIN PRN
Status: DISCONTINUED | OUTPATIENT
Start: 2020-02-24 | End: 2020-02-24 | Stop reason: HOSPADM

## 2020-02-24 RX ORDER — ONDANSETRON 4 MG/1
4 TABLET, ORALLY DISINTEGRATING ORAL EVERY 30 MIN PRN
Status: DISCONTINUED | OUTPATIENT
Start: 2020-02-24 | End: 2020-02-24 | Stop reason: HOSPADM

## 2020-02-24 RX ORDER — KETOROLAC TROMETHAMINE 30 MG/ML
INJECTION, SOLUTION INTRAMUSCULAR; INTRAVENOUS PRN
Status: DISCONTINUED | OUTPATIENT
Start: 2020-02-24 | End: 2020-02-24

## 2020-02-24 RX ORDER — LIDOCAINE 40 MG/G
CREAM TOPICAL
Status: DISCONTINUED | OUTPATIENT
Start: 2020-02-24 | End: 2020-02-24 | Stop reason: HOSPADM

## 2020-02-24 RX ORDER — FENTANYL CITRATE 50 UG/ML
INJECTION, SOLUTION INTRAMUSCULAR; INTRAVENOUS PRN
Status: DISCONTINUED | OUTPATIENT
Start: 2020-02-24 | End: 2020-02-24

## 2020-02-24 RX ORDER — CEFAZOLIN SODIUM 1 G/3ML
1 INJECTION, POWDER, FOR SOLUTION INTRAMUSCULAR; INTRAVENOUS SEE ADMIN INSTRUCTIONS
Status: DISCONTINUED | OUTPATIENT
Start: 2020-02-24 | End: 2020-02-24 | Stop reason: HOSPADM

## 2020-02-24 RX ORDER — FENTANYL CITRATE 50 UG/ML
25-50 INJECTION, SOLUTION INTRAMUSCULAR; INTRAVENOUS
Status: DISCONTINUED | OUTPATIENT
Start: 2020-02-24 | End: 2020-02-24 | Stop reason: HOSPADM

## 2020-02-24 RX ADMIN — ACETAMINOPHEN 975 MG: 325 TABLET, FILM COATED ORAL at 09:48

## 2020-02-24 RX ADMIN — DEXAMETHASONE SODIUM PHOSPHATE 4 MG: 10 INJECTION, SOLUTION INTRAMUSCULAR; INTRAVENOUS at 10:24

## 2020-02-24 RX ADMIN — LIDOCAINE HYDROCHLORIDE 0.1 ML: 10 INJECTION, SOLUTION EPIDURAL; INFILTRATION; INTRACAUDAL; PERINEURAL at 10:11

## 2020-02-24 RX ADMIN — KETOROLAC TROMETHAMINE 15 MG: 30 INJECTION, SOLUTION INTRAMUSCULAR at 10:58

## 2020-02-24 RX ADMIN — SODIUM CHLORIDE, POTASSIUM CHLORIDE, SODIUM LACTATE AND CALCIUM CHLORIDE: 600; 310; 30; 20 INJECTION, SOLUTION INTRAVENOUS at 10:11

## 2020-02-24 RX ADMIN — FENTANYL CITRATE 50 MCG: 50 INJECTION, SOLUTION INTRAMUSCULAR; INTRAVENOUS at 10:18

## 2020-02-24 RX ADMIN — LIDOCAINE HYDROCHLORIDE 100 MG: 20 INJECTION, SOLUTION INFILTRATION; PERINEURAL at 10:18

## 2020-02-24 RX ADMIN — GABAPENTIN 300 MG: 300 CAPSULE ORAL at 09:48

## 2020-02-24 RX ADMIN — FENTANYL CITRATE 50 MCG: 50 INJECTION, SOLUTION INTRAMUSCULAR; INTRAVENOUS at 10:30

## 2020-02-24 RX ADMIN — HYDROMORPHONE HYDROCHLORIDE 0.5 MG: 1 INJECTION, SOLUTION INTRAMUSCULAR; INTRAVENOUS; SUBCUTANEOUS at 10:45

## 2020-02-24 RX ADMIN — ONDANSETRON 4 MG: 2 INJECTION INTRAMUSCULAR; INTRAVENOUS at 10:32

## 2020-02-24 RX ADMIN — CEFAZOLIN SODIUM 2 G: 2 INJECTION, SOLUTION INTRAVENOUS at 10:22

## 2020-02-24 RX ADMIN — MIDAZOLAM 2 MG: 1 INJECTION INTRAMUSCULAR; INTRAVENOUS at 10:11

## 2020-02-24 RX ADMIN — OXYCODONE HYDROCHLORIDE 5 MG: 5 TABLET ORAL at 12:09

## 2020-02-24 RX ADMIN — HYDROMORPHONE HYDROCHLORIDE 0.5 MG: 1 INJECTION, SOLUTION INTRAMUSCULAR; INTRAVENOUS; SUBCUTANEOUS at 11:19

## 2020-02-24 RX ADMIN — PROPOFOL 200 MG: 10 INJECTION, EMULSION INTRAVENOUS at 10:18

## 2020-02-24 ASSESSMENT — LIFESTYLE VARIABLES: TOBACCO_USE: 1

## 2020-02-24 NOTE — BRIEF OP NOTE
[facility]    Brief Operative Note    Pre-operative diagnosis: Acute medial meniscus tear of right knee, subsequent encounter [L48.069G]  Post-operative diagnosis Same as pre-operative diagnosis    Procedure: Procedure(s):  ARTHROSCOPY, KNEE, WITH MEDIAL MENISCECTOMY right  Surgeon: Surgeon(s) and Role:     * Eliot Bañuelos MD - Primary  Anesthesia: General   Estimated blood loss: Minimal  Drains: None  Specimens: * No specimens in log *  Findings:   None.  Complications: None.  Implants: * No implants in log *

## 2020-02-24 NOTE — ANESTHESIA PREPROCEDURE EVALUATION
Anesthesia Pre-Procedure Evaluation    Patient: Gomez Gutierrez   MRN: [id number] : 1960          Preoperative Diagnosis: Acute medial meniscus tear of right knee, subsequent encounter [L45.986U]    Procedure(s):  ARTHROSCOPY, KNEE, WITH MEDIAL MENISCECTOMY right    Past Medical History:   Diagnosis Date     Arthritis      Lumbago      Major depression in complete remission (H) 2012     Severe Depression [296.33] 2009     Sleep apnea      Tobacco use disorder      Past Surgical History:   Procedure Laterality Date     BACK SURGERY  2015    cleaned out bone spurs     C ECHO HEART XTHORACIC,COMPLETE, W/O DOPPLER  07    normal cardiac stress echo test     C ESTEBNA W/O FACETEC FORAMOT/[facility]  VRT SEG, LUMBAR      L4-5     C REPAIR LUMBAR HERNIA      L5-4 L5-S1     COLONOSCOPY N/A 2020    Procedure: Colonoscopy;  Surgeon: Juan Beltrán DO;  Location: [facility]     HC COLONOSCOPY W BIOPSY  09     HC REMOVE TONSILS/ADENOIDS,<13 Y/O      T & A <11y.o.     LAMINECT/DISCECTOMY, LUMBAR  10/19/06    L3-4     LAMINECT/DISCECTOMY, LUMBAR  11/10    left sided L4-5     REMOVAL OF SPERM DUCT(S)      Vasectomy     ROTATOR CUFF REPAIR RT/LT  3/12    right sided       Anesthesia Evaluation     . Pt has had prior anesthetic. Type: General and MAC    No history of anesthetic complications          ROS/MED HX    ENT/Pulmonary:     (+)sleep apnea, tobacco use, Current use 1ppd for 45 years packs/day  doesn't use CPAP , . .    Neurologic:  - neg neurologic ROS     Cardiovascular:  - neg cardiovascular ROS   (+) ----. : . . . :. . Previous cardiac testing date:results:Stress Testdate: results:Normal stress testECG reviewed date: results:SR date: results:          METS/Exercise Tolerance:  >4 METS   Hematologic:  - neg hematologic  ROS       Musculoskeletal: Comment: Foot drop  (+)  other musculoskeletal- chronic low back pain with siatica, foot drop ,DDD lumbar. lumbar radiculopathy  "     GI/Hepatic:  - neg GI/hepatic ROS       Renal/Genitourinary:  - ROS Renal section negative   (+) Pt has no history of transplant,       Endo:  - neg endo ROS       Psychiatric:     (+) psychiatric history depression      Infectious Disease:  - neg infectious disease ROS       Malignancy:      - no malignancy   Other:    (+) No chance of pregnancy C-spine cleared: N/A, H/O Chronic Pain,                        Physical Exam  Normal systems: cardiovascular, pulmonary and dental    Airway   Mallampati: II  TM distance: >3 FB  Neck ROM: full    Dental     Cardiovascular   Rhythm and rate: regular and normal      Pulmonary    breath sounds clear to auscultation            Lab Results   Component Value Date    WBC 8.5 01/10/2007    HGB 15.9 01/10/2007    HCT 47.4 01/10/2007     01/10/2007    SED 8 01/10/2007     12/10/2018    POTASSIUM 4.5 12/10/2018    CHLORIDE 106 12/10/2018    CO2 25 12/10/2018    BUN 20 12/10/2018    CR 0.85 12/10/2018     (H) 12/10/2018    ARLETH 9.0 12/10/2018    PHOS 2.9 10/17/2006    MAG 2.1 10/19/2006    ALBUMIN 4.2 10/16/2006    PROTTOTAL 7.0 10/16/2006    ALT 28 10/16/2006    AST 38 10/16/2006    ALKPHOS 82 10/16/2006    BILITOTAL 1.3 10/16/2006    PTT 31 10/17/2006    INR 1.07 10/17/2006    TSH 1.65 11/03/2009    T4 6.9 03/25/2004       Preop Vitals  BP Readings from Last 3 Encounters:   02/18/20 99/62   02/13/20 134/68   01/30/20 138/70    Pulse Readings from Last 3 Encounters:   02/13/20 76   01/16/20 84   10/01/19 80      Resp Readings from Last 3 Encounters:   02/18/20 18   02/13/20 18   01/16/20 18    SpO2 Readings from Last 3 Encounters:   02/18/20 97%   02/13/20 98%   01/16/20 94%      Temp Readings from Last 1 Encounters:   02/18/20 98.3  F (36.8  C) (Oral)    Ht Readings from Last 1 Encounters:   01/30/20 1.979 m (6' 5.9\")      Wt Readings from Last 1 Encounters:   02/13/20 78.9 kg (174 lb)    Estimated body mass index is 20.16 kg/m  as calculated from the " "following:    Height as of 1/30/20: 1.979 m (6' 5.9\").    Weight as of 2/13/20: 78.9 kg (174 lb).       Anesthesia Plan      History & Physical Review  History and physical reviewed and following examination; no interval change.    ASA Status:  2 .    NPO Status:  > 8 hours    Plan for General and LMA with Intravenous and Propofol induction. Maintenance will be Inhalation and Balanced.    PONV prophylaxis:  Ondansetron (or other 5HT-3) and Dexamethasone or Solumedrol       Postoperative Care  Postoperative pain management:  Oral pain medications, IV analgesics and Multi-modal analgesia.      Consents  Anesthetic plan, risks, benefits and alternatives discussed with:  Patient.  Use of blood products discussed: No .   .                 TIFFANY Newton CRNA  "

## 2020-02-24 NOTE — ANESTHESIA CARE TRANSFER NOTE
Patient: Gomez Gutierrez    Procedure(s):  ARTHROSCOPY, KNEE, WITH MEDIAL MENISCECTOMY right    Diagnosis: Acute medial meniscus tear of right knee, subsequent encounter [S85.965G]  Diagnosis Additional Information: No value filed.    Anesthesia Type:   General, LMA     Note:  Airway :Face Mask  Patient transferred to:PACU  Handoff Report: Identifed the Patient, Identified the Reponsible Provider, Reviewed the pertinent medical history, Discussed the surgical course, Reviewed Intra-OP anesthesia mangement and issues during anesthesia, Set expectations for post-procedure period and Allowed opportunity for questions and acknowledgement of understanding      Vitals: (Last set prior to Anesthesia Care Transfer)    CRNA VITALS  2/24/2020 1035 - 2/24/2020 1110      2/24/2020             Pulse:  55    SpO2:  99 %                Electronically Signed By: TIFFANY Newton CRNA  February 24, 2020  11:10 AM

## 2020-02-24 NOTE — ANESTHESIA POSTPROCEDURE EVALUATION
Patient: Gomez Gutierrez    Procedure(s):  ARTHROSCOPY, KNEE, WITH MEDIAL MENISCECTOMY right    Diagnosis:Acute medial meniscus tear of right knee, subsequent encounter [S89.360N]  Diagnosis Additional Information: No value filed.    Anesthesia Type:  General, LMA    Note:  Anesthesia Post Evaluation    Patient location during evaluation: Phase 2  Patient participation: Able to fully participate in evaluation  Level of consciousness: awake and alert  Pain management: adequate  Airway patency: patent  Cardiovascular status: acceptable and stable  Respiratory status: acceptable and room air  Hydration status: acceptable  PONV: none     Anesthetic complications: None    Comments:  Patient was happy with the anesthesia care received and no anesthesia related complications were noted.  I will follow up with the patient again if it is needed.        Last vitals:  Vitals:    02/24/20 1125 02/24/20 1130 02/24/20 1135   BP: (!) 149/96 (!) 144/83 (!) 145/86   Pulse: 61 60 57   Resp: 15 13 12   Temp:      SpO2: 99% 98% 97%         Electronically Signed By: TIFFANY Newton CRNA  February 24, 2020  11:41 AM

## 2020-02-24 NOTE — OP NOTE
PREOPERATIVE DIAGNOSIS: Acute medial meniscus tear of right knee, subsequent encounter [H55.540V]   POSTOPERATIVE DIAGNOSIS: Same  PROCEDURE: Procedure(s):  ARTHROSCOPY, KNEE, WITH MEDIAL MENISCECTOMY right   SURGEON: Eliot Bañuelos MD   ASSISTANT: None  ANESTHESIA: General  COMPLICATIONS: None.   ESTIMATED BLOOD LOSS: Minimal.   COUNTS: Correct.   DISPOSITION: To PACU in stable condition.   GROSS FINDINGS:    Patellofemoral Compartment:Majority, Grade 4 chondromalacia   Medial Compartment:25 percentGrade 3 chondromalacia, Meniscus tear of the posterior horn    Intercondylar notch: ACL is intact   Lateral Compartment:10 percentGrade 2 chondromalacia,No tear  NOTE/INDICATIONS: Mr. Gutierrez is a 59 year old year-old  who presents for the above. He was evaluated in my office. He received a preoperative MRI which confirmed our clinical diagnosis of a medial meniscus tear. Risks, benefits, alternatives, complications, limitations, and anticipated postop course were discussed at length. Risks included but not limited to infection, bleeding, blood clots, scar, scar tenderness, stiffness, skin problems with healing, and failure to relieve all symptoms were discussed at length. Also discussed surgery done under general anesthesia include risks of aspiration, strokes, heart attacks, and deaths. Nerve block was also discussed. he agreed and would like to proceed with surgery.  All questions were answered.     DETAILS OF PROCEDURE: he was met in the preop care unit. Again, informed consent was verified. The appropriate extremity was marked. He was wheeled back to operative suite  at [facility]. Mr. Gutierrez was transferred off the cart to the OR table without incident. he was placed under general anesthesia.  All bony prominences were padded. Next, his right lower extremity was prepped and draped in a normal manner. After a time-out had been performed, his right knee was injected with local anesthetic. This was done  under aseptic conditions.  A superior medial portal was made and the outflow was introduced.  An anterior lateral portal was then made and the arthroscope was then introduced into the patellofemoral joint. A diagnostic arthroscopy with the above gross findings in each and the compartments was then performed. Attention was turned to the medial compartment. Under direct visualization, the medial portal was established. The medial meniscus was noted to be torn.  Utilizing a combination of a biter and a shaver from both the medial and lateral portals, the medial meniscus was trimmed  back to a smooth and stable margin. This was stable to probing.  Evaluation of the articular surfaces showed Grade 4 chondromalacia in the  Patella and trochlea.   the knee was copiously irrigated and the shaver was used to remove any remaining debris from the outflow.  This was then suctioned dry. Instruments were removed. The knee was injected with local anesthetic.  A sterile dressing was applied, followed by an Ace wrap from his foot to thigh. he tolerated the procedure well. When deemed safe, per Anesthesia, the patient was transferred to the PACU in stable condition, to be discharged home with pain medications, weightbearing as tolerated. A followup has been scheduled in the office.     Eliot Bañuelos MD

## 2020-02-24 NOTE — DISCHARGE INSTRUCTIONS
General Knee Arthroscopy Discharge Instructions                                       [phone]  Bone and Joint Service Line for issues or concerns    General Care:  After surgery you may feel tired/sleepy. This is normal. Please have someone stay with you for 24 hours after surgery. You should avoid driving for 1-2 days after surgery, as your reaction time may be slow. You should not drive at all if you have had surgery on your arms, right leg and/or are taking narcotic pain medications until released by your doctor. If you have any question along the way please contact the office. If you feel it is an issue cannot wait for normal office hours, contact the on-call physician.  Elevate your leg with a couple of pillows placed under your ankle/calf area. Do this for the first couple days frequently.     Bandages:   Change your bandage after the first 72 hours. You may use Band-Aids or sterile gauze with a small amount of tape. It s normal to have some blood-tinged fluid on your bandages, this will usually continue for the first day or two. Keep the area clean and dry. Do not apply any ointments. Use the ACE wrap from your foot to thigh until you are seen at your follow up.     Bathing:  Do not submerge your incision in water such as a bath or pool. It is ok to shower after removing your initial bandage after the first 3 days from surgery. Avoid any excessively hot showers, baths, or hot tubes after surgery.     Follow up:  Your follow up appointment should already be scheduled. If its not, please call the office to verify an appointment 10 days after surgery.     Diet:  Start with non-alcoholic liquids at first, particularly water or sports drinks after surgery. Progress to bland foods such as crackers and bread and finally to your normal diet if you have no problems.     Pain control:  Take your pain medications as prescribed. These medications may make you sleepy. Do not drive, operate equipment, or drink alcohol  when taking these.  You may take Tylenol (Generic name is acetaminophen) or NSAIDs (Motrin, Ibuprofen, Aleve, Naproxen) as directed on the bottle for additional relief or in place of the prescribed pain medications as your pain gets better. If the medications cause a reaction such as nausea or skin rash, stop taking them and contact your doctor. Please plan accordingly, pain medications will not be re-filled on the weekends or at night. Call the office during the day if you need more medications.    Crutches:  Use crutches/walker/cane only if needed after surgery. You can stop using these when you feel stable on your feet.     Physical Therapy:  Depending on your surgery, physical therapy may start within a few days or be delayed 4-6 weeks. At your first post-operative visit, your doctor will direct you on your personal therapy program if needed.     Activity:  Unless otherwise instructed, you can weight bear as tolerated. While laying or sitting down you should straighten your knee all the way out and then gently work on bending the knee back. Do not worry at first if your knee feels stiff and will not bend like normal, this will get better.     Normal findings after surgery:  Numbness and tenderness around the incisions is normal.  You may have bruising around the incisions.  Your knee will be swollen for 3 weeks after surgery. It will feel  tight  to move.   Low grade fevers less than 100.5 degrees Fahrenheit are normal.     When to call the Office:  Temperature greater than 101.5 degrees Fahreheit.  Fever, chills, and increasing pain in the knee.  Excessive drainage from the incisions that include bright red blood.  Drainage from the incisions sites that appear yellow, pus-like, or foul smelling.  Increasing pain the knee not relieved by the prescribed pain medications or ice.  Pain or swelling in your calf area (in back above your ankle)  Any other effects you feel are significant.    [facility]  Same-Day Surgery   Adult Discharge Orders & Instructions     For 24 hours after surgery    1. Get plenty of rest.  A responsible adult must stay with you for at least 24 hours after you leave the hospital.   2. Do not drive or use heavy equipment.  If you have weakness or tingling, don't drive or use heavy equipment until this feeling goes away.  3. Do not drink alcohol.  4. Avoid strenuous or risky activities.  Ask for help when climbing stairs.   5. You may feel lightheaded.  If so, sit for a few minutes before standing.  Have someone help you get up.   6. You may have a slight fever. Call the doctor if your fever is over 100 F (37.7 C) (taken under the tongue) or lasts longer than 24 hours.  7. You may have a dry mouth, a sore throat, muscle aches or trouble sleeping.  These should go away after 24 hours.  8. Do not make important or legal decisions.  We don t expect you to have any problems from the surgery or treatment you had today. Just in case, here s what to do if you have pain, upset stomach (nausea), bleeding or infection:  Pain:  Take medicines your doctor has prescribed or over-the-counter medicine they have suggested. Resting and using ice packs can help, too. For surgery on an arm or leg, raise it on a pillow to ease swelling. Call your doctor if these methods don t work.  Copyright [employer], Licensed under [employer]  Upset stomach (nausea):  Take anti-nausea medicine approved by your doctor. Drink clear liquids like apple juice, ginger ale, broth or 7-Up. Be sure to drink enough fluids. Rest can help, too. Move to normal foods when you re ready.   Bleeding:  In the first 24 hours, you may see a little blood on your dressing, about the size of a quarter. You don t need to worry about this much blood, but if the blood spot keeps getting bigger:    Put pressure on the wound if you can, AND    Call your doctor.  Copyright [employer], Licensed under [employer]  Fever/Infection:  Please call your doctor if you have any of these signs:    Redness    Swelling    Wound feels warm    Pain gets worse    Bad-smelling fluid leaks from wound    Fever or chills  Call your doctor for any of the followin.  It has been over 8 to 10 hours since surgery and you are still not able to urinate (pass water).    Nurse advice line: [phone]

## 2020-02-26 NOTE — OR NURSING
"[facility] Same Day Surgery  Discharge Call Back  Gomez Gutierrez  1960  MRN: [id number]  Home: [phone] (home)   PCP: Francis Lane    We are calling to see how you're doing since your surgery/procedure with us?   Comments: \"I think things are going fantastic.  I am up moving around.  I am trying to gently bend it.\"  Clinical Questions  1. Have you had time to look at your discharge instructions? Do you have any questions in regards to the instructions?   Comment: \"yes, no questions\"  2. Do you feel your pain is being controlled with the regimen the surgeon sent you home on? (ie: prescription medications, over the counter pain medications, ice packs)   Comments: \"Overall the pain is good, a little sore in the morning when I first get moving but it improves throughout the morning.\"  3. Have you noticed any drainage on your dressing? Do you know what to do if you have bleeding as a result of your procedure?   Comments: \"no bleedin\"  4. Have you had any nausea/vomiting? Do you know how to treat this?   Comment: \"no\"  5. Have you had any signs/symptoms of infection? (ie: fever, swelling, heat, drainage or redness) Do you know what to do if you have?   Comment: \"no\"  6. Do you have a follow up appointment made with your surgeon? Do you have a number to contact them at if you need it?   Comment: reviewed date and time of follow up.  Retained Foreign Object (ESTHER, Hemovac, Penrose, Wound Packing, Vaginal Packing, Nasal Splints, Urethral Stents, Vera Catheter)  1. Do you still have  in place?   2. If the item is still in place, can you review the plan for removal with me?       You may be randomly selected to fill out a [facility] [place] [facility] survey. We would appreciate you taking the time to fill this out. It is important to us if you would answer all of the questions on the survey.              "

## 2020-03-05 ENCOUNTER — OFFICE VISIT (OUTPATIENT)
Dept: ORTHOPEDICS | Facility: CLINIC | Age: 60
End: 2020-03-05
Payer: MEDICARE

## 2020-03-05 VITALS
WEIGHT: 174 LBS | HEIGHT: 78 IN | DIASTOLIC BLOOD PRESSURE: 80 MMHG | BODY MASS INDEX: 20.13 KG/M2 | SYSTOLIC BLOOD PRESSURE: 110 MMHG

## 2020-03-05 DIAGNOSIS — M17.31 POST-TRAUMATIC OSTEOARTHRITIS OF RIGHT KNEE: ICD-10-CM

## 2020-03-05 DIAGNOSIS — Z48.89 AFTERCARE FOLLOWING SURGERY: ICD-10-CM

## 2020-03-05 DIAGNOSIS — Z98.890 S/P MEDIAL MENISCECTOMY OF RIGHT KNEE: Primary | ICD-10-CM

## 2020-03-05 PROCEDURE — 99024 POSTOP FOLLOW-UP VISIT: CPT | Performed by: ORTHOPAEDIC SURGERY

## 2020-03-05 ASSESSMENT — PAIN SCALES - GENERAL: PAINLEVEL: MODERATE PAIN (4)

## 2020-03-05 ASSESSMENT — MIFFLIN-ST. JEOR: SCORE: 1730.92

## 2020-03-05 NOTE — PROGRESS NOTES
Orthopedic Clinic Post-Operative Note    CHIEF COMPLAINT:   Chief Complaint   Patient presents with     Surgical Followup     dos:2/24/2020~ARTHROSCOPY, KNEE, WITH MEDIAL MENISCECTOMY right ~10 days postop       HISTORY OF PRESENT ILLNESS  Location: Right knee  Rating of Pain: 4 out of 10  Pain is better with: Rest  Pain improving overall: Definitely.  Patient states that it is about 80% better.  Associated Features: Patient still notices some a.m. pain.  Patient denies numbness or tingling.  Patient concerns: No concerns.  Additional History: Patient doing well on slowly progressing his activity.  Patient is working on his range of motion.    Patient's past medical, surgical, social and family histories reviewed.     Past Medical History:   Diagnosis Date     Arthritis      Lumbago      Major depression in complete remission (H) 8/30/2012     Severe Depression [296.33] 6/18/2009     Sleep apnea      Tobacco use disorder        Past Surgical History:   Procedure Laterality Date     ARTHROSCOPY KNEE WITH MEDIAL MENISCECTOMY Right 2/24/2020    Procedure: ARTHROSCOPY, KNEE, WITH MEDIAL MENISCECTOMY right;  Surgeon: Eliot Bañuelos MD;  Location:  OR     BACK SURGERY  11/2015    cleaned out bone spurs     C ECHO HEART XTHORACIC,COMPLETE, W/O DOPPLER  02/04/07    normal cardiac stress echo test     C ESTEBAN W/O FACETEC FORAMOT/[facility] 1/2 VRT SEG, LUMBAR  1/02    L4-5     C REPAIR LUMBAR HERNIA  1986    L5-4 L5-S1     COLONOSCOPY N/A 2/18/2020    Procedure: Colonoscopy;  Surgeon: Juan Beltrán DO;  Location:  GI     HC COLONOSCOPY W BIOPSY  11/05/09     HC REMOVE TONSILS/ADENOIDS,<11 Y/O      T & A <11y.o.     LAMINECT/DISCECTOMY, LUMBAR  10/19/06    L3-4     LAMINECT/DISCECTOMY, LUMBAR  11/10    left sided L4-5     REMOVAL OF SPERM DUCT(S)      Vasectomy     ROTATOR CUFF REPAIR RT/LT  3/12    right sided       Medications:  HYDROcodone-acetaminophen (NORCO) 5-325 MG tablet, Take 1-2 tablets by mouth  every 6 hours as needed for moderate to severe pain (Patient not taking: Reported on 3/5/2020)  ketorolac (TORADOL) 10 MG tablet, Take 1 tablet (10 mg) by mouth every 6 hours as needed for moderate pain (Patient not taking: Reported on 3/5/2020)  oxyCODONE-acetaminophen (PERCOCET) 5-325 MG tablet, Take 1 tablet by mouth every 6 hours as needed for severe pain  predniSONE (DELTASONE) 10 MG tablet, Take 40 mg po daily for 4-7 days when there is a flair of muscle spasms in the low back region (Patient not taking: Reported on 3/5/2020)  tiZANidine (ZANAFLEX) 2 MG tablet, Take 2 mg by mouth as needed for muscle spasms    No current facility-administered medications on file prior to visit.       Allergies   Allergen Reactions     No Known Drug Allergies        Social History     Occupational History     Employer: DISABLED     Comment: not working    Tobacco Use     Smoking status: Current Every Day Smoker     Packs/day: 1.00     Years: 45.00     Pack years: 45.00     Types: Cigarettes     Smokeless tobacco: Never Used     Tobacco comment:  wants to quit   Substance and Sexual Activity     Alcohol use: Yes     Alcohol/week: 6.0 standard drinks     Types: 6 Cans of beer per week     Frequency: 4 or more times a week     Drinks per session: 5 or 6     Binge frequency: Daily or almost daily     Comment: 4-6 per day     Drug use: Yes     Types: Marijuana     Sexual activity: Yes     Partners: Female       Family History   Problem Relation Age of Onset     Arthritis Father      Depression Father      Heart Failure Father 80        CHF     Respiratory Mother         emphysema     Diabetes Mother      Other - See Comments Brother 21         in an MVA (oldest sibling     Other - See Comments Brother         older  all other siblings are 1/2      Other - See Comments Brother         older     Heart Disease Brother      Other - See Comments Brother         older     Other - See Comments Brother         older     Other - See  "Comments Brother         younger     Other - See Comments Brother 21        motorcycle injury, Brain death     Other - See Comments Brother         full sibling but given up for adoption 1 yr older     Other - See Comments Grandchild         3 grand daughters 1 grand son       REVIEW OF SYSTEMS  General: negative for, night sweats, dizziness, fatigue  Resp: No shortness of breath and no cough  CV: negative for chest pain, syncope or near-syncope  GI: negative for nausea, vomiting and diarrhea  : negative for dysuria and hematuria  Musculoskeletal: as above  Neurologic: negative for syncope   Hematologic: negative for bleeding disorder    Physical Exam:  Vitals: /80   Ht 1.979 m (6' 5.9\")   Wt 78.9 kg (174 lb)   BMI 20.16 kg/m    BMI= Body mass index is 20.16 kg/m .  Constitutional: healthy, alert and no acute distress   Psychiatric: mentation appears normal and affect normal/bright  NEURO: no focal deficits, CMS intact right lower extremity   RESP: Normal with easy respirations and no use of accessory muscles to breathe, no audible wheezing or retractions  CV: +2 radial pulse and his hand is warm to palpation.   SKIN: All 3 incisions healing well.  Minimal swelling and no erythema around the incisions no drainage.  No erythema, rashes, excoriation, or breakdown. No evidence of infection.   MUSCULOSKELETAL:    INSPECTION of right knee: No gross deformities, erythema, edema, ecchymosis, atrophy or fasciculations.     PALPATION: No tenderness medial, lateral, anterior and posterior portion of the knee. No specific joint line tenderness. No increased warmth.  No effusion.     ROM: Extension full, flexion to 125 . All range of motion without catching, locking or pain.       STRENGTH: 5 out of 5 quad and hamstring.     SPECIAL TEST: Patient has a negative Lachman's negative drawer sign. Patient's knee is stable to varus and valgus stress at 30  of flexion. Patient has a negative Bello's.   GAIT: " non-antalgic  Lymph: no palpable lymph nodes    Diagnostic Modalities:  We reviewed the arthroscopy photos.      Impression:  10 days status post right knee arthroscopic medial meniscectomy    Plan:   This patient is doing very well after his meniscectomy.  Educated in incision massage.  Patient can discontinue Ace wrap and can submerge the knee now.  Rest ice and elevation and progression of activity slowly.  If patient is still having issues in 6 weeks could have a cortisone injection but I do not anticipate him needing that.  Follow-up on an as-needed basis.    Re-x-ray on return: No    BP Readings from Last 1 Encounters:   03/05/20 110/80       BP noted to be well controlled today in office.      Patient does use Tobacco products. Patient not ready to quit at this time.  Strongly encouraged smoking cessation.  Risks of smoking and benefits of quitting were discussed.  Information offered: AVS with information about stopping smoking and advised to discuss smoking cessation medications/strategies with Primary care provider.  3-5 Minutes were spent in counseling.    Scribed by:    Tyshawn Barajas PA-C   11:36 AM  3/5/2020    The information in this document, created by a scribe for me, accurately reflects the services I personally performed and the decisions made by me. I have reviewed and approved this document for accuracy    Eliot Bañuelos MD

## 2020-03-05 NOTE — LETTER
3/5/2020         RE: Gomez Gutierrez  [address]  [place] [address]        Dear Colleague,    Thank you for referring your patient, Gomez Gutierrez, to the [facility]. Please see a copy of my visit note below.    Orthopedic Clinic Post-Operative Note    CHIEF COMPLAINT:   Chief Complaint   Patient presents with     Surgical Followup     dos:2/24/2020~ARTHROSCOPY, KNEE, WITH MEDIAL MENISCECTOMY right ~10 days postop       HISTORY OF PRESENT ILLNESS  Location: Right knee  Rating of Pain: 4 out of 10  Pain is better with: Rest  Pain improving overall: Definitely.  Patient states that it is about 80% better.  Associated Features: Patient still notices some a.m. pain.  Patient denies numbness or tingling.  Patient concerns: No concerns.  Additional History: Patient doing well on slowly progressing his activity.  Patient is working on his range of motion.    Patient's past medical, surgical, social and family histories reviewed.     Past Medical History:   Diagnosis Date     Arthritis      Lumbago      Major depression in complete remission (H) 8/30/2012     Severe Depression [296.33] 6/18/2009     Sleep apnea      Tobacco use disorder        Past Surgical History:   Procedure Laterality Date     ARTHROSCOPY KNEE WITH MEDIAL MENISCECTOMY Right 2/24/2020    Procedure: ARTHROSCOPY, KNEE, WITH MEDIAL MENISCECTOMY right;  Surgeon: Eliot Bañuelos MD;  Location:  OR     BACK SURGERY  11/2015    cleaned out bone spurs     C ECHO HEART XTHORACIC,COMPLETE, W/O DOPPLER  02/04/07    normal cardiac stress echo test     C ESTEBAN W/O FACETEC FORAMOT/[facility] 1/2 VRT SEG, LUMBAR  1/02    L4-5     C REPAIR LUMBAR HERNIA  1986    L5-4 L5-S1     COLONOSCOPY N/A 2/18/2020    Procedure: Colonoscopy;  Surgeon: Juan Beltrán DO;  Location:  GI     HC COLONOSCOPY W BIOPSY  11/05/09     HC REMOVE TONSILS/ADENOIDS,<11 Y/O      T & A <11y.o.     LAMINECT/DISCECTOMY, LUMBAR  10/19/06    L3-4      LAMINECT/DISCECTOMY, LUMBAR  11/10    left sided L4-5     REMOVAL OF SPERM DUCT(S)      Vasectomy     ROTATOR CUFF REPAIR RT/LT  3/12    right sided       Medications:  HYDROcodone-acetaminophen (NORCO) 5-325 MG tablet, Take 1-2 tablets by mouth every 6 hours as needed for moderate to severe pain (Patient not taking: Reported on 3/5/2020)  ketorolac (TORADOL) 10 MG tablet, Take 1 tablet (10 mg) by mouth every 6 hours as needed for moderate pain (Patient not taking: Reported on 3/5/2020)  oxyCODONE-acetaminophen (PERCOCET) 5-325 MG tablet, Take 1 tablet by mouth every 6 hours as needed for severe pain  predniSONE (DELTASONE) 10 MG tablet, Take 40 mg po daily for 4-7 days when there is a flair of muscle spasms in the low back region (Patient not taking: Reported on 3/5/2020)  tiZANidine (ZANAFLEX) 2 MG tablet, Take 2 mg by mouth as needed for muscle spasms    No current facility-administered medications on file prior to visit.       Allergies   Allergen Reactions     No Known Drug Allergies        Social History     Occupational History     Employer: DISABLED     Comment: not working    Tobacco Use     Smoking status: Current Every Day Smoker     Packs/day: 1.00     Years: 45.00     Pack years: 45.00     Types: Cigarettes     Smokeless tobacco: Never Used     Tobacco comment:  wants to quit   Substance and Sexual Activity     Alcohol use: Yes     Alcohol/week: 6.0 standard drinks     Types: 6 Cans of beer per week     Frequency: 4 or more times a week     Drinks per session: 5 or 6     Binge frequency: Daily or almost daily     Comment: 4-6 per day     Drug use: Yes     Types: Marijuana     Sexual activity: Yes     Partners: Female       Family History   Problem Relation Age of Onset     Arthritis Father      Depression Father      Heart Failure Father 80        CHF     Respiratory Mother         emphysema     Diabetes Mother      Other - See Comments Brother 21         in an MVA (oldest sibling     Other - See  "Comments Brother         older  all other siblings are 1/2      Other - See Comments Brother         older     Heart Disease Brother      Other - See Comments Brother         older     Other - See Comments Brother         older     Other - See Comments Brother         younger     Other - See Comments Brother 21        motorcycle injury, Brain death     Other - See Comments Brother         full sibling but given up for adoption 1 yr older     Other - See Comments Grandchild         3 grand daughters 1 grand son       REVIEW OF SYSTEMS  General: negative for, night sweats, dizziness, fatigue  Resp: No shortness of breath and no cough  CV: negative for chest pain, syncope or near-syncope  GI: negative for nausea, vomiting and diarrhea  : negative for dysuria and hematuria  Musculoskeletal: as above  Neurologic: negative for syncope   Hematologic: negative for bleeding disorder    Physical Exam:  Vitals: /80   Ht 1.979 m (6' 5.9\")   Wt 78.9 kg (174 lb)   BMI 20.16 kg/m     BMI= Body mass index is 20.16 kg/m .  Constitutional: healthy, alert and no acute distress   Psychiatric: mentation appears normal and affect normal/bright  NEURO: no focal deficits, CMS intact right lower extremity   RESP: Normal with easy respirations and no use of accessory muscles to breathe, no audible wheezing or retractions  CV: +2 radial pulse and his hand is warm to palpation.   SKIN: All 3 incisions healing well.  Minimal swelling and no erythema around the incisions no drainage.  No erythema, rashes, excoriation, or breakdown. No evidence of infection.   MUSCULOSKELETAL:    INSPECTION of right knee: No gross deformities, erythema, edema, ecchymosis, atrophy or fasciculations.     PALPATION: No tenderness medial, lateral, anterior and posterior portion of the knee. No specific joint line tenderness. No increased warmth.  No effusion.     ROM: Extension full, flexion to 125 . All range of motion without catching, locking or pain.  "      STRENGTH: 5 out of 5 quad and hamstring.     SPECIAL TEST: Patient has a negative Lachman's negative drawer sign. Patient's knee is stable to varus and valgus stress at 30  of flexion. Patient has a negative Bello's.   GAIT: non-antalgic  Lymph: no palpable lymph nodes    Diagnostic Modalities:  We reviewed the arthroscopy photos.      Impression:  10 days status post right knee arthroscopic medial meniscectomy    Plan:   This patient is doing very well after his meniscectomy.  Educated in incision massage.  Patient can discontinue Ace wrap and can submerge the knee now.  Rest ice and elevation and progression of activity slowly.  If patient is still having issues in 6 weeks could have a cortisone injection but I do not anticipate him needing that.  Follow-up on an as-needed basis.    Re-x-ray on return: No    BP Readings from Last 1 Encounters:   03/05/20 110/80       BP noted to be well controlled today in office.      Patient does use Tobacco products. Patient not ready to quit at this time.  Strongly encouraged smoking cessation.  Risks of smoking and benefits of quitting were discussed.  Information offered: AVS with information about stopping smoking and advised to discuss smoking cessation medications/strategies with Primary care provider.  3-5 Minutes were spent in counseling.    Scribed by:    Tyshawn Barajas PA-C   11:36 AM  3/5/2020    The information in this document, created by a scribe for me, accurately reflects the services I personally performed and the decisions made by me. I have reviewed and approved this document for accuracy    Eliot Bañuelos MD      Again, thank you for allowing me to participate in the care of your patient.        Sincerely,        Eliot Bañuelos MD

## 2020-03-15 ENCOUNTER — HEALTH MAINTENANCE LETTER (OUTPATIENT)
Age: 60
End: 2020-03-15

## 2020-04-22 ENCOUNTER — TELEPHONE (OUTPATIENT)
Dept: FAMILY MEDICINE | Facility: CLINIC | Age: 60
End: 2020-04-22

## 2020-04-22 NOTE — TELEPHONE ENCOUNTER
Reason for Call:  Other     Detailed comments: Gomez's wife calls to give Dr Lane information/update on Gomez.  Gomez fell again yesterday due to his left drop foot.  When he fell he landed on his right knee that he had surgery on.  It is bruised and they are icing it at this time.  Per their  they were to call to report this to Dr Lane and were also instructed not to come into clinic due to Covid-19    Phone Number Patient can be reached at: Home number on file [phone] (home)    Best Time: any    Can we leave a detailed message on this number? YES    Call taken on 4/22/2020 at 12:14 PM by Betsy Simpson

## 2020-07-30 ENCOUNTER — VIRTUAL VISIT (OUTPATIENT)
Dept: FAMILY MEDICINE | Facility: CLINIC | Age: 60
End: 2020-07-30
Payer: MEDICARE

## 2020-07-30 DIAGNOSIS — F10.10 ALCOHOL ABUSE: Primary | ICD-10-CM

## 2020-07-30 DIAGNOSIS — F10.930 ALCOHOL WITHDRAWAL SYNDROME WITHOUT COMPLICATION (H): ICD-10-CM

## 2020-07-30 PROCEDURE — 99214 OFFICE O/P EST MOD 30 MIN: CPT | Mod: 95 | Performed by: FAMILY MEDICINE

## 2020-07-30 RX ORDER — DIAZEPAM 5 MG
TABLET ORAL
Qty: 28 TABLET | Refills: 0 | Status: SHIPPED | OUTPATIENT
Start: 2020-07-30 | End: 2020-08-04

## 2020-07-30 NOTE — PROGRESS NOTES
"Gomez Gutierrez is a 60 year old male who is being evaluated via a billable video visit.      The patient has been notified of following:     \"This video visit will be conducted via a call between you and your physician/provider. We have found that certain health care needs can be provided without the need for an in-person physical exam.  This service lets us provide the care you need with a video conversation.  If a prescription is necessary we can send it directly to your pharmacy.  If lab work is needed we can place an order for that and you can then stop by our lab to have the test done at a later time.    Video visits are billed at different rates depending on your insurance coverage.  Please reach out to your insurance provider with any questions.    If during the course of the call the physician/provider feels a video visit is not appropriate, you will not be charged for this service.\"    Patient has given verbal consent for Video visit? Yes  How would you like to obtain your AVS? MyChart  If you are dropped from the video visit, the video invite should be resent to: Text to cell phone: [phone]  Will anyone else be joining your video visit? No      Subjective     Gomez Gutierrez is a 60 year old male who presents today via video visit for the following health issues:    HPI    Chief Complaint   Patient presents with     Alcohol Problem     Wants to quit drinking and needs some help doing that.     YASMIN/MARVEL Camarillo admits that since moving back to [place] from [place] he has been drinking extremely heavily.  He is always been a moderate to heavy drinker drinking anywhere between 2-4 alcoholic beverages on a daily basis.  Since moving back to [place] he has been drinking very heavily and is now up to 20 beers per evening and 2-4 shots of fireball.  He really wants to quit drinking and would like to do a cold turkey but is aware that he could go through withdrawal.  When I asked him about how this happened, " he states that just trying to deal with all of his issues with his Workmen's Comp. claim and all of the runaround he is been getting over the past 20 years since really weight on him.  I told him that I had always admired him for dealing with that stress but obviously he has been hiding the alcohol use from me.  He admits to that now and since being back to [place] this past 3 years he has really gotten out of hand.  His wife has brought it up with him a number of times and he is ready to quit drinking.  He is not interested in doing a 12-step program or going to  or any sort of outpatient treatment program.  He would like to try to do it himself cold turkey but wants to do something to help prevent withdrawal.  We talked about using taper doses of Valium over the next 4 to 5 days after he quits drinking.  He is interested in trying this.  He has never had an issue with using narcotics or any other prescribed medication.    Video Start Time: 3:57 PM    Drinking a lot       Patient Active Problem List   Diagnosis     Tobacco use disorder     Psychosexual dysfunction with inhibited sexual excitement     Other male erectile dysfunction     iamLUMBAR DISC DISPLACEMENT     iamPOSTSURGICAL STATES NEC     Obstructive sleep apnea     Neuropathic pain syndrome (non-herpetic)     CARDIOVASCULAR SCREENING; LDL GOAL LESS THAN 160     Foot drop, left     Lumbar radiculopathy     Chronic midline low back pain with sciatica, sciatica laterality unspecified     Personal history of nicotine dependence      Glaucoma suspect, bilateral     Discoloration of skin of hand (bilateral) hands turn white then red and purple when cold with burning sensation     Back muscle spasm     Acute medial meniscus tear of right knee, subsequent encounter     Tear of left rotator cuff, unspecified tear extent, unspecified whether traumatic     Past Surgical History:   Procedure Laterality Date     ARTHROSCOPY KNEE WITH MEDIAL MENISCECTOMY Right  2020    Procedure: ARTHROSCOPY, KNEE, WITH MEDIAL MENISCECTOMY right;  Surgeon: Eliot Bañuelos MD;  Location: [facility]     BACK SURGERY  2015    cleaned out bone spurs     C ECHO HEART XTHORACIC,COMPLETE, W/O DOPPLER  07    normal cardiac stress echo test     C ESTEBAN W/O FACETEC FORAMOT/DSKC  VRT SEG, LUMBAR      L4-5     C REPAIR LUMBAR HERNIA      L5-4 L5-S1     COLONOSCOPY N/A 2020    Procedure: Colonoscopy;  Surgeon: Juan Beltrán DO;  Location: [facility]     HC COLONOSCOPY W BIOPSY  09     HC REMOVE TONSILS/ADENOIDS,<11 Y/O      T & A <11y.o.     LAMINECT/DISCECTOMY, LUMBAR  10/19/06    L3-4     LAMINECT/DISCECTOMY, LUMBAR  11/10    left sided L4-5     REMOVAL OF SPERM DUCT(S)      Vasectomy     ROTATOR CUFF REPAIR RT/LT  3/12    right sided       Social History     Tobacco Use     Smoking status: Current Every Day Smoker     Packs/day: 1.00     Years: 45.00     Pack years: 45.00     Types: Cigarettes     Smokeless tobacco: Never Used     Tobacco comment:  wants to quit   Substance Use Topics     Alcohol use: Yes     Alcohol/week: 6.0 standard drinks     Types: 6 Cans of beer per week     Frequency: 4 or more times a week     Drinks per session: 5 or 6     Binge frequency: Daily or almost daily     Comment: 4-6 per day     Family History   Problem Relation Age of Onset     Arthritis Father      Depression Father      Heart Failure Father 80        CHF     Respiratory Mother         emphysema     Diabetes Mother      Other - See Comments Brother 21         in an MVA (oldest sibling     Other - See Comments Brother         older  all other siblings are 1/2      Other - See Comments Brother         older     Heart Disease Brother      Other - See Comments Brother         older     Other - See Comments Brother         older     Other - See Comments Brother         younger     Other - See Comments Brother 21        motorcycle injury, Brain death     Other - See  Comments Brother         full sibling but given up for adoption 1 yr older     Other - See Comments Grandchild         3 grand daughters 1 grand son         Current Outpatient Medications   Medication Sig Dispense Refill     diazepam (VALIUM) 5 MG tablet Take 2 tablets (10 mg) by mouth every 6 hours for 1 day, THEN 1 tablet (5 mg) every 6 hours for 1 day, THEN 1 tablet (5 mg) every 6 hours as needed for withdrawal. 28 tablet 0     ketorolac (TORADOL) 10 MG tablet Take 1 tablet (10 mg) by mouth every 6 hours as needed for moderate pain 20 tablet 3     oxyCODONE-acetaminophen (PERCOCET) 5-325 MG tablet Take 1 tablet by mouth every 6 hours as needed for severe pain       predniSONE (DELTASONE) 10 MG tablet Take 40 mg po daily for 4-7 days when there is a flair of muscle spasms in the low back region 60 tablet 1     Allergies   Allergen Reactions     No Known Drug Allergies      Recent Labs   Lab Test 12/10/18  1237   LDL 57   HDL 74   TRIG 43   CR 0.85   GFRESTIMATED >90   GFRESTBLACK >90   POTASSIUM 4.5      BP Readings from Last 3 Encounters:   03/05/20 110/80   02/24/20 (!) 144/85   02/18/20 99/62    Wt Readings from Last 3 Encounters:   03/05/20 78.9 kg (174 lb)   02/13/20 78.9 kg (174 lb)   01/30/20 77.8 kg (171 lb 8 oz)                    Reviewed and updated as needed this visit by Provider  Tobacco  Allergies  Meds  Problems  Med Hx  Surg Hx  Fam Hx         Review of Systems   Constitutional, HEENT, cardiovascular, pulmonary, gi and gu systems are negative, except as otherwise noted.      Objective             Physical Exam     GENERAL: On the video call the patient appears normal and in good spirits.  He is a little embarrassed by his alcohol use which is understandable.  I have known him for 20+ years and this is the first time that he is really admitted to over drinking.  PSYCH: Mentation appears normal, affect normal/bright, judgement and insight intact, normal speech and appearance  well-groomed.      Diagnostic Test Results:  Labs reviewed in [facility]  Labs have been ordered and he will do those this coming week.        Assessment & Plan     (F10.10) Alcohol abuse  (primary encounter diagnosis)  Comment: Excessive alcohol use up to 20 beers per day along with 2-4 shots of fireball.  Plan: Comprehensive metabolic panel (BMP + Alb, Alk         Phos, ALT, AST, Total. Bili, TP), GGT, *UA         reflex to Microscopic and Culture ([place];         [facility]; [facility]; [facility];         [facility]; [facility]; [facility];         [place]), CBC with platelets and differential,         diazepam (VALIUM) 5 MG tablet        I did order labs to be done this week so we can get a baseline set of labs and then repeat them once he is done drinking.    (F10.230) Alcohol withdrawal syndrome without complication (H)  Comment: To help prevent withdrawal, I talked him about using Valium.  Plan: diazepam (VALIUM) 5 MG tablet        Please see the order in epic for his Valium use.  Working to start out with 10 mg every 6 hours for 24 hours then decrease the dosing based on my written prescription.  We will do a follow-up phone call with him after I get his labs.      Return in about 4 weeks (around 8/27/2020) for Lab Work, Video Visit.    Francis Lane MD, MD  [facility]      Video-Visit Details    Type of service:  Video Visit    Video End Time:16:17 PM    Originating Location (pt. Location): Home    Distant Location (provider location):  [facility]     Platform used for Video Visit: Doximity    Return in about 4 weeks (around 8/27/2020) for Lab Work, Video Visit.       Electronically signed by:  Francis Lane M.D.  7/31/2020

## 2020-07-30 NOTE — NURSING NOTE
Chief Complaint   Patient presents with     Alcohol Problem     Wants to quit drinking and needs some help doing that.     MP/MA

## 2020-08-03 DIAGNOSIS — F10.10 ALCOHOL ABUSE: ICD-10-CM

## 2020-08-03 LAB
ALBUMIN SERPL-MCNC: 4.2 G/DL (ref 3.4–5)
ALBUMIN UR-MCNC: NEGATIVE MG/DL
ALP SERPL-CCNC: 104 U/L (ref 40–150)
ALT SERPL W P-5'-P-CCNC: 33 U/L (ref 0–70)
ANION GAP SERPL CALCULATED.3IONS-SCNC: 3 MMOL/L (ref 3–14)
APPEARANCE UR: CLEAR
AST SERPL W P-5'-P-CCNC: 31 U/L (ref 0–45)
BASOPHILS # BLD AUTO: 0.1 10E9/L (ref 0–0.2)
BASOPHILS NFR BLD AUTO: 1.1 %
BILIRUB SERPL-MCNC: 0.7 MG/DL (ref 0.2–1.3)
BILIRUB UR QL STRIP: NEGATIVE
BUN SERPL-MCNC: 14 MG/DL (ref 7–30)
CALCIUM SERPL-MCNC: 9.5 MG/DL (ref 8.5–10.1)
CHLORIDE SERPL-SCNC: 110 MMOL/L (ref 94–109)
CO2 SERPL-SCNC: 29 MMOL/L (ref 20–32)
COLOR UR AUTO: YELLOW
CREAT SERPL-MCNC: 0.83 MG/DL (ref 0.66–1.25)
DIFFERENTIAL METHOD BLD: NORMAL
EOSINOPHIL NFR BLD AUTO: 2.9 %
ERYTHROCYTE [DISTWIDTH] IN BLOOD BY AUTOMATED COUNT: 14.6 % (ref 10–15)
GFR SERPL CREATININE-BSD FRML MDRD: >90 ML/MIN/{1.73_M2}
GGT SERPL-CCNC: 27 U/L (ref 0–75)
GLUCOSE SERPL-MCNC: 93 MG/DL (ref 70–99)
GLUCOSE UR STRIP-MCNC: NEGATIVE MG/DL
HCT VFR BLD AUTO: 44.4 % (ref 40–53)
HGB BLD-MCNC: 15.2 G/DL (ref 13.3–17.7)
HGB UR QL STRIP: NEGATIVE
IMM GRANULOCYTES # BLD: 0 10E9/L (ref 0–0.4)
IMM GRANULOCYTES NFR BLD: 0.3 %
KETONES UR STRIP-MCNC: NEGATIVE MG/DL
LEUKOCYTE ESTERASE UR QL STRIP: NEGATIVE
LYMPHOCYTES # BLD AUTO: 1.9 10E9/L (ref 0.8–5.3)
LYMPHOCYTES NFR BLD AUTO: 26.8 %
MCH RBC QN AUTO: 32.6 PG (ref 26.5–33)
MCHC RBC AUTO-ENTMCNC: 34.2 G/DL (ref 31.5–36.5)
MCV RBC AUTO: 95 FL (ref 78–100)
MONOCYTES # BLD AUTO: 0.7 10E9/L (ref 0–1.3)
MONOCYTES NFR BLD AUTO: 10.1 %
NEUTROPHILS # BLD AUTO: 4.3 10E9/L (ref 1.6–8.3)
NEUTROPHILS NFR BLD AUTO: 58.8 %
NITRATE UR QL: NEGATIVE
NRBC # BLD AUTO: 0 10*3/UL
NRBC BLD AUTO-RTO: 0 /100
PH UR STRIP: 6 PH (ref 5–7)
PLATELET # BLD AUTO: 215 10E9/L (ref 150–450)
POTASSIUM SERPL-SCNC: 4.6 MMOL/L (ref 3.4–5.3)
PROT SERPL-MCNC: 7.6 G/DL (ref 6.8–8.8)
RBC # BLD AUTO: 4.66 10E12/L (ref 4.4–5.9)
SODIUM SERPL-SCNC: 142 MMOL/L (ref 133–144)
SOURCE: NORMAL
SP GR UR STRIP: 1.02 (ref 1–1.03)
UROBILINOGEN UR STRIP-MCNC: 0 MG/DL (ref 0–2)
WBC # BLD AUTO: 7.2 10E9/L (ref 4–11)

## 2020-08-03 PROCEDURE — 82977 ASSAY OF GGT: CPT | Performed by: FAMILY MEDICINE

## 2020-08-03 PROCEDURE — 36415 COLL VENOUS BLD VENIPUNCTURE: CPT | Performed by: FAMILY MEDICINE

## 2020-08-03 PROCEDURE — 80053 COMPREHEN METABOLIC PANEL: CPT | Performed by: FAMILY MEDICINE

## 2020-08-03 PROCEDURE — 81003 URINALYSIS AUTO W/O SCOPE: CPT | Performed by: FAMILY MEDICINE

## 2020-08-03 PROCEDURE — 85025 COMPLETE CBC W/AUTO DIFF WBC: CPT | Performed by: FAMILY MEDICINE

## 2020-10-15 ENCOUNTER — TELEPHONE (OUTPATIENT)
Dept: FAMILY MEDICINE | Facility: CLINIC | Age: 60
End: 2020-10-15

## 2020-10-27 ENCOUNTER — ANCILLARY PROCEDURE (OUTPATIENT)
Dept: GENERAL RADIOLOGY | Facility: CLINIC | Age: 60
End: 2020-10-27
Attending: PHYSICIAN ASSISTANT
Payer: MEDICARE

## 2020-10-27 ENCOUNTER — OFFICE VISIT (OUTPATIENT)
Dept: ORTHOPEDICS | Facility: CLINIC | Age: 60
End: 2020-10-27
Payer: MEDICARE

## 2020-10-27 ENCOUNTER — TELEPHONE (OUTPATIENT)
Dept: FAMILY MEDICINE | Facility: CLINIC | Age: 60
End: 2020-10-27

## 2020-10-27 VITALS
HEIGHT: 78 IN | DIASTOLIC BLOOD PRESSURE: 70 MMHG | BODY MASS INDEX: 20.33 KG/M2 | SYSTOLIC BLOOD PRESSURE: 130 MMHG | WEIGHT: 175.7 LBS

## 2020-10-27 DIAGNOSIS — S46.012D TRAUMATIC COMPLETE TEAR OF LEFT ROTATOR CUFF, SUBSEQUENT ENCOUNTER: ICD-10-CM

## 2020-10-27 DIAGNOSIS — M25.512 ACUTE PAIN OF LEFT SHOULDER: ICD-10-CM

## 2020-10-27 DIAGNOSIS — M25.512 ACUTE PAIN OF LEFT SHOULDER: Primary | ICD-10-CM

## 2020-10-27 PROBLEM — M75.122 NONTRAUMATIC COMPLETE TEAR OF LEFT ROTATOR CUFF: Status: ACTIVE | Noted: 2020-10-27

## 2020-10-27 PROCEDURE — 73030 X-RAY EXAM OF SHOULDER: CPT | Mod: LT | Performed by: RADIOLOGY

## 2020-10-27 PROCEDURE — 99213 OFFICE O/P EST LOW 20 MIN: CPT | Performed by: ORTHOPAEDIC SURGERY

## 2020-10-27 ASSESSMENT — MIFFLIN-ST. JEOR: SCORE: 1733.84

## 2020-10-27 ASSESSMENT — PAIN SCALES - GENERAL: PAINLEVEL: EXTREME PAIN (8)

## 2020-10-27 NOTE — LETTER
10/27/2020         RE: Gomez Gutierrez  [address]  [facility] [address]        Dear Colleague,    Thank you for referring your patient, Gomez Gutierrez, to the [facility]. Please see a copy of my visit note below.    Gomez Gutierrez is a 60 year old male who is seen as self referral for left shoulder pain.  This started last year in October 2019 when he fell injuring his left shoulder and right knee.  He had had prior right shoulder problems with rotator cuff tear and repair.  In January he had MRI scan on both the knee and shoulder.  The knee had a medial meniscus tear and it was scheduled for arthroscopy of that.  The left shoulder had a large rotator cuff tear involving all of the supraspinatus with 2 cm retraction.  The tear was starting to get into the fibers of the infraspinatus and subscapularis tendon there was tendinosis and some intrasubstance tearing of the biceps tendon and a type III acromion.  They started with the knee arthroscopy on February 24, 2020.  Once he was recovered from that we were into the Covid restrictions, so he never had the left shoulder repaired.  He went through the summer with very little pain of the shoulder but just some limitations on strength.  He now fell 2 weeks ago and has increased pain in the shoulder.  He describes constant pain rated 8 out of 10 especially with use of the shoulder.    X-ray of the shoulder shows irregularity on the tuberosity.  There is only minor changes from prior x-ray.  No definite fractures are seen.    Past Medical History:   Diagnosis Date     Arthritis      Lumbago      Major depression in complete remission (H) 8/30/2012     Severe Depression [296.33] 6/18/2009     Sleep apnea      Tobacco use disorder        Past Surgical History:   Procedure Laterality Date     ARTHROSCOPY KNEE WITH MEDIAL MENISCECTOMY Right 2/24/2020    Procedure: ARTHROSCOPY, KNEE, WITH MEDIAL MENISCECTOMY right;  Surgeon: Eliot Bañuelos  MD Danielle;  Location: [facility]     BACK SURGERY  2015    cleaned out bone spurs     C ECHO HEART XTHORACIC,COMPLETE, W/O DOPPLER  07    normal cardiac stress echo test     C ESTEBAN W/O FACETEC FORAMOT/[facility]KC  VRT SEG, LUMBAR      L4-5     C REPAIR LUMBAR HERNIA      L5-4 L5-S1     COLONOSCOPY N/A 2020    Procedure: Colonoscopy;  Surgeon: Juan Beltrán DO;  Location: [facility]     HC COLONOSCOPY W BIOPSY  09     HC REMOVE TONSILS/ADENOIDS,<11 Y/O      T & A <11y.o.     LAMINECT/DISCECTOMY, LUMBAR  10/19/06    L3-4     LAMINECT/DISCECTOMY, LUMBAR  11/10    left sided L4-5     REMOVAL OF SPERM DUCT(S)      Vasectomy     ROTATOR CUFF REPAIR RT/LT  3/12    right sided       Family History   Problem Relation Age of Onset     Arthritis Father      Depression Father      Heart Failure Father 80        CHF     Respiratory Mother         emphysema     Diabetes Mother      Other - See Comments Brother 21         in an MVA (oldest sibling     Other - See Comments Brother         older  all other siblings are 1/2      Other - See Comments Brother         older     Heart Disease Brother      Other - See Comments Brother         older     Other - See Comments Brother         older     Other - See Comments Brother         younger     Other - See Comments Brother 21        motorcycle injury, Brain death     Other - See Comments Brother         full sibling but given up for adoption 1 yr older     Other - See Comments Grandchild         3 grand daughters 1 grand son       Social History     Socioeconomic History     Marital status:      Spouse name: Jana     Number of children: 2     Years of education: 14     Highest education level: Not on file   Occupational History     Employer: DISABLED     Comment: not working    Social Needs     Financial resource strain: Not very hard     Food insecurity     Worry: Never true     Inability: Never true     Transportation needs     Medical: No      Non-medical: No   Tobacco Use     Smoking status: Current Every Day Smoker     Packs/day: 1.00     Years: 45.00     Pack years: 45.00     Types: Cigarettes     Smokeless tobacco: Never Used     Tobacco comment:  wants to quit   Substance and Sexual Activity     Alcohol use: Yes     Alcohol/week: 6.0 standard drinks     Types: 6 Cans of beer per week     Frequency: 4 or more times a week     Drinks per session: 5 or 6     Binge frequency: Daily or almost daily     Comment: 4-6 per day     Drug use: Yes     Types: Marijuana     Sexual activity: Yes     Partners: Female   Lifestyle     Physical activity     Days per week: 0 days     Minutes per session: 0 min     Stress: Not at all   Relationships     Social connections     Talks on phone: Once a week     Gets together: Once a week     Attends Jain service: Never     Active member of club or organization: No     Attends meetings of clubs or organizations: Never     Relationship status:      Intimate partner violence     Fear of current or ex partner: No     Emotionally abused: No     Physically abused: No     Forced sexual activity: No   Other Topics Concern      Service No     Blood Transfusions No     Caffeine Concern Yes     Occupational Exposure No     Hobby Hazards No     Sleep Concern Yes     Comment: not always well     Stress Concern No     Comment: markedly improved     Weight Concern No     Special Diet No     Back Care No     Comment: improved since surgery in 10/06     Exercise Yes     Comment: daily for 20 minutes at a time, doing some golfing and that is going well.     Bike Helmet Not Asked     Seat Belt Yes     Self-Exams Yes     Parent/sibling w/ CABG, MI or angioplasty before 65F 55M? Not Asked   Social History Narrative     Not on file       Current Outpatient Medications   Medication Sig Dispense Refill     ketorolac (TORADOL) 10 MG tablet Take 1 tablet (10 mg) by mouth every 6 hours as needed for moderate pain 20 tablet 3      "oxyCODONE-acetaminophen (PERCOCET) 5-325 MG tablet Take 1 tablet by mouth every 6 hours as needed for severe pain       predniSONE (DELTASONE) 10 MG tablet Take 40 mg po daily for 4-7 days when there is a flair of muscle spasms in the low back region (Patient not taking: Reported on 10/27/2020) 60 tablet 1       Allergies   Allergen Reactions     No Known Drug Allergies        REVIEW OF SYSTEMS:  CONSTITUTIONAL:  NEGATIVE for fever, chills, change in weight, not feeling tired  SKIN:  NEGATIVE for worrisome rashes, no skin lumps, no skin ulcers and no non-healing wounds  EYES:  NEGATIVE for vision changes or irritation.  ENT/MOUTH:  NEGATIVE.  No hearing loss, no hoarseness, no difficulty swallowing.  RESP:  NEGATIVE. No cough or shortness of breath.  CV:  NEGATIVE for chest pain, palpitations or peripheral edema  GI:  NEGATIVE for nausea, abdominal pain, heartburn, or change in bowel habits  :  Negative. No dysuria, no hematuria  MUSCULOSKELETAL:  See HPI above  NEURO:  NEGATIVE . No headaches, no dizziness,  no numbness  ENDOCRINE:  NEGATIVE for temperature intolerance, skin/hair changes  HEME/ALLERGY/IMMUNE:  NEGATIVE for bleeding problems  PSYCHIATRIC:  NEGATIVE. no anxiety, no depression.     Exam:  Vitals: /70   Ht 1.979 m (6' 5.91\")   Wt 79.7 kg (175 lb 11.2 oz)   BMI 20.35 kg/m    BMI= Body mass index is 20.35 kg/m .  Constitutional:  healthy, alert and no distress  Neuro: Alert and Oriented x 3, Sensation grossly WNL.  Psych: Affect normal   Respiratory: Breathing not labored.  Cardiovascular: normal peripheral pulses  Lymph: no adenopathy  Skin: No rashes,worrisome lesions or skin problems  He has pain with movements of the left shoulder overhead and and rotation.  He is not frozen however.  Both shoulders show weakness in external rotation and abduction especially out at 90 degrees but also at his side.  The left is more painful with resisted abduction and external rotation.  Neither shoulder " shock causes pain with resisted internal rotation and he has good strength of this.  He does have some tenderness in the subacromial space on the left negative on the right.  There is no tenderness on the acromioclavicular joint.  Sensation, motor and circulation are intact.    Assessment:  Chronic left complete rotator cuff tear.  This is been present for 1 year and is most likely unrepairable at this point given its size and retraction previously.  The acute pain may be further tearing or may be a small crack of the tuberosity.  If the cuff is already unrepairable, this new injury would not influence our treatment.  I discussed possibility of repeating MRI scan, but most likely this would not influence our treatment.  I have recommended symptomatic treatment at this time giving time for this to recover.  If pain is worse in the future a reverse total shoulder arthroplasty could be performed.  We could also try steroid injection as a first step.  I would wait about a month to see if this is an acute fracture and allow it to heal.  If he has persistent pain in the month we should see back with repeat x-ray of the left shoulder and plan for steroid injection      Again, thank you for allowing me to participate in the care of your patient.        Sincerely,        Jose Aden MD

## 2020-10-27 NOTE — PROGRESS NOTES
Gomez Gutierrez is a 60 year old male who is seen as self referral for left shoulder pain.  This started last year in October 2019 when he fell injuring his left shoulder and right knee.  He had had prior right shoulder problems with rotator cuff tear and repair.  In January he had MRI scan on both the knee and shoulder.  The knee had a medial meniscus tear and it was scheduled for arthroscopy of that.  The left shoulder had a large rotator cuff tear involving all of the supraspinatus with 2 cm retraction.  The tear was starting to get into the fibers of the infraspinatus and subscapularis tendon there was tendinosis and some intrasubstance tearing of the biceps tendon and a type III acromion.  They started with the knee arthroscopy on February 24, 2020.  Once he was recovered from that we were into the Covid restrictions, so he never had the left shoulder repaired.  He went through the summer with very little pain of the shoulder but just some limitations on strength.  He now fell 2 weeks ago and has increased pain in the shoulder.  He describes constant pain rated 8 out of 10 especially with use of the shoulder.    X-ray of the shoulder shows irregularity on the tuberosity.  There is only minor changes from prior x-ray.  No definite fractures are seen.    Past Medical History:   Diagnosis Date     Arthritis      Lumbago      Major depression in complete remission (H) 8/30/2012     Severe Depression [296.33] 6/18/2009     Sleep apnea      Tobacco use disorder        Past Surgical History:   Procedure Laterality Date     ARTHROSCOPY KNEE WITH MEDIAL MENISCECTOMY Right 2/24/2020    Procedure: ARTHROSCOPY, KNEE, WITH MEDIAL MENISCECTOMY right;  Surgeon: Eliot Bañuelos MD;  Location: [facility]     BACK SURGERY  11/2015    cleaned out bone spurs     C ECHO HEART XTHORACIC,COMPLETE, W/O DOPPLER  02/04/07    normal cardiac stress echo test     C ESTEBAN W/O FACETEC FORAMOT/DSKC 1/2 VRT SEG, LUMBAR  1/02    L4-5     C  REPAIR LUMBAR HERNIA      L5-4 L5-S1     COLONOSCOPY N/A 2020    Procedure: Colonoscopy;  Surgeon: Juan Beltrán DO;  Location: [facility]     HC COLONOSCOPY W BIOPSY  09     HC REMOVE TONSILS/ADENOIDS,<11 Y/O      T & A <11y.o.     LAMINECT/DISCECTOMY, LUMBAR  10/19/06    L3-4     LAMINECT/DISCECTOMY, LUMBAR  11/10    left sided L4-5     REMOVAL OF SPERM DUCT(S)      Vasectomy     ROTATOR CUFF REPAIR RT/LT  3/12    right sided       Family History   Problem Relation Age of Onset     Arthritis Father      Depression Father      Heart Failure Father 80        CHF     Respiratory Mother         emphysema     Diabetes Mother      Other - See Comments Brother 21         in an MVA (oldest sibling     Other - See Comments Brother         older  all other siblings are 1/2      Other - See Comments Brother         older     Heart Disease Brother      Other - See Comments Brother         older     Other - See Comments Brother         older     Other - See Comments Brother         younger     Other - See Comments Brother 21        motorcycle injury, Brain death     Other - See Comments Brother         full sibling but given up for adoption 1 yr older     Other - See Comments Grandchild         3 grand daughters 1 grand son       Social History     Socioeconomic History     Marital status:      Spouse name: Jana     Number of children: 2     Years of education: 14     Highest education level: Not on file   Occupational History     Employer: DISABLED     Comment: not working    Social Needs     Financial resource strain: Not very hard     Food insecurity     Worry: Never true     Inability: Never true     Transportation needs     Medical: No     Non-medical: No   Tobacco Use     Smoking status: Current Every Day Smoker     Packs/day: 1.00     Years: 45.00     Pack years: 45.00     Types: Cigarettes     Smokeless tobacco: Never Used     Tobacco comment:  wants to quit   Substance and Sexual  Activity     Alcohol use: Yes     Alcohol/week: 6.0 standard drinks     Types: 6 Cans of beer per week     Frequency: 4 or more times a week     Drinks per session: 5 or 6     Binge frequency: Daily or almost daily     Comment: 4-6 per day     Drug use: Yes     Types: Marijuana     Sexual activity: Yes     Partners: Female   Lifestyle     Physical activity     Days per week: 0 days     Minutes per session: 0 min     Stress: Not at all   Relationships     Social connections     Talks on phone: Once a week     Gets together: Once a week     Attends Tenriism service: Never     Active member of club or organization: No     Attends meetings of clubs or organizations: Never     Relationship status:      Intimate partner violence     Fear of current or ex partner: No     Emotionally abused: No     Physically abused: No     Forced sexual activity: No   Other Topics Concern      Service No     Blood Transfusions No     Caffeine Concern Yes     Occupational Exposure No     Hobby Hazards No     Sleep Concern Yes     Comment: not always well     Stress Concern No     Comment: markedly improved     Weight Concern No     Special Diet No     Back Care No     Comment: improved since surgery in 10/06     Exercise Yes     Comment: daily for 20 minutes at a time, doing some golfing and that is going well.     Bike Helmet Not Asked     Seat Belt Yes     Self-Exams Yes     Parent/sibling w/ CABG, MI or angioplasty before 65F 55M? Not Asked   Social History Narrative     Not on file       Current Outpatient Medications   Medication Sig Dispense Refill     ketorolac (TORADOL) 10 MG tablet Take 1 tablet (10 mg) by mouth every 6 hours as needed for moderate pain 20 tablet 3     oxyCODONE-acetaminophen (PERCOCET) 5-325 MG tablet Take 1 tablet by mouth every 6 hours as needed for severe pain       predniSONE (DELTASONE) 10 MG tablet Take 40 mg po daily for 4-7 days when there is a flair of muscle spasms in the low back region  "(Patient not taking: Reported on 10/27/2020) 60 tablet 1       Allergies   Allergen Reactions     No Known Drug Allergies        REVIEW OF SYSTEMS:  CONSTITUTIONAL:  NEGATIVE for fever, chills, change in weight, not feeling tired  SKIN:  NEGATIVE for worrisome rashes, no skin lumps, no skin ulcers and no non-healing wounds  EYES:  NEGATIVE for vision changes or irritation.  ENT/MOUTH:  NEGATIVE.  No hearing loss, no hoarseness, no difficulty swallowing.  RESP:  NEGATIVE. No cough or shortness of breath.  CV:  NEGATIVE for chest pain, palpitations or peripheral edema  GI:  NEGATIVE for nausea, abdominal pain, heartburn, or change in bowel habits  :  Negative. No dysuria, no hematuria  MUSCULOSKELETAL:  See HPI above  NEURO:  NEGATIVE . No headaches, no dizziness,  no numbness  ENDOCRINE:  NEGATIVE for temperature intolerance, skin/hair changes  HEME/ALLERGY/IMMUNE:  NEGATIVE for bleeding problems  PSYCHIATRIC:  NEGATIVE. no anxiety, no depression.     Exam:  Vitals: /70   Ht 1.979 m (6' 5.91\")   Wt 79.7 kg (175 lb 11.2 oz)   BMI 20.35 kg/m    BMI= Body mass index is 20.35 kg/m .  Constitutional:  healthy, alert and no distress  Neuro: Alert and Oriented x 3, Sensation grossly WNL.  Psych: Affect normal   Respiratory: Breathing not labored.  Cardiovascular: normal peripheral pulses  Lymph: no adenopathy  Skin: No rashes,worrisome lesions or skin problems  He has pain with movements of the left shoulder overhead and and rotation.  He is not frozen however.  Both shoulders show weakness in external rotation and abduction especially out at 90 degrees but also at his side.  The left is more painful with resisted abduction and external rotation.  Neither shoulder shock causes pain with resisted internal rotation and he has good strength of this.  He does have some tenderness in the subacromial space on the left negative on the right.  There is no tenderness on the acromioclavicular joint.  Sensation, motor and " circulation are intact.    Assessment:  Chronic left complete rotator cuff tear.  This is been present for 1 year and is most likely unrepairable at this point given its size and retraction previously.  The acute pain may be further tearing or may be a small crack of the tuberosity.  If the cuff is already unrepairable, this new injury would not influence our treatment.  I discussed possibility of repeating MRI scan, but most likely this would not influence our treatment.  I have recommended symptomatic treatment at this time giving time for this to recover.  If pain is worse in the future a reverse total shoulder arthroplasty could be performed.  We could also try steroid injection as a first step.  I would wait about a month to see if this is an acute fracture and allow it to heal.  If he has persistent pain in the month we should see back with repeat x-ray of the left shoulder and plan for steroid injection

## 2020-10-27 NOTE — TELEPHONE ENCOUNTER
Reason for Call:  Other appointment    Detailed comments: patient calling is having issues with his left shoulder. Patient saw Dr. Aden today in speciality. Provider wants to do a total shoulder surgery. Patient would like to see Dr. Lane for a second opinion. Dr. Lane's  next available appointment is 12/24/20. Patient doesn't want to wait that long to be seen. Patient would like to know if provider would work him in for a F2F or do a video visit or just a phone call to discuss. Please call to advise     Phone Number Patient can be reached at: Cell number on file:    Telephone Information:   Mobile [phone]     Best Time: any    Can we leave a detailed message on this number? YES    Call taken on 10/27/2020 at 9:29 AM by Noelle Pierre

## 2020-10-28 NOTE — TELEPHONE ENCOUNTER
I looked at Dr. Aden's note.  Because his rotator cuff has been torn it is beyond repair at this time.  Doing another MRI scan would not give us much more information and it did not think it would influence his treatment options.  It would be worth trying a steroid injection to see if he gets some relief from that.  Dr. Aden or one of the PAs that work in the orthopedic clinic can do those injections quite easily.  I guess I would start with that and if his pain does not get any better the total shoulder arthroplasty is really the only option that he has unless he wants to live with chronic pain.  I know he lives with chronic pain in other areas of his body but the shoulder replacements are actually quite successful.  I do not have any thing available for visits this week or next just due to our tight schedules and lack of time.  If he wants to try to do 1 of those injections then I would start there.  Therapy really is not going to help at this time either.  His choices are quite limited due to the extent of the injury and the chronicity of it.    Electronically signed by:  Francis Lane M.D.  10/28/2020

## 2021-01-07 ENCOUNTER — TELEPHONE (OUTPATIENT)
Dept: FAMILY MEDICINE | Facility: CLINIC | Age: 61
End: 2021-01-07

## 2021-01-07 NOTE — TELEPHONE ENCOUNTER
I called patient and told him the forms we got previous looked like Disability forms and that Dr Lane doesn't do long term disability forms. He said this was for a insurance thing and said that if its only $25 a month and if we wont do then we forget it. Then hung up. Looked in patients chart and looks like Dr Lane did the insurance form and it was faxed back around 12/21/2020. I did fax the forms again for patient and called and apologized to the patient about the misunderstanding.   YASMIN/MA

## 2021-01-07 NOTE — TELEPHONE ENCOUNTER
Reason for Call:  Other     Detailed comments: Wife states that they have tried several times to fax his long-term disability paperwork from [employer] and have never heard back.  She states they cut him off in October.  She refaxed this yesterday and wants to make sure that you got this.  She would like to have Nataliia call her.    Phone Number Patient can be reached at: Home number on file [phone] (home)    Best Time: any    Can we leave a detailed message on this number? YES    Call taken on 1/7/2021 at 10:08 AM by Hari Burt

## 2021-05-17 ENCOUNTER — OFFICE VISIT (OUTPATIENT)
Dept: FAMILY MEDICINE | Facility: CLINIC | Age: 61
End: 2021-05-17
Payer: COMMERCIAL

## 2021-05-17 ENCOUNTER — IMMUNIZATION (OUTPATIENT)
Dept: FAMILY MEDICINE | Facility: CLINIC | Age: 61
End: 2021-05-17
Payer: COMMERCIAL

## 2021-05-17 VITALS
WEIGHT: 166 LBS | TEMPERATURE: 98.5 F | HEART RATE: 75 BPM | BODY MASS INDEX: 19.23 KG/M2 | OXYGEN SATURATION: 98 % | SYSTOLIC BLOOD PRESSURE: 122 MMHG | RESPIRATION RATE: 16 BRPM | DIASTOLIC BLOOD PRESSURE: 64 MMHG

## 2021-05-17 DIAGNOSIS — M79.2 NEUROPATHIC PAIN SYNDROME (NON-HERPETIC): ICD-10-CM

## 2021-05-17 DIAGNOSIS — G47.09 OTHER INSOMNIA: ICD-10-CM

## 2021-05-17 DIAGNOSIS — R06.83 SNORING: ICD-10-CM

## 2021-05-17 DIAGNOSIS — F10.930 ALCOHOL WITHDRAWAL SYNDROME WITHOUT COMPLICATION (H): ICD-10-CM

## 2021-05-17 DIAGNOSIS — F10.90 HEAVY ALCOHOL CONSUMPTION: ICD-10-CM

## 2021-05-17 DIAGNOSIS — G89.29 CHRONIC MIDLINE LOW BACK PAIN WITH SCIATICA, SCIATICA LATERALITY UNSPECIFIED: ICD-10-CM

## 2021-05-17 DIAGNOSIS — M54.40 CHRONIC MIDLINE LOW BACK PAIN WITH SCIATICA, SCIATICA LATERALITY UNSPECIFIED: ICD-10-CM

## 2021-05-17 LAB
ALBUMIN SERPL-MCNC: 4.3 G/DL (ref 3.4–5)
ALP SERPL-CCNC: 85 U/L (ref 40–150)
ALT SERPL W P-5'-P-CCNC: 33 U/L (ref 0–70)
AST SERPL W P-5'-P-CCNC: 33 U/L (ref 0–45)
BILIRUB DIRECT SERPL-MCNC: 0.2 MG/DL (ref 0–0.2)
BILIRUB SERPL-MCNC: 1.2 MG/DL (ref 0.2–1.3)
GGT SERPL-CCNC: 25 U/L (ref 0–75)
PROT SERPL-MCNC: 7.3 G/DL (ref 6.8–8.8)

## 2021-05-17 PROCEDURE — 36415 COLL VENOUS BLD VENIPUNCTURE: CPT | Performed by: FAMILY MEDICINE

## 2021-05-17 PROCEDURE — 99214 OFFICE O/P EST MOD 30 MIN: CPT | Performed by: FAMILY MEDICINE

## 2021-05-17 PROCEDURE — 91301 PR COVID VAC MODERNA 100 MCG/0.5 ML IM: CPT

## 2021-05-17 PROCEDURE — 0011A PR COVID VAC MODERNA 100 MCG/0.5 ML IM: CPT

## 2021-05-17 PROCEDURE — 82977 ASSAY OF GGT: CPT | Performed by: FAMILY MEDICINE

## 2021-05-17 PROCEDURE — 80076 HEPATIC FUNCTION PANEL: CPT | Performed by: FAMILY MEDICINE

## 2021-05-17 RX ORDER — KETOROLAC TROMETHAMINE 10 MG/1
10 TABLET, FILM COATED ORAL EVERY 6 HOURS PRN
Qty: 20 TABLET | Refills: 3 | Status: SHIPPED | OUTPATIENT
Start: 2021-05-17 | End: 2021-09-27

## 2021-05-17 RX ORDER — PREDNISONE 10 MG/1
TABLET ORAL
Qty: 60 TABLET | Refills: 1 | Status: SHIPPED | OUTPATIENT
Start: 2021-05-17 | End: 2022-04-18

## 2021-05-17 RX ORDER — TEMAZEPAM 15 MG/1
15 CAPSULE ORAL
Qty: 30 CAPSULE | Refills: 5 | Status: SHIPPED | OUTPATIENT
Start: 2021-05-17 | End: 2022-11-01

## 2021-05-17 ASSESSMENT — PAIN SCALES - GENERAL: PAINLEVEL: SEVERE PAIN (6)

## 2021-05-17 NOTE — PROGRESS NOTES
Assessment & Plan     (M54.40,  G89.29) Chronic midline low back pain with sciatica, sciatica laterality unspecified  (M79.2) Neuropathic pain syndrome (non-herpetic)  Comment: Patient well-known to me with chronic low back pain and neuropathic pain from nerve injury.  His pain is always present.  He uses his Toradol and prednisone only when he has a flare.  He typically gets refills once or twice a year.  Plan: ketorolac (TORADOL) 10 MG tablet, predniSONE         (DELTASONE) 10 MG tablet        Refills of his pain meds were given.    (G47.09) Other insomnia  Comment: He is not sleeping well and is using alcohol to try to get himself to sleep at night.  He has been drinking quite heavily and his wife sent a note with him for me to read.  She is very concerned about his drinking and actually mentions to me the last time she was in.  Plan: temazepam (RESTORIL) 15 MG capsule        He assured me that he is not can use the fireball at night at all anymore.  He is going to try to cut back on his other alcohol and will try Restoril 50 mg at bedtime to see if we can get him to sleep better.    (Z78.9) Heavy alcohol consumption  Comment: He is drinking upwards of 12 beers a day on the weekends and anywhere from 3 to 10/day on other days.  Is also been doing 2 or 3 shots of fireball prior to going to bed at night to try to sleep better.  Plan: **Hepatic panel FUTURE 2mo, GGT        He did agree to having his liver enzymes checked again today and those are all normal including his GGT.  I told him that if things are normal this would be good unless he is done so much damage to the liver that he does not have a lot of liver function left.  I do not think this is true because his protein levels are all normal and there is absolutely no abnormality on any of his hepatic panel.  He admits that he probably is an alcoholic and has been drinking for 20+ years and quite heavily in the last 10 years.  He assured me that he is in a  try to cut back.    (R06.83) Snoring  Comment: In his wife's note he also was noted to be a heavy snorer and feels as though he is stopping breathing at night.  Plan: SLEEP EVALUATION & MANAGEMENT REFERRAL - [facility]         [phone] (Age 13 and up if over 100 lbs)        We will place a referral for a sleep evaluation.            25 minutes spent on the date of the encounter doing chart review, history and exam, documentation and further activities per the note       Tobacco Cessation:   reports that he has been smoking cigarettes. He has a 45.00 pack-year smoking history. He has never used smokeless tobacco.  Tobacco Cessation Action Plan: Information offered: Patient not interested at this time        No follow-ups on file.    Francis Lane MD, MD  [facility]    Petrona Dyer is a 60 year old who presents for the following health issues     HPI     Chief Complaint   Patient presents with     Recheck Medication     Pain     He is in pain daily and now is having even worsening shoulder pain due to a complete rotator cuff tear.  The orthopedic surgeon said that it is too far gone to repair and that he would need a joint replacement.    His wife gave him a note to give to me and in it she states that he is drinking daily and sneaking alcohol and he does admit to this.  She also is concerned about his snoring and what appears to be sleep apnea.  She is very stressed and feels like she is leaving her to make all the decisions in life because he is too busy trying to deal with his pain and drinking too heavily to make a bottle of decisions.  He drinks to get himself to sleep at night and he does admit to this.  Even though he takes 2 or 3 shots of fireball he still wakes up 2 or 3 times at night.  He admits that he probably has an alcohol issue but does not feel like he can quit drinking altogether.  He will try to cut back particular with  the fireball at nighttime.  We talked about using something else to help him sleep at night.    Review of Systems   Constitutional, HEENT, cardiovascular, pulmonary, gi and gu systems are negative, except as otherwise noted.      Objective    Pulse 75   Temp 98.5  F (36.9  C) (Temporal)   Wt 75.3 kg (166 lb)   SpO2 98%   BMI 19.23 kg/m    Body mass index is 19.23 kg/m .  Physical Exam   GENERAL: healthy, alert, no distress.  He is got no scleral icterus no bronzing of the skin that would suggest liver disease.  He does look older than his stated age but he has been drinking heavily for years and smokes a pack to a pack and a half a day.  NECK: no adenopathy, no asymmetry, masses, or scars and thyroid normal to palpation  RESP: lungs clear to auscultation - no rales, rhonchi or wheezes  CV: regular rate and rhythm, normal S1 S2, no S3 or S4, no murmur, click or rub, no peripheral edema and peripheral pulses strong  ABDOMEN: soft, nontender, no hepatosplenomegaly, no masses and bowel sounds normal  MS: no gross musculoskeletal defects noted, no edema    Results for orders placed or performed in visit on 05/17/21   **Hepatic panel FUTURE 2mo     Status: None   Result Value Ref Range    Bilirubin Direct 0.2 0.0 - 0.2 mg/dL    Bilirubin Total 1.2 0.2 - 1.3 mg/dL    Albumin 4.3 3.4 - 5.0 g/dL    Protein Total 7.3 6.8 - 8.8 g/dL    Alkaline Phosphatase 85 40 - 150 U/L    ALT 33 0 - 70 U/L    AST 33 0 - 45 U/L   GGT     Status: None   Result Value Ref Range    GGT 25 0 - 75 U/L

## 2021-05-18 PROBLEM — F10.930 ALCOHOL WITHDRAWAL SYNDROME WITHOUT COMPLICATION (H): Status: ACTIVE | Noted: 2021-05-18

## 2021-05-29 ENCOUNTER — TRANSFERRED RECORDS (OUTPATIENT)
Dept: HEALTH INFORMATION MANAGEMENT | Facility: CLINIC | Age: 61
End: 2021-05-29

## 2021-06-14 ENCOUNTER — IMMUNIZATION (OUTPATIENT)
Dept: FAMILY MEDICINE | Facility: CLINIC | Age: 61
End: 2021-06-14
Attending: FAMILY MEDICINE
Payer: COMMERCIAL

## 2021-06-14 PROCEDURE — 0012A PR COVID VAC MODERNA 100 MCG/0.5 ML IM: CPT

## 2021-06-14 PROCEDURE — 91301 PR COVID VAC MODERNA 100 MCG/0.5 ML IM: CPT

## 2021-08-02 ENCOUNTER — OFFICE VISIT (OUTPATIENT)
Dept: ORTHOPEDICS | Facility: CLINIC | Age: 61
End: 2021-08-02
Payer: OTHER MISCELLANEOUS

## 2021-08-02 VITALS
BODY MASS INDEX: 19.32 KG/M2 | WEIGHT: 167 LBS | HEIGHT: 78 IN | DIASTOLIC BLOOD PRESSURE: 82 MMHG | SYSTOLIC BLOOD PRESSURE: 127 MMHG

## 2021-08-02 DIAGNOSIS — M25.512 ACUTE PAIN OF LEFT SHOULDER: Primary | ICD-10-CM

## 2021-08-02 DIAGNOSIS — S46.012D TRAUMATIC COMPLETE TEAR OF LEFT ROTATOR CUFF, SUBSEQUENT ENCOUNTER: ICD-10-CM

## 2021-08-02 PROCEDURE — 99213 OFFICE O/P EST LOW 20 MIN: CPT | Performed by: ORTHOPAEDIC SURGERY

## 2021-08-02 ASSESSMENT — MIFFLIN-ST. JEOR: SCORE: 1690.76

## 2021-08-02 NOTE — LETTER
8/2/2021         RE: Gomez Gutierrez  [address]  [place] [address]        Dear Colleague,    Thank you for referring your patient, Gomez Gutierrez, to the [facility]. Please see a copy of my visit note below.    ORTHOPEDIC CONSULT      Chief Complaint: Gomez Gutierrez is a 61 year old right hand dominant male who not working      Chronic left shoulder pain due to multiple falls.       History of Present Illness:     Onset: 2 years(s) ago. Patient describes injury as 2 separate falls on left shoulder almost 2 years and and then one year ago. He reports chronic left shoulder pain. He was previously seen by Dr Aden on 10/27/20. Previous imaging of left shoulder reviled a retracted rotator cuff tear and possible tuberosity fracture. No new imaging in the last 9 months. He reports pain and diminished strength in the left shoulder. He has difficulty reaching, lifting and movement overhead. He is using an arm sling to rest his shoulder. He contributes the falls to lower back nerve damage sustained at work several years ago causing drop foot which in turn causes falling and tripping.  Location of Pain: left shoulder  Worsened by: reaching, lifting, and overhead movement with the left arm  Better with: rest  Treatments tried: rest/activity avoidance, Toradol,   Associated symptoms: weakness of left shoulder    Physical Exam:  Left upper extremity: The patient has tenderness palpation over his lateral and posterior shoulder.  No tenderness in the bicipital groove or acromioclavicular joint.  No tenderness in the anterior or superior shoulder.  He does have extreme pain past about 45 degrees of abduction and forward flexion.  He has good pushoff strength and internal rotation.  Distal motor and sensory examinations grossly intact.        Impression: Left rotator cuff tear, chronic    Plan:  All of the above pertinent physical exam and imaging modalities findings was reviewed.  I had a  discussion about whether to proceed with a steroid injection or whether to go ahead with an MRI.  After further discussion he has been having pain in his left shoulder which has been moderate at times since the time of injury.  He is just been learning to live with it.  He would really like to get this taken care of so he can get rid of the pain in his shoulder.  We will proceed with an MRI of the left shoulder and then see him back in the clinic to discuss her options.            BP Readings from Last 1 Encounters:   05/17/21 122/64                 Again, thank you for allowing me to participate in the care of your patient.        Sincerely,        Fercho Guerin, DO

## 2021-08-02 NOTE — PROGRESS NOTES
ORTHOPEDIC CONSULT      Chief Complaint: Gomez Gutierrez is a 61 year old right hand dominant male who not working      Chronic left shoulder pain due to multiple falls.       History of Present Illness:     Onset: 2 years(s) ago. Patient describes injury as 2 separate falls on left shoulder almost 2 years and and then one year ago. He reports chronic left shoulder pain. He was previously seen by Dr Aden on 10/27/20. Previous imaging of left shoulder reviled a retracted rotator cuff tear and possible tuberosity fracture. No new imaging in the last 9 months. He reports pain and diminished strength in the left shoulder. He has difficulty reaching, lifting and movement overhead. He is using an arm sling to rest his shoulder. He contributes the falls to lower back nerve damage sustained at work several years ago causing drop foot which in turn causes falling and tripping.  Location of Pain: left shoulder  Worsened by: reaching, lifting, and overhead movement with the left arm  Better with: rest  Treatments tried: rest/activity avoidance, Toradol,   Associated symptoms: weakness of left shoulder    Physical Exam:  Left upper extremity: The patient has tenderness palpation over his lateral and posterior shoulder.  No tenderness in the bicipital groove or acromioclavicular joint.  No tenderness in the anterior or superior shoulder.  He does have extreme pain past about 45 degrees of abduction and forward flexion.  He has good pushoff strength and internal rotation.  Distal motor and sensory examinations grossly intact.        Impression: Left rotator cuff tear, chronic    Plan:  All of the above pertinent physical exam and imaging modalities findings was reviewed.  I had a discussion about whether to proceed with a steroid injection or whether to go ahead with an MRI.  After further discussion he has been having pain in his left shoulder which has been moderate at times since the time of injury.  He is just been  learning to live with it.  He would really like to get this taken care of so he can get rid of the pain in his shoulder.  We will proceed with an MRI of the left shoulder and then see him back in the clinic to discuss her options.            BP Readings from Last 1 Encounters:   05/17/21 122/64

## 2021-08-05 ENCOUNTER — HOSPITAL ENCOUNTER (OUTPATIENT)
Dept: MRI IMAGING | Facility: CLINIC | Age: 61
Discharge: HOME | End: 2021-08-05
Attending: ORTHOPAEDIC SURGERY | Admitting: ORTHOPAEDIC SURGERY
Payer: OTHER MISCELLANEOUS

## 2021-08-05 DIAGNOSIS — M25.512 ACUTE PAIN OF LEFT SHOULDER: ICD-10-CM

## 2021-08-05 DIAGNOSIS — S46.012D TRAUMATIC COMPLETE TEAR OF LEFT ROTATOR CUFF, SUBSEQUENT ENCOUNTER: ICD-10-CM

## 2021-08-05 PROCEDURE — 73221 MRI JOINT UPR EXTREM W/O DYE: CPT | Mod: 26 | Performed by: RADIOLOGY

## 2021-08-05 PROCEDURE — 73221 MRI JOINT UPR EXTREM W/O DYE: CPT | Mod: LT

## 2021-08-06 ENCOUNTER — OFFICE VISIT (OUTPATIENT)
Dept: ORTHOPEDICS | Facility: CLINIC | Age: 61
End: 2021-08-06
Payer: OTHER MISCELLANEOUS

## 2021-08-06 ENCOUNTER — TELEPHONE (OUTPATIENT)
Dept: ORTHOPEDICS | Facility: CLINIC | Age: 61
End: 2021-08-06

## 2021-08-06 VITALS
HEIGHT: 78 IN | WEIGHT: 167 LBS | BODY MASS INDEX: 19.32 KG/M2 | DIASTOLIC BLOOD PRESSURE: 80 MMHG | SYSTOLIC BLOOD PRESSURE: 128 MMHG

## 2021-08-06 DIAGNOSIS — Z11.59 ENCOUNTER FOR SCREENING FOR OTHER VIRAL DISEASES: ICD-10-CM

## 2021-08-06 DIAGNOSIS — S46.012D TRAUMATIC COMPLETE TEAR OF LEFT ROTATOR CUFF, SUBSEQUENT ENCOUNTER: ICD-10-CM

## 2021-08-06 DIAGNOSIS — M25.512 ACUTE PAIN OF LEFT SHOULDER: Primary | ICD-10-CM

## 2021-08-06 DIAGNOSIS — M75.22 BICIPITAL TENDONITIS OF LEFT SHOULDER: ICD-10-CM

## 2021-08-06 PROBLEM — S46.012A TRAUMATIC COMPLETE TEAR OF LEFT ROTATOR CUFF: Status: ACTIVE | Noted: 2020-10-27

## 2021-08-06 PROCEDURE — 99214 OFFICE O/P EST MOD 30 MIN: CPT | Performed by: ORTHOPAEDIC SURGERY

## 2021-08-06 RX ORDER — CEFAZOLIN SODIUM 2 G/100ML
2 INJECTION, SOLUTION INTRAVENOUS
Status: CANCELLED | OUTPATIENT
Start: 2021-08-06

## 2021-08-06 RX ORDER — CEFAZOLIN SODIUM 2 G/100ML
2 INJECTION, SOLUTION INTRAVENOUS SEE ADMIN INSTRUCTIONS
Status: CANCELLED | OUTPATIENT
Start: 2021-08-06

## 2021-08-06 ASSESSMENT — MIFFLIN-ST. JEOR: SCORE: 1690.76

## 2021-08-06 NOTE — LETTER
8/6/2021         RE: Gomez Gutierrez  [address]  [facility] [address]        Dear Colleague,    Thank you for referring your patient, Gomez Gutierrez, to the [facility]. Please see a copy of my visit note below.    ORTHOPEDIC CONSULT      Chief Complaint: Gomez Gutierrez is a 61 year old right hand dominant male who is not working.        Chief Complaints and History of Present Illnesses   Patient presents with     Left Shoulder - Follow Up             History of Present Illness:     Patient has completed an MRI of the left shoulder. He reports no change in his symptoms. Pain with use of the left arm and shoulder especially with lifting and overhead use  Location of Pain: left shoulder  Worsened by: reaching, lifting, and overhead movement   Better with: rest  Treatments tried: rest/activity avoidance and other medications: Tramadol (Ultram)  Associated symptoms: weakness of left shoulder    Physical Exam:    Left upper extremity: The patient has tenderness palpation over his lateral and posterior shoulder.  No tenderness in the bicipital groove or acromioclavicular joint.  No tenderness in the anterior or superior shoulder.  He does have extreme pain past about 45 degrees of abduction and forward flexion.  He has good pushoff strength and internal rotation.  Distal motor and sensory examinations grossly intact.    Diagnostic Modalities:    Independent visualization of the images was performed.  I personally interpreted the imaging studies.    Impression: MRI obtained yesterday does show similar appearance to the supraspinatus tear with 2.1 cm of retraction.  There is still biceps tendinosis intra-articularly with intrasubstance tearing.  There is also a possible posterior superior labral tear.  There is partial and full-thickness tearing from the posterior supraspinatus into the infraspinatus.    Plan:  All of the above pertinent physical exam and imaging modalities findings was  reviewed.  I advised the patient that I would recommend left shoulder arthroscopy with open rotator cuff repair.  I did explain the risks and goals of the procedure to the patient.  I explained that he would be in a sling postoperatively until follow-up.  We would then proceed with physical therapy which would last the following 10 weeks.  We will try to get this scheduled for next Wednesday.            BP Readings from Last 1 Encounters:   08/06/21 128/80                 Again, thank you for allowing me to participate in the care of your patient.        Sincerely,        Fercho Guerin, DO

## 2021-08-06 NOTE — PROGRESS NOTES
ORTHOPEDIC CONSULT      Chief Complaint: Gomez Gutierrez is a 61 year old right hand dominant male who is not working.        Chief Complaints and History of Present Illnesses   Patient presents with     Left Shoulder - Follow Up             History of Present Illness:     Patient has completed an MRI of the left shoulder. He reports no change in his symptoms. Pain with use of the left arm and shoulder especially with lifting and overhead use  Location of Pain: left shoulder  Worsened by: reaching, lifting, and overhead movement   Better with: rest  Treatments tried: rest/activity avoidance and other medications: Tramadol (Ultram)  Associated symptoms: weakness of left shoulder    Physical Exam:    Left upper extremity: The patient has tenderness palpation over his lateral and posterior shoulder.  No tenderness in the bicipital groove or acromioclavicular joint.  No tenderness in the anterior or superior shoulder.  He does have extreme pain past about 45 degrees of abduction and forward flexion.  He has good pushoff strength and internal rotation.  Distal motor and sensory examinations grossly intact.    Diagnostic Modalities:    Independent visualization of the images was performed.  I personally interpreted the imaging studies.    Impression: MRI obtained yesterday does show similar appearance to the supraspinatus tear with 2.1 cm of retraction.  There is still biceps tendinosis intra-articularly with intrasubstance tearing.  There is also a possible posterior superior labral tear.  There is partial and full-thickness tearing from the posterior supraspinatus into the infraspinatus.    Plan:  All of the above pertinent physical exam and imaging modalities findings was reviewed.  I advised the patient that I would recommend left shoulder arthroscopy with open rotator cuff repair.  I did explain the risks and goals of the procedure to the patient.  I explained that he would be in a sling postoperatively until  follow-up.  We would then proceed with physical therapy which would last the following 10 weeks.  We will try to get this scheduled for next Wednesday.            BP Readings from Last 1 Encounters:   08/06/21 128/80

## 2021-08-09 ENCOUNTER — OFFICE VISIT (OUTPATIENT)
Dept: FAMILY MEDICINE | Facility: CLINIC | Age: 61
End: 2021-08-09
Payer: COMMERCIAL

## 2021-08-09 VITALS
OXYGEN SATURATION: 98 % | BODY MASS INDEX: 19.13 KG/M2 | WEIGHT: 165.56 LBS | TEMPERATURE: 98.1 F | SYSTOLIC BLOOD PRESSURE: 122 MMHG | RESPIRATION RATE: 18 BRPM | HEART RATE: 77 BPM | DIASTOLIC BLOOD PRESSURE: 70 MMHG

## 2021-08-09 DIAGNOSIS — S46.012D TRAUMATIC COMPLETE TEAR OF LEFT ROTATOR CUFF, SUBSEQUENT ENCOUNTER: ICD-10-CM

## 2021-08-09 DIAGNOSIS — Z01.818 PREOP GENERAL PHYSICAL EXAM: Primary | ICD-10-CM

## 2021-08-09 PROCEDURE — 99214 OFFICE O/P EST MOD 30 MIN: CPT | Performed by: FAMILY MEDICINE

## 2021-08-09 RX ORDER — LATANOPROST 50 UG/ML
SOLUTION/ DROPS OPHTHALMIC
COMMUNITY
Start: 2021-04-13

## 2021-08-09 ASSESSMENT — PAIN SCALES - GENERAL: PAINLEVEL: WORST PAIN (10)

## 2021-08-09 NOTE — PATIENT INSTRUCTIONS

## 2021-08-09 NOTE — H&P (VIEW-ONLY)
[address] [address]  Phone: [phone]  Fax: [phone]  Primary Provider: Francis Lane        PREOPERATIVE EVALUATION:  Today's date: 8/9/2021    Gomez Gutierrez is a 61 year old male who presents for a preoperative evaluation.    Surgical Information:  Surgery/Procedure: Left Rotator cuff repair  Surgery Location: [facility]   Surgeon: Truong  Surgery Date: 08/11/2021  Time of Surgery: 10:30am  Where patient plans to recover: At home with family  Fax number for surgical facility: Note does not need to be faxed, will be available electronically in Epic.    Type of Anesthesia Anticipated: General    Assessment & Plan     The proposed surgical procedure is considered INTERMEDIATE risk.    Preop general physical exam      Traumatic complete tear of left rotator cuff, subsequent encounter                     RECOMMENDATION:  APPROVAL GIVEN to proceed with proposed procedure, without further diagnostic evaluation.    Review of external notes as documented above                   Subjective     HPI related to upcoming procedure: Trouble with rotator cuff of his left shoulder needs repair.    Preop Questions 8/9/2021   1. Have you ever had a heart attack or stroke? No   2. Have you ever had surgery on your heart or blood vessels, such as a stent placement, a coronary artery bypass, or surgery on an artery in your head, neck, heart, or legs? No   3. Do you have chest pain with activity? No   4. Do you have a history of  heart failure? No   5. Do you currently have a cold, bronchitis or symptoms of other infection? No   6. Do you have a cough, shortness of breath, or wheezing? No   7. Do you or anyone in your family have previous history of blood clots? No   8. Do you or does anyone in your family have a serious bleeding problem such as prolonged bleeding following surgeries or cuts? No   9. Have you ever had problems with anemia or been told to take  iron pills? No   10. Have you had any abnormal blood loss such as black, tarry or bloody stools? No   11. Have you ever had a blood transfusion? No   12. Are you willing to have a blood transfusion if it is medically needed before, during, or after your surgery? Yes   13. Have you or any of your relatives ever had problems with anesthesia? No   14. Do you have sleep apnea, excessive snoring or daytime drowsiness? No   15. Do you have any artifical heart valves or other implanted medical devices like a pacemaker, defibrillator, or continuous glucose monitor? No   16. Do you have artificial joints? No   17. Are you allergic to latex? No       Health Care Directive:  Patient does not have a Health Care Directive or Living Will:     Preoperative Review of :   reviewed - controlled substances reflected in medication list.          Review of Systems  Constitutional, neuro, ENT, endocrine, pulmonary, cardiac, gastrointestinal, genitourinary, musculoskeletal, integument and psychiatric systems are negative, except as otherwise noted.    Patient Active Problem List    Diagnosis Date Noted     Bicipital tendonitis of left shoulder 08/06/2021     Priority: Medium     Added automatically from request for surgery [id number]       Alcohol withdrawal syndrome without complication (H) 05/18/2021     Priority: Medium     Traumatic complete tear of left rotator cuff 10/27/2020     Priority: Medium     Acute medial meniscus tear of right knee, subsequent encounter 01/28/2020     Priority: Medium     Discoloration of skin of hand (bilateral) hands turn white then red and purple when cold with burning sensation 01/17/2019     Priority: Medium     Back muscle spasm 01/17/2019     Priority: Medium     Personal history of nicotine dependence  12/05/2018     Priority: Medium     Glaucoma suspect, bilateral 12/05/2018     Priority: Medium     Chronic midline low back pain with sciatica, sciatica laterality unspecified 05/16/2017      Priority: Medium     Lumbar radiculopathy 08/17/2016     Priority: Medium     Foot drop, left 08/30/2012     Priority: Medium     CARDIOVASCULAR SCREENING; LDL GOAL LESS THAN 160 10/31/2010     Priority: Medium     Neuropathic pain syndrome (non-herpetic) 04/08/2009     Priority: Medium     Obstructive sleep apnea 03/03/2007     Priority: Medium     Problem list name updated by automated process. Provider to review       iamLUMBAR DISC DISPLACEMENT 11/20/2006     Priority: Medium     iamPOSTSURGICAL STATES NEC 11/20/2006     Priority: Medium     Other male erectile dysfunction 08/04/2006     Priority: Medium     Tobacco use disorder 05/31/2002     Priority: Medium     Psychosexual dysfunction with inhibited sexual excitement 05/31/2002     Priority: Medium      Past Medical History:   Diagnosis Date     Arthritis      Lumbago      Major depression in complete remission (H) 8/30/2012     Severe Depression [296.33] 6/18/2009     Sleep apnea      Tobacco use disorder      Past Surgical History:   Procedure Laterality Date     ARTHROSCOPY KNEE WITH MEDIAL MENISCECTOMY Right 2/24/2020    Procedure: ARTHROSCOPY, KNEE, WITH MEDIAL MENISCECTOMY right;  Surgeon: Eliot Bañuelos MD;  Location:  OR     BACK SURGERY  11/2015    cleaned out bone spurs     C ECHO HEART XTHORACIC,COMPLETE, W/O DOPPLER  02/04/07    normal cardiac stress echo test     C ESTEBAN W/O FACETEC FORAMOT/DSKC 1/2 VRT SEG, LUMBAR  1/02    L4-5     C REPAIR LUMBAR HERNIA  1986    L5-4 L5-S1     COLONOSCOPY N/A 2/18/2020    Procedure: Colonoscopy;  Surgeon: Juan Beltrán DO;  Location:  GI     HC REMOVE TONSILS/ADENOIDS,<13 Y/O      T & A <11y.o.     LAMINECT/DISCECTOMY, LUMBAR  10/19/06    L3-4     LAMINECT/DISCECTOMY, LUMBAR  11/10    left sided L4-5     REMOVAL OF SPERM DUCT(S)      Vasectomy     ROTATOR CUFF REPAIR RT/LT  3/12    right sided     ZZHC COLONOSCOPY W BIOPSY  11/05/09     Current Outpatient Medications   Medication Sig  Dispense Refill     ketorolac (TORADOL) 10 MG tablet Take 1 tablet (10 mg) by mouth every 6 hours as needed for moderate pain 20 tablet 3     predniSONE (DELTASONE) 10 MG tablet Take 40 mg po daily for 4-7 days when there is a flair of muscle spasms in the low back region 60 tablet 1     temazepam (RESTORIL) 15 MG capsule Take 1 capsule (15 mg) by mouth nightly as needed for sleep 30 capsule 5     latanoprost (XALATAN) 0.005 % ophthalmic solution INSTILL 1 DROP INTO EACH EYE EVERY DAY AT BEDTIME       oxyCODONE-acetaminophen (PERCOCET) 5-325 MG tablet Take 1 tablet by mouth every 6 hours as needed for severe pain (Patient not taking: Reported on 2021)         Allergies   Allergen Reactions     No Known Drug Allergies         Social History     Tobacco Use     Smoking status: Current Every Day Smoker     Packs/day: 1.00     Years: 45.00     Pack years: 45.00     Types: Cigarettes     Smokeless tobacco: Never Used     Tobacco comment:  wants to quit   Substance Use Topics     Alcohol use: Yes     Alcohol/week: 6.0 standard drinks     Types: 6 Cans of beer per week     Comment: 4-6 per day     Family History   Problem Relation Age of Onset     Arthritis Father      Depression Father      Heart Failure Father 80        CHF     Respiratory Mother         emphysema     Diabetes Mother      Other - See Comments Brother 21         in an MVA (oldest sibling     Other - See Comments Brother         older  all other siblings are 1/2      Other - See Comments Brother         older     Heart Disease Brother      Other - See Comments Brother         older     Other - See Comments Brother         older     Other - See Comments Brother         younger     Other - See Comments Brother 21        motorcycle injury, Brain death     Other - See Comments Brother         full sibling but given up for adoption 1 yr older     Other - See Comments Grandchild         3 grand daughters 1 grand son     History   Drug Use     Types:  Marijuana         Objective     /70   Pulse 77   Temp 98.1  F (36.7  C) (Temporal)   Resp 18   Wt 75.1 kg (165 lb 9 oz)   SpO2 98%   BMI 19.13 kg/m      Physical Exam    GENERAL APPEARANCE: healthy, alert and no distress     EYES: EOMI,  PERRL     HENT: ear canals and TM's normal and nose and mouth without ulcers or lesions     NECK: no adenopathy, no asymmetry, masses, or scars and thyroid normal to palpation     RESP: lungs clear to auscultation - no rales, rhonchi or wheezes     CV: regular rates and rhythm, normal S1 S2, no S3 or S4 and no murmur, click or rub     ABDOMEN:  soft, nontender, no HSM or masses and bowel sounds normal     MS: extremities normal- no gross deformities noted, no evidence of inflammation in joints, FROM in all extremities.     SKIN: no suspicious lesions or rashes     NEURO: Normal strength and tone, sensory exam grossly normal, mentation intact and speech normal     PSYCH: mentation appears normal. and affect normal/bright     LYMPHATICS: No cervical adenopathy    Recent Labs   Lab Test 08/03/20  1029   HGB 15.2         POTASSIUM 4.6   CR 0.83        Diagnostics:  No labs were ordered during this visit.   No EKG required, no history of coronary heart disease, significant arrhythmia, peripheral arterial disease or other structural heart disease.    Revised Cardiac Risk Index (RCRI):  The patient has the following serious cardiovascular risks for perioperative complications:   - No serious cardiac risks = 0 points     RCRI Interpretation: 0 points: Class I (very low risk - 0.4% complication rate)           Signed Electronically by: Camden Peres MD  Copy of this evaluation report is provided to requesting physician.

## 2021-08-09 NOTE — PROGRESS NOTES
[address] [address]  Phone: [phone]  Fax: [phone]  Primary Provider: Francis Lane        PREOPERATIVE EVALUATION:  Today's date: 8/9/2021    Gomez Gutierrez is a 61 year old male who presents for a preoperative evaluation.    Surgical Information:  Surgery/Procedure: Left Rotator cuff repair  Surgery Location: [facility]   Surgeon: Truong  Surgery Date: 08/11/2021  Time of Surgery: 10:30am  Where patient plans to recover: At home with family  Fax number for surgical facility: Note does not need to be faxed, will be available electronically in Epic.    Type of Anesthesia Anticipated: General    Assessment & Plan     The proposed surgical procedure is considered INTERMEDIATE risk.    Preop general physical exam      Traumatic complete tear of left rotator cuff, subsequent encounter                     RECOMMENDATION:  APPROVAL GIVEN to proceed with proposed procedure, without further diagnostic evaluation.    Review of external notes as documented above                   Subjective     HPI related to upcoming procedure: Trouble with rotator cuff of his left shoulder needs repair.    Preop Questions 8/9/2021   1. Have you ever had a heart attack or stroke? No   2. Have you ever had surgery on your heart or blood vessels, such as a stent placement, a coronary artery bypass, or surgery on an artery in your head, neck, heart, or legs? No   3. Do you have chest pain with activity? No   4. Do you have a history of  heart failure? No   5. Do you currently have a cold, bronchitis or symptoms of other infection? No   6. Do you have a cough, shortness of breath, or wheezing? No   7. Do you or anyone in your family have previous history of blood clots? No   8. Do you or does anyone in your family have a serious bleeding problem such as prolonged bleeding following surgeries or cuts? No   9. Have you ever had problems with anemia or been told to take  iron pills? No   10. Have you had any abnormal blood loss such as black, tarry or bloody stools? No   11. Have you ever had a blood transfusion? No   12. Are you willing to have a blood transfusion if it is medically needed before, during, or after your surgery? Yes   13. Have you or any of your relatives ever had problems with anesthesia? No   14. Do you have sleep apnea, excessive snoring or daytime drowsiness? No   15. Do you have any artifical heart valves or other implanted medical devices like a pacemaker, defibrillator, or continuous glucose monitor? No   16. Do you have artificial joints? No   17. Are you allergic to latex? No       Health Care Directive:  Patient does not have a Health Care Directive or Living Will:     Preoperative Review of :   reviewed - controlled substances reflected in medication list.          Review of Systems  Constitutional, neuro, ENT, endocrine, pulmonary, cardiac, gastrointestinal, genitourinary, musculoskeletal, integument and psychiatric systems are negative, except as otherwise noted.    Patient Active Problem List    Diagnosis Date Noted     Bicipital tendonitis of left shoulder 08/06/2021     Priority: Medium     Added automatically from request for surgery [id number]       Alcohol withdrawal syndrome without complication (H) 05/18/2021     Priority: Medium     Traumatic complete tear of left rotator cuff 10/27/2020     Priority: Medium     Acute medial meniscus tear of right knee, subsequent encounter 01/28/2020     Priority: Medium     Discoloration of skin of hand (bilateral) hands turn white then red and purple when cold with burning sensation 01/17/2019     Priority: Medium     Back muscle spasm 01/17/2019     Priority: Medium     Personal history of nicotine dependence  12/05/2018     Priority: Medium     Glaucoma suspect, bilateral 12/05/2018     Priority: Medium     Chronic midline low back pain with sciatica, sciatica laterality unspecified 05/16/2017      Priority: Medium     Lumbar radiculopathy 08/17/2016     Priority: Medium     Foot drop, left 08/30/2012     Priority: Medium     CARDIOVASCULAR SCREENING; LDL GOAL LESS THAN 160 10/31/2010     Priority: Medium     Neuropathic pain syndrome (non-herpetic) 04/08/2009     Priority: Medium     Obstructive sleep apnea 03/03/2007     Priority: Medium     Problem list name updated by automated process. Provider to review       iamLUMBAR DISC DISPLACEMENT 11/20/2006     Priority: Medium     iamPOSTSURGICAL STATES NEC 11/20/2006     Priority: Medium     Other male erectile dysfunction 08/04/2006     Priority: Medium     Tobacco use disorder 05/31/2002     Priority: Medium     Psychosexual dysfunction with inhibited sexual excitement 05/31/2002     Priority: Medium      Past Medical History:   Diagnosis Date     Arthritis      Lumbago      Major depression in complete remission (H) 8/30/2012     Severe Depression [296.33] 6/18/2009     Sleep apnea      Tobacco use disorder      Past Surgical History:   Procedure Laterality Date     ARTHROSCOPY KNEE WITH MEDIAL MENISCECTOMY Right 2/24/2020    Procedure: ARTHROSCOPY, KNEE, WITH MEDIAL MENISCECTOMY right;  Surgeon: Eliot Bañuelos MD;  Location:  OR     BACK SURGERY  11/2015    cleaned out bone spurs     C ECHO HEART XTHORACIC,COMPLETE, W/O DOPPLER  02/04/07    normal cardiac stress echo test     C ESTEBAN W/O FACETEC FORAMOT/DSKC 1/2 VRT SEG, LUMBAR  1/02    L4-5     C REPAIR LUMBAR HERNIA  1986    L5-4 L5-S1     COLONOSCOPY N/A 2/18/2020    Procedure: Colonoscopy;  Surgeon: Juan Beltrán DO;  Location:  GI     HC REMOVE TONSILS/ADENOIDS,<13 Y/O      T & A <11y.o.     LAMINECT/DISCECTOMY, LUMBAR  10/19/06    L3-4     LAMINECT/DISCECTOMY, LUMBAR  11/10    left sided L4-5     REMOVAL OF SPERM DUCT(S)      Vasectomy     ROTATOR CUFF REPAIR RT/LT  3/12    right sided     ZZHC COLONOSCOPY W BIOPSY  11/05/09     Current Outpatient Medications   Medication Sig  Dispense Refill     ketorolac (TORADOL) 10 MG tablet Take 1 tablet (10 mg) by mouth every 6 hours as needed for moderate pain 20 tablet 3     predniSONE (DELTASONE) 10 MG tablet Take 40 mg po daily for 4-7 days when there is a flair of muscle spasms in the low back region 60 tablet 1     temazepam (RESTORIL) 15 MG capsule Take 1 capsule (15 mg) by mouth nightly as needed for sleep 30 capsule 5     latanoprost (XALATAN) 0.005 % ophthalmic solution INSTILL 1 DROP INTO EACH EYE EVERY DAY AT BEDTIME       oxyCODONE-acetaminophen (PERCOCET) 5-325 MG tablet Take 1 tablet by mouth every 6 hours as needed for severe pain (Patient not taking: Reported on 2021)         Allergies   Allergen Reactions     No Known Drug Allergies         Social History     Tobacco Use     Smoking status: Current Every Day Smoker     Packs/day: 1.00     Years: 45.00     Pack years: 45.00     Types: Cigarettes     Smokeless tobacco: Never Used     Tobacco comment:  wants to quit   Substance Use Topics     Alcohol use: Yes     Alcohol/week: 6.0 standard drinks     Types: 6 Cans of beer per week     Comment: 4-6 per day     Family History   Problem Relation Age of Onset     Arthritis Father      Depression Father      Heart Failure Father 80        CHF     Respiratory Mother         emphysema     Diabetes Mother      Other - See Comments Brother 21         in an MVA (oldest sibling     Other - See Comments Brother         older  all other siblings are 1/2      Other - See Comments Brother         older     Heart Disease Brother      Other - See Comments Brother         older     Other - See Comments Brother         older     Other - See Comments Brother         younger     Other - See Comments Brother 21        motorcycle injury, Brain death     Other - See Comments Brother         full sibling but given up for adoption 1 yr older     Other - See Comments Grandchild         3 grand daughters 1 grand son     History   Drug Use     Types:  Marijuana         Objective     /70   Pulse 77   Temp 98.1  F (36.7  C) (Temporal)   Resp 18   Wt 75.1 kg (165 lb 9 oz)   SpO2 98%   BMI 19.13 kg/m      Physical Exam    GENERAL APPEARANCE: healthy, alert and no distress     EYES: EOMI,  PERRL     HENT: ear canals and TM's normal and nose and mouth without ulcers or lesions     NECK: no adenopathy, no asymmetry, masses, or scars and thyroid normal to palpation     RESP: lungs clear to auscultation - no rales, rhonchi or wheezes     CV: regular rates and rhythm, normal S1 S2, no S3 or S4 and no murmur, click or rub     ABDOMEN:  soft, nontender, no HSM or masses and bowel sounds normal     MS: extremities normal- no gross deformities noted, no evidence of inflammation in joints, FROM in all extremities.     SKIN: no suspicious lesions or rashes     NEURO: Normal strength and tone, sensory exam grossly normal, mentation intact and speech normal     PSYCH: mentation appears normal. and affect normal/bright     LYMPHATICS: No cervical adenopathy    Recent Labs   Lab Test 08/03/20  1029   HGB 15.2         POTASSIUM 4.6   CR 0.83        Diagnostics:  No labs were ordered during this visit.   No EKG required, no history of coronary heart disease, significant arrhythmia, peripheral arterial disease or other structural heart disease.    Revised Cardiac Risk Index (RCRI):  The patient has the following serious cardiovascular risks for perioperative complications:   - No serious cardiac risks = 0 points     RCRI Interpretation: 0 points: Class I (very low risk - 0.4% complication rate)           Signed Electronically by: Camden Peres MD  Copy of this evaluation report is provided to requesting physician.

## 2021-08-10 ENCOUNTER — ANESTHESIA EVENT (OUTPATIENT)
Dept: SURGERY | Facility: CLINIC | Age: 61
End: 2021-08-10
Payer: COMMERCIAL

## 2021-08-10 ASSESSMENT — LIFESTYLE VARIABLES: TOBACCO_USE: 1

## 2021-08-10 NOTE — ANESTHESIA PREPROCEDURE EVALUATION
Anesthesia Pre-Procedure Evaluation    Patient: Gomez Gutierrez   MRN: [id number] : 1960        Preoperative Diagnosis: Traumatic complete tear of left rotator cuff, subsequent encounter [S46.012D]  Bicipital tendonitis of left shoulder [M75.22]   Procedure : Procedure(s):  ARTHROTOMY, SHOULDER, WITH ROTATOR CUFF REPAIR.  Left shoulder arthroscopy first.     Past Medical History:   Diagnosis Date     Arthritis      Lumbago      Major depression in complete remission (H) 2012     Severe Depression [296.33] 2009     Sleep apnea      Tobacco use disorder       Past Surgical History:   Procedure Laterality Date     ARTHROSCOPY KNEE WITH MEDIAL MENISCECTOMY Right 2020    Procedure: ARTHROSCOPY, KNEE, WITH MEDIAL MENISCECTOMY right;  Surgeon: Eliot Bañuelos MD;  Location:  OR     BACK SURGERY  2015    cleaned out bone spurs     C ECHO HEART XTHORACIC,COMPLETE, W/O DOPPLER  07    normal cardiac stress echo test     C ESTEBAN W/O FACETEC FORAMOT/[facility]  VRT SEG, LUMBAR      L4-5     C REPAIR LUMBAR HERNIA      L5-4 L5-S1     COLONOSCOPY N/A 2020    Procedure: Colonoscopy;  Surgeon: Juan Beltrán DO;  Location:  GI     HC REMOVE TONSILS/ADENOIDS,<11 Y/O      T & A <11y.o.     LAMINECT/DISCECTOMY, LUMBAR  10/19/06    L3-4     LAMINECT/DISCECTOMY, LUMBAR  11/10    left sided L4-5     REMOVAL OF SPERM DUCT(S)      Vasectomy     ROTATOR CUFF REPAIR RT/LT  3/12    right sided     ZZHC COLONOSCOPY W BIOPSY  09      Allergies   Allergen Reactions     No Known Drug Allergies       Social History     Tobacco Use     Smoking status: Current Every Day Smoker     Packs/day: 1.00     Years: 45.00     Pack years: 45.00     Types: Cigarettes     Smokeless tobacco: Never Used     Tobacco comment:  wants to quit   Substance Use Topics     Alcohol use: Yes     Alcohol/week: 6.0 standard drinks     Types: 6 Cans of beer per week     Comment: 4-6 per day      Wt Readings  from Last 1 Encounters:   08/09/21 75.1 kg (165 lb 9 oz)        Anesthesia Evaluation   Pt has had prior anesthetic. Type: General and MAC.        ROS/MED HX  ENT/Pulmonary:     (+) sleep apnea, uses CPAP, tobacco use, Current use, 1 packs/day, 45  Pack-Year Hx,      Neurologic:  - neg neurologic ROS     Cardiovascular:     (+) -----Previous cardiac testing   Echo: Date: Results:    Stress Test: Date: 1/25/07 Results:  INDICATION FOR PROCEDURE:  Gomez Gutierrez is a 46-year-old patient   with atypical chest discomfort but no other significant risk factors.    Stress testing is recommended to rule out CAD as a cause of that   chest pain.       PROCEDURE:  Resting ECG shows an incomplete right bundle branch block   and is otherwise normal, heart rate is 60, resting blood pressure is   100/60.  Gomez exercised for 12 minutes and 30 seconds on a standard   Wally protocol, stopping because of fatigue and dyspnea.  He achieved   a peak heart rate of 151 which is 87% of the predicted maximum heart   rate.  His blood pressure lynsey to 164/68 giving a peak double product   of 24,750.  With exercise the patient did not develop chest   discomfort.  His ECG showed no ischemic changes and there was no   significant ventricular or supraventricular ectopy.       STRESS ECHO:  Resting echo images show normal sized left ventricle   with normal contractility.  Post-exercise images show a hyperdynamic   left ventricle.  All wall segments augment normally and the ejection   fraction improves.  This is a normal response to exercise.       CONCLUSIONS:   1.  Normal exercise ECG.   2.  Normal heart rate and blood pressure response to exercise.   3.  Above average exercise capacity for patient of this age.   4.  Normal resting and post-exercise echo images without evidence of   ischemia.  This test indicates low probability of significant   coronary artery disease.   ECG Reviewed: Date: 10/19/06 Results:  Sinus rhythm. Incomplete RBBB.  Cath:  Date: Results:      METS/Exercise Tolerance: >4 METS    Hematologic:  - neg hematologic  ROS     Musculoskeletal:   (+) arthritis,     GI/Hepatic:  - neg GI/hepatic ROS     Renal/Genitourinary:  - neg Renal ROS     Endo:  - neg endo ROS     Psychiatric/Substance Use:     (+) psychiatric history depression alcohol abuse Recreational drug usage: Cannabis (Marijuana).    Infectious Disease:  - neg infectious disease ROS     Malignancy:  - neg malignancy ROS     Other:  - neg other ROS          Physical Exam    Airway  airway exam normal      Mallampati: II   TM distance: > 3 FB   Neck ROM: full   Mouth opening: > 3 cm    Respiratory Devices and Support         Dental       (+) upper dentures and partials      Cardiovascular   cardiovascular exam normal       Rhythm and rate: regular and normal     Pulmonary   pulmonary exam normal        breath sounds clear to auscultation           OUTSIDE LABS:  CBC:   Lab Results   Component Value Date    WBC 7.2 08/03/2020    WBC 8.5 01/10/2007    HGB 15.2 08/03/2020    HGB 15.9 01/10/2007    HCT 44.4 08/03/2020    HCT 47.4 01/10/2007     08/03/2020     01/10/2007     BMP:   Lab Results   Component Value Date     08/03/2020     12/10/2018    POTASSIUM 4.6 08/03/2020    POTASSIUM 4.5 12/10/2018    CHLORIDE 110 (H) 08/03/2020    CHLORIDE 106 12/10/2018    CO2 29 08/03/2020    CO2 25 12/10/2018    BUN 14 08/03/2020    BUN 20 12/10/2018    CR 0.83 08/03/2020    CR 0.85 12/10/2018    GLC 93 08/03/2020     (H) 12/10/2018     COAGS:   Lab Results   Component Value Date    PTT 31 10/17/2006    INR 1.07 10/17/2006     POC:   Lab Results   Component Value Date     (H) 10/17/2006     HEPATIC:   Lab Results   Component Value Date    ALBUMIN 4.3 05/17/2021    PROTTOTAL 7.3 05/17/2021    ALT 33 05/17/2021    AST 33 05/17/2021    GGT 25 05/17/2021    ALKPHOS 85 05/17/2021    BILITOTAL 1.2 05/17/2021     OTHER:   Lab Results   Component Value Date    ARLETH 9.5  08/03/2020    PHOS 2.9 10/17/2006    MAG 2.1 10/19/2006    TSH 1.65 11/03/2009    T4 6.9 03/25/2004    SED 8 01/10/2007       Anesthesia Plan    ASA Status:  2   NPO Status:  NPO Appropriate    Anesthesia Type: General.     - Airway: ETT   Induction: Intravenous, Propofol.   Maintenance: Balanced.        Consents    Anesthesia Plan(s) and associated risks, benefits, and realistic alternatives discussed. Questions answered and patient/representative(s) expressed understanding.     - Discussed with:  Patient      - Extended Intubation/Ventilatory Support Discussed: No.      - Patient is DNR/DNI Status: No    Use of blood products discussed: Yes.     - Discussed with: Patient.     - Consented: consented to blood products            Reason for refusal: other.     Postoperative Care    Pain management: IV analgesics, Peripheral nerve block (Single Shot), Multi-modal analgesia.   PONV prophylaxis: Ondansetron (or other 5HT-3), Dexamethasone or Solumedrol     Comments:    The risks and benefits of anesthesia, and the alternatives where applicable, have been discussed with the patient, and they wish to proceed.  Plan/risks discussed for pre-op interscalene block, all questions answered and pt states understanding and accepts plan/risks            Chava Deal RN

## 2021-08-11 ENCOUNTER — ANESTHESIA (OUTPATIENT)
Dept: SURGERY | Facility: CLINIC | Age: 61
End: 2021-08-11
Payer: COMMERCIAL

## 2021-08-11 ENCOUNTER — ANCILLARY PROCEDURE (OUTPATIENT)
Dept: ULTRASOUND IMAGING | Facility: CLINIC | Age: 61
End: 2021-08-11
Attending: NURSE ANESTHETIST, CERTIFIED REGISTERED
Payer: COMMERCIAL

## 2021-08-11 ENCOUNTER — HOSPITAL ENCOUNTER (OUTPATIENT)
Facility: CLINIC | Age: 61
Discharge: HOME | End: 2021-08-11
Attending: ORTHOPAEDIC SURGERY | Admitting: ORTHOPAEDIC SURGERY
Payer: COMMERCIAL

## 2021-08-11 VITALS
SYSTOLIC BLOOD PRESSURE: 121 MMHG | DIASTOLIC BLOOD PRESSURE: 73 MMHG | OXYGEN SATURATION: 96 % | RESPIRATION RATE: 18 BRPM | TEMPERATURE: 97 F | HEART RATE: 57 BPM

## 2021-08-11 DIAGNOSIS — S46.012D TRAUMATIC COMPLETE TEAR OF LEFT ROTATOR CUFF, SUBSEQUENT ENCOUNTER: ICD-10-CM

## 2021-08-11 DIAGNOSIS — M75.22 BICIPITAL TENDONITIS OF LEFT SHOULDER: ICD-10-CM

## 2021-08-11 PROCEDURE — 710N000012 HC RECOVERY PHASE 2, PER MINUTE: Performed by: ORTHOPAEDIC SURGERY

## 2021-08-11 PROCEDURE — 250N000009 HC RX 250: Performed by: NURSE ANESTHETIST, CERTIFIED REGISTERED

## 2021-08-11 PROCEDURE — 360N000077 HC SURGERY LEVEL 4, PER MIN: Performed by: ORTHOPAEDIC SURGERY

## 2021-08-11 PROCEDURE — 999N000141 HC STATISTIC PRE-PROCEDURE NURSING ASSESSMENT: Performed by: ORTHOPAEDIC SURGERY

## 2021-08-11 PROCEDURE — C1713 ANCHOR/SCREW BN/BN,TIS/BN: HCPCS | Performed by: ORTHOPAEDIC SURGERY

## 2021-08-11 PROCEDURE — 710N000010 HC RECOVERY PHASE 1, LEVEL 2, PER MIN: Performed by: ORTHOPAEDIC SURGERY

## 2021-08-11 PROCEDURE — C9290 INJ, BUPIVACAINE LIPOSOME: HCPCS | Performed by: NURSE ANESTHETIST, CERTIFIED REGISTERED

## 2021-08-11 PROCEDURE — 23412 REPAIR ROTATOR CUFF CHRONIC: CPT | Mod: LT | Performed by: ORTHOPAEDIC SURGERY

## 2021-08-11 PROCEDURE — 250N000025 HC SEVOFLURANE, PER MIN: Performed by: ORTHOPAEDIC SURGERY

## 2021-08-11 PROCEDURE — 272N000001 HC OR GENERAL SUPPLY STERILE: Performed by: ORTHOPAEDIC SURGERY

## 2021-08-11 PROCEDURE — 370N000017 HC ANESTHESIA TECHNICAL FEE, PER MIN: Performed by: ORTHOPAEDIC SURGERY

## 2021-08-11 PROCEDURE — 250N000011 HC RX IP 250 OP 636: Performed by: ORTHOPAEDIC SURGERY

## 2021-08-11 PROCEDURE — 29822 SHO ARTHRS SRG LMTD DBRDMT: CPT | Mod: 51 | Performed by: ORTHOPAEDIC SURGERY

## 2021-08-11 PROCEDURE — 250N000011 HC RX IP 250 OP 636: Performed by: NURSE ANESTHETIST, CERTIFIED REGISTERED

## 2021-08-11 PROCEDURE — 258N000003 HC RX IP 258 OP 636: Performed by: PAIN MEDICINE

## 2021-08-11 DEVICE — DBL LOADED 4.75MM BC SWVLK: Type: IMPLANTABLE DEVICE | Site: SHOULDER | Status: FUNCTIONAL

## 2021-08-11 RX ORDER — KETOROLAC TROMETHAMINE 30 MG/ML
INJECTION, SOLUTION INTRAMUSCULAR; INTRAVENOUS PRN
Status: DISCONTINUED | OUTPATIENT
Start: 2021-08-11 | End: 2021-08-11

## 2021-08-11 RX ORDER — DIMENHYDRINATE 50 MG/ML
25 INJECTION, SOLUTION INTRAMUSCULAR; INTRAVENOUS
Status: DISCONTINUED | OUTPATIENT
Start: 2021-08-11 | End: 2021-08-11 | Stop reason: HOSPADM

## 2021-08-11 RX ORDER — CEFAZOLIN SODIUM 2 G/100ML
2 INJECTION, SOLUTION INTRAVENOUS
Status: COMPLETED | OUTPATIENT
Start: 2021-08-11 | End: 2021-08-11

## 2021-08-11 RX ORDER — ONDANSETRON 2 MG/ML
4 INJECTION INTRAMUSCULAR; INTRAVENOUS EVERY 30 MIN PRN
Status: DISCONTINUED | OUTPATIENT
Start: 2021-08-11 | End: 2021-08-11 | Stop reason: HOSPADM

## 2021-08-11 RX ORDER — DEXAMETHASONE SODIUM PHOSPHATE 10 MG/ML
INJECTION, SOLUTION INTRAMUSCULAR; INTRAVENOUS PRN
Status: DISCONTINUED | OUTPATIENT
Start: 2021-08-11 | End: 2021-08-11

## 2021-08-11 RX ORDER — SODIUM CHLORIDE, SODIUM LACTATE, POTASSIUM CHLORIDE, CALCIUM CHLORIDE 600; 310; 30; 20 MG/100ML; MG/100ML; MG/100ML; MG/100ML
INJECTION, SOLUTION INTRAVENOUS CONTINUOUS
Status: DISCONTINUED | OUTPATIENT
Start: 2021-08-11 | End: 2021-08-11 | Stop reason: HOSPADM

## 2021-08-11 RX ORDER — BUPIVACAINE HYDROCHLORIDE AND EPINEPHRINE 5; 5 MG/ML; UG/ML
INJECTION, SOLUTION PERINEURAL PRN
Status: DISCONTINUED | OUTPATIENT
Start: 2021-08-11 | End: 2021-08-11

## 2021-08-11 RX ORDER — FENTANYL CITRATE 50 UG/ML
INJECTION, SOLUTION INTRAMUSCULAR; INTRAVENOUS PRN
Status: DISCONTINUED | OUTPATIENT
Start: 2021-08-11 | End: 2021-08-11

## 2021-08-11 RX ORDER — LIDOCAINE 40 MG/G
CREAM TOPICAL
Status: DISCONTINUED | OUTPATIENT
Start: 2021-08-11 | End: 2021-08-11 | Stop reason: HOSPADM

## 2021-08-11 RX ORDER — CEFAZOLIN SODIUM 2 G/100ML
2 INJECTION, SOLUTION INTRAVENOUS SEE ADMIN INSTRUCTIONS
Status: DISCONTINUED | OUTPATIENT
Start: 2021-08-11 | End: 2021-08-11 | Stop reason: HOSPADM

## 2021-08-11 RX ORDER — MEPERIDINE HYDROCHLORIDE 25 MG/ML
12.5 INJECTION INTRAMUSCULAR; INTRAVENOUS; SUBCUTANEOUS
Status: DISCONTINUED | OUTPATIENT
Start: 2021-08-11 | End: 2021-08-11 | Stop reason: HOSPADM

## 2021-08-11 RX ORDER — PROPOFOL 10 MG/ML
INJECTION, EMULSION INTRAVENOUS PRN
Status: DISCONTINUED | OUTPATIENT
Start: 2021-08-11 | End: 2021-08-11

## 2021-08-11 RX ORDER — OXYCODONE HYDROCHLORIDE 5 MG/1
5 TABLET ORAL EVERY 8 HOURS PRN
Qty: 18 TABLET | Refills: 0 | Status: SHIPPED | OUTPATIENT
Start: 2021-08-11 | End: 2021-08-14

## 2021-08-11 RX ORDER — OXYCODONE HYDROCHLORIDE 5 MG/1
10 TABLET ORAL
Status: DISCONTINUED | OUTPATIENT
Start: 2021-08-11 | End: 2021-08-11 | Stop reason: HOSPADM

## 2021-08-11 RX ORDER — FENTANYL CITRATE 50 UG/ML
50 INJECTION, SOLUTION INTRAMUSCULAR; INTRAVENOUS
Status: DISCONTINUED | OUTPATIENT
Start: 2021-08-11 | End: 2021-08-11 | Stop reason: HOSPADM

## 2021-08-11 RX ORDER — FENTANYL CITRATE 50 UG/ML
50 INJECTION, SOLUTION INTRAMUSCULAR; INTRAVENOUS EVERY 5 MIN PRN
Status: DISCONTINUED | OUTPATIENT
Start: 2021-08-11 | End: 2021-08-11 | Stop reason: HOSPADM

## 2021-08-11 RX ORDER — ONDANSETRON 4 MG/1
4 TABLET, ORALLY DISINTEGRATING ORAL EVERY 30 MIN PRN
Status: DISCONTINUED | OUTPATIENT
Start: 2021-08-11 | End: 2021-08-11 | Stop reason: HOSPADM

## 2021-08-11 RX ORDER — ONDANSETRON 2 MG/ML
INJECTION INTRAMUSCULAR; INTRAVENOUS PRN
Status: DISCONTINUED | OUTPATIENT
Start: 2021-08-11 | End: 2021-08-11

## 2021-08-11 RX ORDER — HYDROMORPHONE HYDROCHLORIDE 1 MG/ML
0.2 INJECTION, SOLUTION INTRAMUSCULAR; INTRAVENOUS; SUBCUTANEOUS EVERY 5 MIN PRN
Status: DISCONTINUED | OUTPATIENT
Start: 2021-08-11 | End: 2021-08-11 | Stop reason: HOSPADM

## 2021-08-11 RX ORDER — LIDOCAINE HYDROCHLORIDE 20 MG/ML
INJECTION, SOLUTION INFILTRATION; PERINEURAL PRN
Status: DISCONTINUED | OUTPATIENT
Start: 2021-08-11 | End: 2021-08-11

## 2021-08-11 RX ADMIN — CEFAZOLIN SODIUM 2 G: 2 INJECTION, SOLUTION INTRAVENOUS at 12:09

## 2021-08-11 RX ADMIN — ROCURONIUM BROMIDE 50 MG: 10 INJECTION INTRAVENOUS at 12:20

## 2021-08-11 RX ADMIN — Medication 10 ML: at 12:06

## 2021-08-11 RX ADMIN — DEXAMETHASONE SODIUM PHOSPHATE 10 MG: 10 INJECTION, SOLUTION INTRAMUSCULAR; INTRAVENOUS at 12:29

## 2021-08-11 RX ADMIN — KETOROLAC TROMETHAMINE 30 MG: 30 INJECTION, SOLUTION INTRAMUSCULAR at 13:25

## 2021-08-11 RX ADMIN — ONDANSETRON 4 MG: 2 INJECTION INTRAMUSCULAR; INTRAVENOUS at 12:57

## 2021-08-11 RX ADMIN — BUPIVACAINE 10 ML: 13.3 INJECTION, SUSPENSION, LIPOSOMAL INFILTRATION at 12:07

## 2021-08-11 RX ADMIN — FENTANYL CITRATE 50 MCG: 50 INJECTION, SOLUTION INTRAMUSCULAR; INTRAVENOUS at 11:53

## 2021-08-11 RX ADMIN — PROPOFOL 250 MG: 10 INJECTION, EMULSION INTRAVENOUS at 12:19

## 2021-08-11 RX ADMIN — LIDOCAINE HYDROCHLORIDE 40 MG: 20 INJECTION, SOLUTION INFILTRATION; PERINEURAL at 12:19

## 2021-08-11 RX ADMIN — MIDAZOLAM 2 MG: 1 INJECTION INTRAMUSCULAR; INTRAVENOUS at 11:50

## 2021-08-11 RX ADMIN — PROPOFOL 50 MG: 10 INJECTION, EMULSION INTRAVENOUS at 12:20

## 2021-08-11 RX ADMIN — SUGAMMADEX 200 MG: 100 INJECTION, SOLUTION INTRAVENOUS at 13:28

## 2021-08-11 RX ADMIN — SODIUM CHLORIDE, POTASSIUM CHLORIDE, SODIUM LACTATE AND CALCIUM CHLORIDE: 600; 310; 30; 20 INJECTION, SOLUTION INTRAVENOUS at 12:55

## 2021-08-11 RX ADMIN — FENTANYL CITRATE 50 MCG: 50 INJECTION, SOLUTION INTRAMUSCULAR; INTRAVENOUS at 11:59

## 2021-08-11 NOTE — DISCHARGE INSTRUCTIONS
General Shoulder Arthroscopy Discharge Instructions  Dr. Richter                                        [phone]  [facility] Service Line for issues or concerns      General Care:  After surgery you may feel tired/sleepy. This is normal. Please have someone stay with you for 24 hours after surgery. You should avoid driving for 1-2 days after surgery, as your reaction time may be slow. You should not drive at all if you have had surgery on your arms, right leg and/or are taking narcotic pain medications until released by your doctor. If you have any question along the way please contact the office. If you feel it is an issue cannot wait for normal office hours, contact the on-call physician.      Bathing:  Do not submerge your incision in water such as a bath or pool. It is ok to shower after removing your initial bandage after the first 2 days from surgery. Avoid any excessively hot showers, baths, or hot tubes after surgery.     Diet:  Start with non-alcoholic liquids at first, particularly water or sports drinks after surgery. Progress to bland foods such as crackers and bread and finally to your normal diet if you have no problems.     Pain control:  Take your pain medications as prescribed. These medications may make you sleepy. Do not drive, operate equipment, or drink alcohol when taking these.  You may take Tylenol (Generic name is acetaminophen) as directed on the bottle for additional relief or in place of the prescribed pain medications as your pain gets better. If the medications cause a reaction such as nausea or skin rash, stop taking them and contact your doctor. Please plan accordingly, pain medications will not be re-filled on the weekends or at night. Call the office during the day if you need more medications. Narcotic pain medication can cause constipation, take the stool softer as prescribed.       Sleeping Position:  Sometimes this can be a challenge after shoulder surgery. Some find it  comfortable sleeping propped up on several pillows with pillows also behind their elbow. Others, if available, sleep in an easy chair. You may also lay on your back, it may be more comfortable to have a pillow behind your elbow to keep it from falling back.     Sling/Brace:  Keep the sling or brace on after surgery until your follow up. You may remove to bathe, but keep your elbow to your side and do not reach with your arm. You should also slip your arm out of your sling and allow your elbow to straighten all the way out and then bend all the way up. You may need to use your other arm to assist in this. Also make sure to move your wrist back and forth and practice making a fist. Do this 10-15 times about 3-4 times a day.       Physical Therapy:  Depending on your surgery, physical therapy may start within a few days or be delayed 4-6 weeks. At your first post-operative visit, your doctor will direct you on your personal therapy program.     Normal findings after surgery:  It is sometimes normal to have pain in the back of the shoulder even before the block wears off in the hand or elbow.  Warmth and swelling about the hand and shoulder is normal for up to 3-4 weeks after surgery.  Numbness around the incisions is normal.  You may have bruising around the incisions and into the bicep area. You may notice this bruising settle into the elbow and forearm.   Low grade fevers less than 100.5 degrees Fahrenheit are normal.     When to call the Office: [phone]  Temperature greater than 101.5 degrees Fahreheit.  Fever, chills, and increasing pain in the shoulder.  Excessive drainage from the incisions that include bright red blood.  Drainage from the incisions sites that appear yellow, pus-like, or foul smelling.  Increasing pain the shoulder not relieved by the prescribed pain medications or ice.  Persistent nausea or vomiting not helped by the Zofran.  Any other effects you feel are significant.    [facility]  Same-Day Surgery   Adult Discharge Orders & Instructions     For 24 hours after surgery    1. Get plenty of rest.  A responsible adult must stay with you for at least 24 hours after you leave the hospital.   2. Do not drive or use heavy equipment.  If you have weakness or tingling, don't drive or use heavy equipment until this feeling goes away.  3. Do not drink alcohol.  4. Avoid strenuous or risky activities.  Ask for help when climbing stairs.   5. You may feel lightheaded.  If so, sit for a few minutes before standing.  Have someone help you get up.   6. You may have a slight fever. Call the doctor if your fever is over 100 F (37.7 C) (taken under the tongue) or lasts longer than 24 hours.  7. You may have a dry mouth, a sore throat, muscle aches or trouble sleeping.  These should go away after 24 hours.  8. Do not make important or legal decisions.  We don t expect you to have any problems from the surgery or treatment you had today. Just in case, here s what to do if you have pain, upset stomach (nausea), bleeding or infection:  Pain:  Take medicines your doctor has prescribed or over-the-counter medicine they have suggested. Resting and using ice packs can help, too. For surgery on an arm or leg, raise it on a pillow to ease swelling. Call your doctor if these methods don t work.  Copyright [employer], Licensed under [employer]  Upset stomach (nausea):  Take anti-nausea medicine approved by your doctor. Drink clear liquids like apple juice, ginger ale, broth or 7-Up. Be sure to drink enough fluids. Rest can help, too. Move to normal foods when you re ready. Bleeding:  In the first 24 hours, you may see a little blood on your dressing, about the size of a quarter. You don t need to worry about this much blood, but if the blood spot keeps getting bigger:    Put pressure on the wound if you can, AND    Call your doctor.  Copyright [employer], Licensed under [employer]  Fever/Infection:  Please call your doctor if you have any of these signs:    Redness    Swelling    Wound feels warm    Pain gets worse    Bad-smelling fluid leaks from wound    Fever or chills  Call your doctor for any of the followin.  It has been over 8 to 10 hours since surgery and you are still not able to urinate (pass water).    2.  Headache for over 24 hours.    Nurse advice line: [phone]

## 2021-08-11 NOTE — ANESTHESIA CARE TRANSFER NOTE
Patient: Gomez Gutierrez    Procedure(s):  ARTHROTOMY, SHOULDER, WITH ROTATOR CUFF REPAIR.  Left shoulder arthroscopy first.  Arthroscopy shoulder left     Diagnosis: Traumatic complete tear of left rotator cuff, subsequent encounter [S46.012D]  Bicipital tendonitis of left shoulder [M75.22]  Diagnosis Additional Information: No value filed.    Anesthesia Type:   General     Note:    Oropharynx: oropharynx clear of all foreign objects and spontaneously breathing  Level of Consciousness: drowsy  Oxygen Supplementation: face mask    Independent Airway: airway patency satisfactory and stable  Dentition: dentition unchanged  Vital Signs Stable: post-procedure vital signs reviewed and stable  Report to RN Given: handoff report given  Patient transferred to: PACU    Handoff Report: Identifed the Patient, Identified the Reponsible Provider, Reviewed the pertinent medical history, Discussed the surgical course, Reviewed Intra-OP anesthesia mangement and issues during anesthesia, Set expectations for post-procedure period and Allowed opportunity for questions and acknowledgement of understanding      Vitals:  Vitals Value Taken Time   BP     Temp     Pulse     Resp 16 08/11/21 1346   SpO2 98 % 08/11/21 1346   Vitals shown include unvalidated device data.    Electronically Signed By: TIFFANY Atkinson CRNA  August 11, 2021  1:47 PM

## 2021-08-11 NOTE — ANESTHESIA PROCEDURE NOTES
Airway       Patient location during procedure: OR       Procedure Start/Stop Times: 8/11/2021 12:22 PM  Staff -        CRNA: Jose Morocho APRN CRNA       Performed By: CRNA  Consent for Airway        Urgency: elective  Indications and Patient Condition       Indications for airway management: osbaldo-procedural       Induction type:intravenous       Mask difficulty assessment: 1 - vent by mask    Final Airway Details       Final airway type: endotracheal airway       Successful airway: ETT - single  Endotracheal Airway Details        ETT size (mm): 7.0       Cuffed: yes       Cuff volume (mL): 10       Successful intubation technique: direct laryngoscopy       DL Blade Type: Velasco 3       Grade View of Cords: 2       Adjucts: stylet       Position: Right       Measured from: lips       Secured at (cm): 24       Bite block used: Oral Airway    Post intubation assessment        Placement verified by: capnometry, equal breath sounds and chest rise        Number of attempts at approach: 1       Number of other approaches attempted: 0       Secured with: silk tape       Ease of procedure: easy       Dentition: Intact and Unchanged

## 2021-08-11 NOTE — ANESTHESIA PROCEDURE NOTES
Interscalene Procedure Note  Pre-Procedure   Staff -        CRNA: Jose Morocho APRN CRNA       Performed By: CRNA       Location: post-op       Procedure Start/Stop Times: 8/11/2021 11:50 AM and 8/11/2021 12:09 PM       Pre-Anesthestic Checklist: patient identified, IV checked, site marked, risks and benefits discussed, informed consent, monitors and equipment checked, pre-op evaluation, at physician/surgeon's request and post-op pain management  Timeout:       Correct Patient: Yes        Correct Procedure: Yes        Correct Site: Yes        Correct Position: Yes        Correct Laterality: Yes        Site Marked: Yes  Procedure Documentation  Procedure: interscalene       Laterality: left       Patient Position: sitting       Patient Prep/Sterile Barriers: sterile gloves, mask       Skin prep: Chloraprep       Local skin infiltrated with 1 mL of 1% lidocaine.        Needle Type: insulated       Needle Gauge: 22.        Needle Length (millimeters): 100        Ultrasound guided       1. Ultrasound was used to identify targeted nerve, plexus, vascular marker, or fascial plane and place a needle adjacent to it in real-time.       2. Ultrasound was used to visualize the spread of anesthetic in close proximity to the above referenced structure.       3. A permanent image is entered into the patient's record.       Nerve Stim: Initial Level 0.44 mA.  Lowest motor response 0.32 mA.    Assessment/Narrative         The placement was negative for: blood aspirated, painful injection and site bleeding       Paresthesias: No.    Test dose of mL at.         Test dose negative, 3 minutes after injection, for signs of intravascular, subdural, or intrathecal injection.     Bolus given via needle..        Secured via.        Insertion/Infusion Method: Single Shot       Complications: none       Injection made incrementally with aspirations every 5 mL.    Comments:  Pt tolerated the procedure well.  Pain decreased from 10 /10 to 0  /10.  No complications noted.  Will follow if needed.

## 2021-08-11 NOTE — OP NOTE
Pre-operative diagnosis: Traumatic complete tear of left rotator cuff, subsequent encounter [S45.861D]  Bicipital tendonitis of left shoulder [M75.22]  Post-operative diagnosis Same as pre-operative diagnosis    Procedure: Procedure(s):  ARTHROTOMY, SHOULDER, WITH ROTATOR CUFF REPAIR.  Left shoulder arthroscopy first.  Arthroscopy shoulder left , biceps tenotomy  Surgeon: Surgeon(s) and Role:     * Fercho Guerin,  - Primary     * Tyshawn Barajas PA-C - Assisting  Anesthesia: Combined General with Block   Estimated blood loss: Less than 10 ml  Drains: None  Specimens: * No specimens in log *  Findings:   None.  Complications: None.  Implants:   Implant Name Type Inv. Item Serial No.  Lot No. LRB No. Used Action   DBL LOADED 4.75MM BC SWVLK - [id number] [employer]ware/[place] DBL LOADED 4.75MM BC SWVLK  [employer] [id number] Left 1 Implanted         History and indications for the procedure:    The patient was seen preoperatively in clinic.  He had a long ongoing problem of left shoulder pain.  He had a known rotator cuff tear of the.  This was over a year ago.  We obtained a new MRI confirmed the above-mentioned diagnosis.  Risks and goals of the procedure were explained to the patient and informed consent was obtained.    Details of the procedure:    The patient was seen preoperatively by myself and the anesthesia staff in the preoperative holding area where the operative site was marked.  A preoperative interscalene block was performed.  The patient was brought to the operative suite by the anesthesia staff where general anesthesia was administered.  The left upper extremity was then prepped and draped in a sterile manner a timeout was called identifying the correct patient, the correct procedure, the correct site, and that antibiotics were in an appropriate period of time.  Preparation included alcohol, hydrogen peroxide, and chlorhexidine.  A posterior portal was first made and the joint was entered  and insufflated.  Biceps tendon was found to be degenerative.  There is a posterior superior labral tear present.  A small rotator cuff tear was identified.  Minimal chondromalacia was visualized.  No other abnormalities were seen.  A spinal needle was used to make an anterior portal followed by a blade and a trocar.  I entered with a shaver and then debrided part of the biceps tendon as well as the posterior superior labral tear.  I then used an ablation unit to release the biceps tendon.  I then used the ablation unit to clean up the edges of the labral tear.  I then removed all my instruments from the shoulder.    Another ChloraPrep was then performed.  An incision was made off the anterior lateral border of the acromion.  Bleeding was controlled Bovie electrocautery.  The raphae between the median and anterior deltoid was then visualized.  I used pickups and Metzenbaums to divide this plane.  We then used weed use for retraction.  Joint fluid was encountered.  I did remove a small portion of bursa.  The tear was then visualized which was retracted just over 2 cm.  This was more of a side to side tear.  I placed a tape and using a free needle placed suture just medial to the tear.  I then placed an anchor after using a curette to create a bleeding bed of bone at the footprint.  I then tensioned the tape and sunk the anchor.  I then used the tape to do another horizontal mattress suture.  This was more of a margin convergence pattern.  I then used an unloading suture which was a #2 FiberWire to tie down the anchor stack.    We then copiously irrigated with Betadine infused irrigation and closed the raphae of the deltoid with 0 strata fix.  We then closed with 2-0 Vicryl and 3-0 Monocryl.  The portals were covered with 2-0 Monocryl.  Glue was then used followed by a sterile dressing.  The patient was then allowed awaken from general anesthesia and taken to the PACU in stable condition.

## 2021-08-11 NOTE — ANESTHESIA POSTPROCEDURE EVALUATION
Patient: Gomez Gutierrez    Procedure(s):  ARTHROTOMY, SHOULDER, WITH ROTATOR CUFF REPAIR.  Left shoulder arthroscopy first.  Arthroscopy shoulder left     Diagnosis:Traumatic complete tear of left rotator cuff, subsequent encounter [S46.012D]  Bicipital tendonitis of left shoulder [M75.22]  Diagnosis Additional Information: No value filed.    Anesthesia Type:  General    Note:  Disposition: Outpatient   Postop Pain Control: Uneventful            Sign Out: Well controlled pain   PONV: No   Neuro/Psych: Uneventful            Sign Out: Acceptable/Baseline neuro status   Airway/Respiratory: Uneventful            Sign Out: Acceptable/Baseline resp. status   CV/Hemodynamics: Uneventful            Sign Out: Acceptable CV status   Other NRE: NONE   DID A NON-ROUTINE EVENT OCCUR? No    Event details/Postop Comments:  Pt was happy with anesthesia care.  No complications. Pt aware to pad under blocked arm until block wears off.   I will follow up with the pt if needed.           Last vitals:  Vitals Value Taken Time   /77 08/11/21 1400   Temp 97  F (36.1  C) 08/11/21 1405   Pulse 62 08/11/21 1402   Resp 19 08/11/21 1402   SpO2 93 % 08/11/21 1403   Vitals shown include unvalidated device data.    Electronically Signed By: TIFFANY Atkinson CRNA  August 11, 2021  2:54 PM

## 2021-08-11 NOTE — BRIEF OP NOTE
[facility]    Brief Operative Note    Pre-operative diagnosis: Traumatic complete tear of left rotator cuff, subsequent encounter [S46.012D]  Bicipital tendonitis of left shoulder [M75.22]  Post-operative diagnosis Same as pre-operative diagnosis    Procedure: Procedure(s):  ARTHROTOMY, SHOULDER, WITH ROTATOR CUFF REPAIR.  Left shoulder arthroscopy first.  Arthroscopy shoulder left , biceps tenotomy  Surgeon: Surgeon(s) and Role:     * Fercho Guerin DO - Primary     * Tyshawn Barajas PA-C - Assisting  Anesthesia: Combined General with Block   Estimated blood loss: Less than 10 ml  Drains: None  Specimens: * No specimens in log *  Findings:   None.  Complications: None.  Implants:   Implant Name Type Inv. Item Serial No.  Lot No. LRB No. Used Action   DBL LOADED 4.75MM BC SWVLK - [id number] Metallic Hardware/[place] DBL LOADED 4.75MM BC SWVLK  [employer] [id number] Left 1 Implanted

## 2021-08-23 ENCOUNTER — OFFICE VISIT (OUTPATIENT)
Dept: ORTHOPEDICS | Facility: CLINIC | Age: 61
End: 2021-08-23
Payer: COMMERCIAL

## 2021-08-23 VITALS
HEIGHT: 78 IN | HEART RATE: 70 BPM | SYSTOLIC BLOOD PRESSURE: 139 MMHG | BODY MASS INDEX: 19.09 KG/M2 | DIASTOLIC BLOOD PRESSURE: 70 MMHG | WEIGHT: 165 LBS

## 2021-08-23 DIAGNOSIS — Z98.890 S/P LEFT ROTATOR CUFF REPAIR: ICD-10-CM

## 2021-08-23 DIAGNOSIS — Z98.890 S/P ARTHROSCOPY OF LEFT SHOULDER: Primary | ICD-10-CM

## 2021-08-23 PROCEDURE — 99024 POSTOP FOLLOW-UP VISIT: CPT | Performed by: PHYSICIAN ASSISTANT

## 2021-08-23 ASSESSMENT — MIFFLIN-ST. JEOR: SCORE: 1673.75

## 2021-08-23 NOTE — LETTER
8/23/2021         RE: Gomez Gutierrez  [phone]  [facility] [address]        Dear Colleague,    Thank you for referring your patient, Gomez Gutierrez, to the [facility]. Please see a copy of my visit note below.    Orthopedic Clinic Post-Operative Note    CHIEF COMPLAINT:   Chief Complaint   Patient presents with     Surgical Followup     DOS: 8/11/2021~ARTHROTOMY, SHOULDER, WITH ROTATOR CUFF REPAIR.  Left shoulder arthroscopy first.~12 days postop       HISTORY OF PRESENT ILLNESS  Location: Left shoulder  Rating of Pain: Minimal pain  Pain is better with: Rest  Pain improving overall: Yes  Associated Features: Denies any numbness or tingling or problems with his incision such as drainage fevers or chills.  Patient concerns: None.  Additional History: Patient is very happy with how the surgery turned out and his lack of pain.  Patient is not taken any pain medication not even taking Tylenol.    Patient's past medical, surgical, social and family histories reviewed.     Past Medical History:   Diagnosis Date     Arthritis      Lumbago      Major depression in complete remission (H) 8/30/2012     Severe Depression [296.33] 6/18/2009     Sleep apnea      Tobacco use disorder        Past Surgical History:   Procedure Laterality Date     ARTHROSCOPY KNEE WITH MEDIAL MENISCECTOMY Right 2/24/2020    Procedure: ARTHROSCOPY, KNEE, WITH MEDIAL MENISCECTOMY right;  Surgeon: Eliot Bañuelos MD;  [facility]     ARTHROSCOPY SHOULDER Left 8/11/2021    Procedure: Arthroscopy shoulder left ;  Surgeon: Fercho Guerin DO;  Location: [facility]     ARTHROTOMY SHOULDER, ROTATOR CUFF REPAIR, COMBINED Left 8/11/2021    Procedure: ARTHROTOMY, SHOULDER, WITH ROTATOR CUFF REPAIR;  Surgeon: Fercho Guerin DO;  Location: [facility] OR     BACK SURGERY  11/2015    cleaned out bone spurs     C ECHO HEART XTHORACIC,COMPLETE, W/O DOPPLER  02/04/07    normal cardiac stress echo test     C ESTEBAN W/O FACETEC FORAMOT/DSKC  1/2 VRT SEG, LUMBAR  1/02    L4-5     C REPAIR LUMBAR HERNIA  1986    L5-4 L5-S1     COLONOSCOPY N/A 2/18/2020    Procedure: Colonoscopy;  Surgeon: Juan Beltrán DO;  Location: [facility] GI     HC REMOVE TONSILS/ADENOIDS,<13 Y/O      T & A <11y.o.     LAMINECT/DISCECTOMY, LUMBAR  10/19/06    L3-4     LAMINECT/DISCECTOMY, LUMBAR  11/10    left sided L4-5     REMOVAL OF SPERM DUCT(S)      Vasectomy     ROTATOR CUFF REPAIR RT/LT  3/12    right sided     ZZHC COLONOSCOPY W BIOPSY  11/05/09       Medications:  ketorolac (TORADOL) 10 MG tablet, Take 1 tablet (10 mg) by mouth every 6 hours as needed for moderate pain  latanoprost (XALATAN) 0.005 % ophthalmic solution, INSTILL 1 DROP INTO EACH EYE EVERY DAY AT BEDTIME  oxyCODONE-acetaminophen (PERCOCET) 5-325 MG tablet, Take 1 tablet by mouth every 6 hours as needed for severe pain   predniSONE (DELTASONE) 10 MG tablet, Take 40 mg po daily for 4-7 days when there is a flair of muscle spasms in the low back region  temazepam (RESTORIL) 15 MG capsule, Take 1 capsule (15 mg) by mouth nightly as needed for sleep    No current facility-administered medications on file prior to visit.      Allergies   Allergen Reactions     No Known Drug Allergies        Social History     Occupational History     Employer: DISABLED     Comment: not working    Tobacco Use     Smoking status: Current Every Day Smoker     Packs/day: 1.00     Years: 45.00     Pack years: 45.00     Types: Cigarettes     Smokeless tobacco: Never Used     Tobacco comment:  wants to quit   Vaping Use     Vaping Use: Never used   Substance and Sexual Activity     Alcohol use: Yes     Alcohol/week: 6.0 standard drinks     Types: 6 Cans of beer per week     Comment: 4-6 per day     Drug use: Yes     Types: Marijuana     Sexual activity: Yes     Partners: Female       Family History   Problem Relation Age of Onset     Arthritis Father      Depression Father      Heart Failure Father 80        CHF     Respiratory Mother   "       emphysema     Diabetes Mother      Other - See Comments Brother 21         in an MVA (oldest sibling     Other - See Comments Brother         older  all other siblings are 1/2      Other - See Comments Brother         older     Heart Disease Brother      Other - See Comments Brother         older     Other - See Comments Brother         older     Other - See Comments Brother         younger     Other - See Comments Brother 21        motorcycle injury, Brain death     Other - See Comments Brother         full sibling but given up for adoption 1 yr older     Other - See Comments Grandchild         3 grand daughters 1 grand son       REVIEW OF SYSTEMS  General: negative for, night sweats, dizziness, fatigue  Resp: No shortness of breath and no cough  CV: negative for chest pain, syncope or near-syncope  GI: negative for nausea, vomiting and diarrhea  : negative for dysuria and hematuria  Musculoskeletal: as above  Neurologic: negative for syncope   Hematologic: negative for bleeding disorder    Physical Exam:  Vitals: /70   Pulse 70   Ht 1.969 m (6' 5.5\")   Wt 74.8 kg (165 lb)   BMI 19.31 kg/m    BMI= Body mass index is 19.31 kg/m .  Constitutional: healthy, alert and no acute distress   Psychiatric: mentation appears normal and affect normal/bright  NEURO: no focal deficits, CMS intact left upper extremity   RESP: Normal with easy respirations and no use of accessory muscles to breathe, no audible wheezing or retractions  CV: +2 radial pulse and his hand is warm to palpation.   SKIN: Incision healing well with no swelling or erythema and glue over the top of it still.  The rest of the left shoulder with no erythema, rashes, excoriation, or breakdown. No evidence of infection.   MUSCULOSKELETAL:    INSPECTION of left shoulder: No gross deformities, erythema, edema, ecchymosis, atrophy or fasciculations.     PALPATION: No tenderness AC joint, proximal biceps tendon, clavicle, lateral shoulder, " posterior shoulder, trapezius area. No increased warmth noted.     ROM: Passive: Forward flexion to 90 degrees and 180 on the contralateral side, abduction to 90 degrees and 180 on the contralateral side, external rotation to 25 degrees, internal rotation to lumbar spine.  All range of motion is without catching locking or pain other than at maximal forward flexion abduction and external rotation.     STRENGTH: 5 out of 5 , deltoid    SPECIAL TEST: none  GAIT: non-antalgic  Lymph: no palpable lymph nodes    Diagnostic Modalities:  Recent Results (from the past 744 hour(s))   MR Shoulder Left w/o Contrast    Narrative    EXAM: MR left shoulder without  contrast 8/5/2021 3:10 PM    TECHNIQUE: Multiplanar, multisequence imaging of the left shoulder  were obtained without administration of intravenous or intra-articular  gadolinium contrast using routine protocol.    History: Shoulder pain, rotator cuff disorder suspected, xray done;  Acute pain of left shoulder; Traumatic complete tear of left rotator  cuff, subsequent encounter    Additional Clinical information from EMR: Left shoulder MRI 1/23/2020,  shoulder x-ray 10/27/2020    Comparison: 1/23/2020    Findings:    ROTATOR CUFF and ASSOCIATED STRUCTURES  Rotator cuff:    On a background of severe tendinosis, redemonstrated full-thickness  tear of the supraspinatus tendon posterior fibers including  infraspinatus junctional fibers at the footprint with extension  anteriorly as high-grade partial-thickness articular sided tear. This  overall appears similar to prior. Approximate 1.4 cm of medial  retraction.     Subscapularis tendinosis. Teres minor tendon is intact.    Bursa: Small to moderate subacromial/subdeltoid bursal fluid with  internal debris, likely synovial proliferation. Large amount of fluid  within the subcoracoid bursa, communicating with  subacromial/subdeltoid bursa.    Musculature: Medial retraction of the muscle belly partially  compromising  muscle bulk assessment. Grossly muscle bulk of rotator  cuff is preserved.  Deltoid muscle bulk is also preserved.  No muscle  edema.    Acromioclavicular joint  There are moderate degenerative changes of the acromioclavicular  joint. Acromion is type 2 in sagittal morphology.  Coracoacromial  ligament is not thickened.    OSSEOUS STRUCTURES  No fracture, marrow contusion or marrow infiltration. Cystlike changes  and edema-like marrow signal intensity at the greater tuberosity  secondary to rotator cuff tendon pathology.    LONG BICIPITAL TENDON  Severe intra-articular segment tendinosis with partial tear at the  bicipital pulley segment and upper intertrabecular groove segment.    GLENOHUMERAL JOINT  Joint fluid: Small to moderate joint effusion with synovial  proliferation.    Cartilage and subarticular bone:  No focal hyaline cartilage defects  are noted. No Hill-Sachs, reverse Hill-Sachs, or bony Bankart lesions  are seen.     Labrum: Posterior superior labral tear, similar to prior.    ANCILLARY FINDINGS:  None      Impression    Impression:  1. Overall similar appearance of the previously described  supraspinatus/infraspinatus tendinosis with partial with  full-thickness tearing. Stable 2.1 cm retraction of the supraspinatus  tendon from the footprint.  2. Subscapularis tendinosis.  3. Similar appearing biceps tendinosis within the intra-articular  segment and intrasubstance tearing at the bicipital pulley and upper  bicipital groove.  4. Similar appearing suspected posterior superior labral tear.  5. Subcoracoid bursitis.    I have personally reviewed the examination and initial interpretation  and I agree with the findings.    24/7 CardAHASHI         SYSTEM ID:  [id number]     No radiographs necessary today.        Impression: 1.  12 days status post left shoulder arthroscopy with biceps tenotomy and then open rotator cuff repair by Dr. Guerin    FOCUSED PLAN:   Patient is not taking any pain medication not  even Tylenol.  Patient is very happy with his surgery and his experience. Okay to start massaging incision in 1 week.  Physical therapy ordered today, this can start right away.  Can discontinue sling now also. Work: No work restrictions needed.  Follow-up in 4 weeks which is 6 weeks status post surgery with myself.  Then follow-up at 3 months status post surgery with me.     Re-x-ray on return: No      This note was dictated with [employer].    Tyshawn Barajas PA-C              Again, thank you for allowing me to participate in the care of your patient.        Sincerely,        Tyshawn Barajas PA-C

## 2021-08-23 NOTE — PROGRESS NOTES
Orthopedic Clinic Post-Operative Note    CHIEF COMPLAINT:   Chief Complaint   Patient presents with     Surgical Followup     DOS: 8/11/2021~ARTHROTOMY, SHOULDER, WITH ROTATOR CUFF REPAIR.  Left shoulder arthroscopy first.~12 days postop       HISTORY OF PRESENT ILLNESS  Location: Left shoulder  Rating of Pain: Minimal pain  Pain is better with: Rest  Pain improving overall: Yes  Associated Features: Denies any numbness or tingling or problems with his incision such as drainage fevers or chills.  Patient concerns: None.  Additional History: Patient is very happy with how the surgery turned out and his lack of pain.  Patient is not taken any pain medication not even taking Tylenol.    Patient's past medical, surgical, social and family histories reviewed.     Past Medical History:   Diagnosis Date     Arthritis      Lumbago      Major depression in complete remission (H) 8/30/2012     Severe Depression [296.33] 6/18/2009     Sleep apnea      Tobacco use disorder        Past Surgical History:   Procedure Laterality Date     ARTHROSCOPY KNEE WITH MEDIAL MENISCECTOMY Right 2/24/2020    Procedure: ARTHROSCOPY, KNEE, WITH MEDIAL MENISCECTOMY right;  Surgeon: Eliot Bañuelos MD;  [facility]     ARTHROSCOPY SHOULDER Left 8/11/2021    Procedure: Arthroscopy shoulder left ;  Surgeon: Fercho Guerin DO;  Location: [facility]     ARTHROTOMY SHOULDER, ROTATOR CUFF REPAIR, COMBINED Left 8/11/2021    Procedure: ARTHROTOMY, SHOULDER, WITH ROTATOR CUFF REPAIR;  Surgeon: Fercho Guerin DO;  Location: [facility]     BACK SURGERY  11/2015    cleaned out bone spurs     C ECHO HEART XTHORACIC,COMPLETE, W/O DOPPLER  02/04/07    normal cardiac stress echo test     C ESTEBAN W/O FACETEC FORAMOT/DSKC 1/2 VRT SEG, LUMBAR  1/02    L4-5     C REPAIR LUMBAR HERNIA  1986    L5-4 L5-S1     COLONOSCOPY N/A 2/18/2020    Procedure: Colonoscopy;  Surgeon: Juan Beltrán DO;  Location:  GI     HC REMOVE TONSILS/ADENOIDS,<13 Y/O      T & A  <11y.o.     LAMINECT/DISCECTOMY, LUMBAR  10/19/06    L3-4     LAMINECT/DISCECTOMY, LUMBAR  11/10    left sided L4-5     REMOVAL OF SPERM DUCT(S)      Vasectomy     ROTATOR CUFF REPAIR RT/LT  3/12    right sided     ZZHC COLONOSCOPY W BIOPSY  09       Medications:  ketorolac (TORADOL) 10 MG tablet, Take 1 tablet (10 mg) by mouth every 6 hours as needed for moderate pain  latanoprost (XALATAN) 0.005 % ophthalmic solution, INSTILL 1 DROP INTO EACH EYE EVERY DAY AT BEDTIME  oxyCODONE-acetaminophen (PERCOCET) 5-325 MG tablet, Take 1 tablet by mouth every 6 hours as needed for severe pain   predniSONE (DELTASONE) 10 MG tablet, Take 40 mg po daily for 4-7 days when there is a flair of muscle spasms in the low back region  temazepam (RESTORIL) 15 MG capsule, Take 1 capsule (15 mg) by mouth nightly as needed for sleep    No current facility-administered medications on file prior to visit.      Allergies   Allergen Reactions     No Known Drug Allergies        Social History     Occupational History     Employer: DISABLED     Comment: not working    Tobacco Use     Smoking status: Current Every Day Smoker     Packs/day: 1.00     Years: 45.00     Pack years: 45.00     Types: Cigarettes     Smokeless tobacco: Never Used     Tobacco comment:  wants to quit   Vaping Use     Vaping Use: Never used   Substance and Sexual Activity     Alcohol use: Yes     Alcohol/week: 6.0 standard drinks     Types: 6 Cans of beer per week     Comment: 4-6 per day     Drug use: Yes     Types: Marijuana     Sexual activity: Yes     Partners: Female       Family History   Problem Relation Age of Onset     Arthritis Father      Depression Father      Heart Failure Father 80        CHF     Respiratory Mother         emphysema     Diabetes Mother      Other - See Comments Brother 21         in an MVA (oldest sibling     Other - See Comments Brother         older  all other siblings are 1/2      Other - See Comments Brother         older      "Heart Disease Brother      Other - See Comments Brother         older     Other - See Comments Brother         older     Other - See Comments Brother         younger     Other - See Comments Brother 21        motorcycle injury, Brain death     Other - See Comments Brother         full sibling but given up for adoption 1 yr older     Other - See Comments Grandchild         3 grand daughters 1 grand son       REVIEW OF SYSTEMS  General: negative for, night sweats, dizziness, fatigue  Resp: No shortness of breath and no cough  CV: negative for chest pain, syncope or near-syncope  GI: negative for nausea, vomiting and diarrhea  : negative for dysuria and hematuria  Musculoskeletal: as above  Neurologic: negative for syncope   Hematologic: negative for bleeding disorder    Physical Exam:  Vitals: /70   Pulse 70   Ht 1.969 m (6' 5.5\")   Wt 74.8 kg (165 lb)   BMI 19.31 kg/m    BMI= Body mass index is 19.31 kg/m .  Constitutional: healthy, alert and no acute distress   Psychiatric: mentation appears normal and affect normal/bright  NEURO: no focal deficits, CMS intact left upper extremity   RESP: Normal with easy respirations and no use of accessory muscles to breathe, no audible wheezing or retractions  CV: +2 radial pulse and his hand is warm to palpation.   SKIN: Incision healing well with no swelling or erythema and glue over the top of it still.  The rest of the left shoulder with no erythema, rashes, excoriation, or breakdown. No evidence of infection.   MUSCULOSKELETAL:    INSPECTION of left shoulder: No gross deformities, erythema, edema, ecchymosis, atrophy or fasciculations.     PALPATION: No tenderness AC joint, proximal biceps tendon, clavicle, lateral shoulder, posterior shoulder, trapezius area. No increased warmth noted.     ROM: Passive: Forward flexion to 90 degrees and 180 on the contralateral side, abduction to 90 degrees and 180 on the contralateral side, external rotation to 25 degrees, " internal rotation to lumbar spine.  All range of motion is without catching locking or pain other than at maximal forward flexion abduction and external rotation.     STRENGTH: 5 out of 5 , deltoid    SPECIAL TEST: none  GAIT: non-antalgic  Lymph: no palpable lymph nodes    Diagnostic Modalities:  Recent Results (from the past 744 hour(s))   MR Shoulder Left w/o Contrast    Narrative    EXAM: MR left shoulder without  contrast 8/5/2021 3:10 PM    TECHNIQUE: Multiplanar, multisequence imaging of the left shoulder  were obtained without administration of intravenous or intra-articular  gadolinium contrast using routine protocol.    History: Shoulder pain, rotator cuff disorder suspected, xray done;  Acute pain of left shoulder; Traumatic complete tear of left rotator  cuff, subsequent encounter    Additional Clinical information from EMR: Left shoulder MRI 1/23/2020,  shoulder x-ray 10/27/2020    Comparison: 1/23/2020    Findings:    ROTATOR CUFF and ASSOCIATED STRUCTURES  Rotator cuff:    On a background of severe tendinosis, redemonstrated full-thickness  tear of the supraspinatus tendon posterior fibers including  infraspinatus junctional fibers at the footprint with extension  anteriorly as high-grade partial-thickness articular sided tear. This  overall appears similar to prior. Approximate 1.4 cm of medial  retraction.     Subscapularis tendinosis. Teres minor tendon is intact.    Bursa: Small to moderate subacromial/subdeltoid bursal fluid with  internal debris, likely synovial proliferation. Large amount of fluid  within the subcoracoid bursa, communicating with  subacromial/subdeltoid bursa.    Musculature: Medial retraction of the muscle belly partially  compromising muscle bulk assessment. Grossly muscle bulk of rotator  cuff is preserved.  Deltoid muscle bulk is also preserved.  No muscle  edema.    Acromioclavicular joint  There are moderate degenerative changes of the acromioclavicular  joint.  Acromion is type 2 in sagittal morphology.  Coracoacromial  ligament is not thickened.    OSSEOUS STRUCTURES  No fracture, marrow contusion or marrow infiltration. Cystlike changes  and edema-like marrow signal intensity at the greater tuberosity  secondary to rotator cuff tendon pathology.    LONG BICIPITAL TENDON  Severe intra-articular segment tendinosis with partial tear at the  bicipital pulley segment and upper intertrabecular groove segment.    GLENOHUMERAL JOINT  Joint fluid: Small to moderate joint effusion with synovial  proliferation.    Cartilage and subarticular bone:  No focal hyaline cartilage defects  are noted. No Hill-Sachs, reverse Hill-Sachs, or bony Bankart lesions  are seen.     Labrum: Posterior superior labral tear, similar to prior.    ANCILLARY FINDINGS:  None      Impression    Impression:  1. Overall similar appearance of the previously described  supraspinatus/infraspinatus tendinosis with partial with  full-thickness tearing. Stable 2.1 cm retraction of the supraspinatus  tendon from the footprint.  2. Subscapularis tendinosis.  3. Similar appearing biceps tendinosis within the intra-articular  segment and intrasubstance tearing at the bicipital pulley and upper  bicipital groove.  4. Similar appearing suspected posterior superior labral tear.  5. Subcoracoid bursitis.    I have personally reviewed the examination and initial interpretation  and I agree with the findings.    TheGridAHASHI         SYSTEM ID:  [id number]     No radiographs necessary today.        Impression: 1.  12 days status post left shoulder arthroscopy with biceps tenotomy and then open rotator cuff repair by Dr. Guerin    FOCUSED PLAN:   Patient is not taking any pain medication not even Tylenol.  Patient is very happy with his surgery and his experience. Okay to start massaging incision in 1 week.  Physical therapy ordered today, this can start right away.  Can discontinue sling now also. Work: No work  restrictions needed.  Follow-up in 4 weeks which is 6 weeks status post surgery with myself.  Then follow-up at 3 months status post surgery with me.     Re-x-ray on return: No      This note was dictated with [employer].    Tyshawn Barajas PA-C

## 2021-08-26 ENCOUNTER — TELEPHONE (OUTPATIENT)
Dept: ORTHOPEDICS | Facility: OTHER | Age: 61
End: 2021-08-26

## 2021-08-26 NOTE — TELEPHONE ENCOUNTER
Received copy of protocol.  Faxed to number below.     Tyra NUNEZ, Lead RN, BSN. . .  8/26/2021, 3:57 PM

## 2021-08-26 NOTE — TELEPHONE ENCOUNTER
Reason for Call:  Other appointment    Detailed comments: [facility] calling to get the protocol for rotator cuff tear faxed to fax: [phone] karma Aviles     Phone Number Patient can be reached at: Home number on file [phone] (home)    Best Time: any    Can we leave a detailed message on this number? YES    Call taken on 8/26/2021 at 1:56 PM by Karuna Maurice

## 2021-08-26 NOTE — TELEPHONE ENCOUNTER
Reaching out to PT and Ortho to see if we have a protocol for RTC repair. I only have a copy of a meniscus and ACL repair protocol.     Tyra NUNEZ, Lead RN, BSN. . .  8/26/2021, 2:23 PM

## 2021-09-21 ENCOUNTER — TELEPHONE (OUTPATIENT)
Dept: ORTHOPEDICS | Facility: CLINIC | Age: 61
End: 2021-09-21

## 2021-09-21 NOTE — TELEPHONE ENCOUNTER
calling with questions regarding pt's care and clarification. Please call Traci @  # [phone] to discuss

## 2021-09-21 NOTE — TELEPHONE ENCOUNTER
MARYM for Traci (physical therapist). Requested she return call to the clinic if needed.    Rico Kelly, ANJALI, LAT

## 2021-09-21 NOTE — TELEPHONE ENCOUNTER
Spoke with patient's PT Traci on the phone. Patient has been completing PT for 1 month at [facility]. PT reports that michelle has been having significant pain and discomfort with shoulder flexion and abduction past 90 degrees. He has been having popping and catching. His ER and IR ROM is improved below 90 degrees of flexion or abduction. He has been limited in PROM and AAROM due to pain and stiffness. Therapist stated he started pulley ROM post surgery prior to starting PT on his own at home. He is scheduled for a follow up appointment on 9/27/21 with EUNICE Gabriel. Please advise on steps moving forward.

## 2021-09-22 NOTE — TELEPHONE ENCOUNTER
I reviewed the case and gave the patient a call.  I let him know what Dr. Geurin recommended.  Patient understands.  He knows his progress is a little bit slow because he was not able to have his shoulder operated on for a whole year because of the pandemic.  He has some stiffness prior to the surgery.  We discussed potential injection in 4 weeks but not before that because of risk of infection.  He understands.  We will look to see him in clinic on   9/27/2021.  He is to continue his therapy.  He was appreciative of the call.

## 2021-09-27 ENCOUNTER — OFFICE VISIT (OUTPATIENT)
Dept: ORTHOPEDICS | Facility: CLINIC | Age: 61
End: 2021-09-27
Payer: COMMERCIAL

## 2021-09-27 VITALS
DIASTOLIC BLOOD PRESSURE: 72 MMHG | WEIGHT: 166.5 LBS | BODY MASS INDEX: 19.26 KG/M2 | SYSTOLIC BLOOD PRESSURE: 120 MMHG | HEIGHT: 78 IN

## 2021-09-27 DIAGNOSIS — Z98.890 S/P LEFT ROTATOR CUFF REPAIR: Primary | ICD-10-CM

## 2021-09-27 DIAGNOSIS — Z48.89 AFTERCARE FOLLOWING SURGERY: ICD-10-CM

## 2021-09-27 PROCEDURE — 99024 POSTOP FOLLOW-UP VISIT: CPT | Performed by: PHYSICIAN ASSISTANT

## 2021-09-27 RX ORDER — KETOROLAC TROMETHAMINE 10 MG/1
10 TABLET, FILM COATED ORAL EVERY 6 HOURS PRN
Qty: 40 TABLET | Refills: 0 | Status: SHIPPED | OUTPATIENT
Start: 2021-09-27 | End: 2021-11-15

## 2021-09-27 ASSESSMENT — MIFFLIN-ST. JEOR: SCORE: 1680.55

## 2021-09-27 ASSESSMENT — PAIN SCALES - GENERAL: PAINLEVEL: NO PAIN (0)

## 2021-09-27 NOTE — LETTER
9/27/2021         RE: Gomez Gutierrez  [address]  [facility] [address]        Dear Colleague,    Thank you for referring your patient, Gomez Gutierrez, to the [facility]. Please see a copy of my visit note below.    Orthopedic Clinic Post-Operative Note    CHIEF COMPLAINT:   Chief Complaint   Patient presents with     RECHECK     left shoulder follow up [facility].C      Surgical Followup     DOS: 8/11/2021~ARTHROTOMY, SHOULDER, WITH ROTATOR CUFF REPAIR.  Left shoulder arthroscopy first.~6 weeks postop       HISTORY OF PRESENT ILLNESS  Location: Left shoulder  Rating of Pain: Moderate to mild  Pain is better with: Toradol and rest and sometimes prednisone or Zanaflex  Pain improving overall: Yes  Associated Features: Denies numbness or tingling or shooting burning or electric pain.  Patient concerns: Try to work as hard as he can with therapy and hoping to make progress.  He is doing exercises on his own at home.  Additional History: He is doing his formal physical therapy in [place] at [facility].  He is working hard there.  Patient is off narcotics.  He only takes Toradol when he absolutely needs to.    Patient's past medical, surgical, social and family histories reviewed.     Past Medical History:   Diagnosis Date     Arthritis      Lumbago      Major depression in complete remission (H) 8/30/2012     Severe Depression [296.33] 6/18/2009     Sleep apnea      Tobacco use disorder        Past Surgical History:   Procedure Laterality Date     ARTHROSCOPY KNEE WITH MEDIAL MENISCECTOMY Right 2/24/2020    Procedure: ARTHROSCOPY, KNEE, WITH MEDIAL MENISCECTOMY right;  Surgeon: Eliot Bañuelos MD;  [facility]     ARTHROSCOPY SHOULDER Left 8/11/2021    Procedure: Arthroscopy shoulder left ;  Surgeon: Fercho Guerin DO;  [facility]     ARTHROTOMY SHOULDER, ROTATOR CUFF REPAIR, COMBINED Left 8/11/2021    Procedure: ARTHROTOMY, SHOULDER, WITH ROTATOR CUFF REPAIR;  Surgeon:  Fercho Guerin, DO;  [facility]     BACK SURGERY  11/2015    cleaned out bone spurs     C ECHO HEART XTHORACIC,COMPLETE, W/O DOPPLER  02/04/07    normal cardiac stress echo test     C ESTEBAN W/O FACETEC FORAMOT/[facility] 1/2 VRT SEG, LUMBAR  1/02    L4-5     C REPAIR LUMBAR HERNIA  1986    L5-4 L5-S1     COLONOSCOPY N/A 2/18/2020    Procedure: Colonoscopy;  Surgeon: Juan Beltrán DO;  [facility]     HC REMOVE TONSILS/ADENOIDS,<13 Y/O      T & A <11y.o.     LAMINECT/DISCECTOMY, LUMBAR  10/19/06    L3-4     LAMINECT/DISCECTOMY, LUMBAR  11/10    left sided L4-5     REMOVAL OF SPERM DUCT(S)      Vasectomy     ROTATOR CUFF REPAIR RT/LT  3/12    right sided     ZZHC COLONOSCOPY W BIOPSY  11/05/09       Medications:  ketorolac (TORADOL) 10 MG tablet, Take 1 tablet (10 mg) by mouth every 6 hours as needed for moderate pain  latanoprost (XALATAN) 0.005 % ophthalmic solution, INSTILL 1 DROP INTO EACH EYE EVERY DAY AT BEDTIME  oxyCODONE-acetaminophen (PERCOCET) 5-325 MG tablet, Take 1 tablet by mouth every 6 hours as needed for severe pain   predniSONE (DELTASONE) 10 MG tablet, Take 40 mg po daily for 4-7 days when there is a flair of muscle spasms in the low back region  temazepam (RESTORIL) 15 MG capsule, Take 1 capsule (15 mg) by mouth nightly as needed for sleep    No current facility-administered medications on file prior to visit.      Allergies   Allergen Reactions     No Known Drug Allergies        Social History     Occupational History     Employer: DISABLED     Comment: not working    Tobacco Use     Smoking status: Current Every Day Smoker     Packs/day: 1.00     Years: 45.00     Pack years: 45.00     Types: Cigarettes     Smokeless tobacco: Never Used     Tobacco comment:  wants to quit   Vaping Use     Vaping Use: Never used   Substance and Sexual Activity     Alcohol use: Yes     Alcohol/week: 6.0 standard drinks     Types: 6 Cans of beer per week     Comment: 4-6 per day     Drug use: Yes      Types: Marijuana     Sexual activity: Yes     Partners: Female       Family History   Problem Relation Age of Onset     Arthritis Father      Depression Father      Heart Failure Father 80        CHF     Respiratory Mother         emphysema     Diabetes Mother      Other - See Comments Brother 21         in an MVA (oldest sibling     Other - See Comments Brother         older  all other siblings are 1/2      Other - See Comments Brother         older     Heart Disease Brother      Other - See Comments Brother         older     Other - See Comments Brother         older     Other - See Comments Brother         younger     Other - See Comments Brother 21        motorcycle injury, Brain death     Other - See Comments Brother         full sibling but given up for adoption 1 yr older     Other - See Comments Grandchild         3 grand daughters 1 grand son       REVIEW OF SYSTEMS  General: negative for, night sweats, dizziness, fatigue  Resp: No shortness of breath and no cough  CV: negative for chest pain, syncope or near-syncope  GI: negative for nausea, vomiting and diarrhea  : negative for dysuria and hematuria  Musculoskeletal: as above  Neurologic: negative for syncope   Hematologic: negative for bleeding disorder    Physical Exam:  Vitals: There were no vitals taken for this visit.  BMI= There is no height or weight on file to calculate BMI.  Constitutional: healthy, alert and no acute distress   Psychiatric: mentation appears normal and affect normal/bright  NEURO: no focal deficits, CMS intact left upper extremity   RESP: Normal with easy respirations and no use of accessory muscles to breathe, no audible wheezing or retractions  CV: +2 radial pulse and his hand is warm to palpation.   SKIN: Incisions look excellent and well-healed.  No erythema drainage or ecchymosis around the incisions and they are completely healed.  The rest of the shoulder with no erythema, rashes, excoriation, or breakdown. No  evidence of infection.   MUSCULOSKELETAL:    INSPECTION of left shoulder: No gross deformities, erythema, edema, ecchymosis, atrophy or fasciculations.     PALPATION: No tenderness AC joint, proximal biceps tendon, clavicle, lateral shoulder, posterior shoulder, trapezius area. No increased warmth noted.     ROM: Forward flexion to 145 compared to 180 on the contralateral side, abduction to 150 compared to 180 on the contralateral side, external rotation to 50 and symmetrical, internal rotation to lumbar spine.  All range of motion is without catching locking or major pain except maximal forward flexion and abduction     STRENGTH: 5 out of 5 , 5/5 nternal and -4 out of 5 external rotation     SPECIAL TEST: none today  GAIT: non-antalgic  Lymph: no palpable lymph nodes    Diagnostic Modalities:  No radiographs necessary today      Impression: 1.    1 month and 16 days status post left shoulder arthroscopy with biceps tenotomy and then open rotator cuff repair by Dr. Guerin    FOCUSED PLAN:   Patient doing well today and admits progress is happening but it slow.  He is working doing his own home exercise program as well as formal physical therapy PT: Doing it at [facility] in [place].  He is focusing on range of motion right now.  I did encourage him to take some pain medication prior to his therapy sessions.  He is off narcotics now but he could take some Tylenol or ibuprofen or Toradol or Zanaflex prior to his sessions which might help.  He understands.  Continue formal therapy.  I did give him a prescription of Toradol today.  Follow-up at 3 months status post surgery with me.    Re-x-ray on return: No      This note was dictated with [employer].    Tyshawn Barajas PA-C              Again, thank you for allowing me to participate in the care of your patient.        Sincerely,        Tyshawn Barajas PA-C

## 2021-09-27 NOTE — PROGRESS NOTES
Orthopedic Clinic Post-Operative Note    CHIEF COMPLAINT:   Chief Complaint   Patient presents with     RECHECK     left shoulder follow up [facility].C      Surgical Followup     DOS: 8/11/2021~ARTHROTOMY, SHOULDER, WITH ROTATOR CUFF REPAIR.  Left shoulder arthroscopy first.~6 weeks postop       HISTORY OF PRESENT ILLNESS  Location: Left shoulder  Rating of Pain: Moderate to mild  Pain is better with: Toradol and rest and sometimes prednisone or Zanaflex  Pain improving overall: Yes  Associated Features: Denies numbness or tingling or shooting burning or electric pain.  Patient concerns: Try to work as hard as he can with therapy and hoping to make progress.  He is doing exercises on his own at home.  Additional History: He is doing his formal physical therapy in [place] at [facility].  He is working hard there.  Patient is off narcotics.  He only takes Toradol when he absolutely needs to.    Patient's past medical, surgical, social and family histories reviewed.     Past Medical History:   Diagnosis Date     Arthritis      Lumbago      Major depression in complete remission (H) 8/30/2012     Severe Depression [296.33] 6/18/2009     Sleep apnea      Tobacco use disorder        Past Surgical History:   Procedure Laterality Date     ARTHROSCOPY KNEE WITH MEDIAL MENISCECTOMY Right 2/24/2020    Procedure: ARTHROSCOPY, KNEE, WITH MEDIAL MENISCECTOMY right;  Surgeon: Eliot Bañuelos MD;  [facility]     ARTHROSCOPY SHOULDER Left 8/11/2021    Procedure: Arthroscopy shoulder left ;  Surgeon: Fercho Guerin DO;  [facility]     ARTHROTOMY SHOULDER, ROTATOR CUFF REPAIR, COMBINED Left 8/11/2021    Procedure: ARTHROTOMY, SHOULDER, WITH ROTATOR CUFF REPAIR;  Surgeon: Fercho Guerin DO;  [facility]     BACK SURGERY  11/2015    cleaned out bone spurs     C ECHO HEART XTHORACIC,COMPLETE, W/O DOPPLER  02/04/07    normal cardiac stress echo test     C ESTEBAN W/O FACETEC FORAMOT/[facility] 1/2 VRT SEG, LUMBAR  1/02     L4-5     C REPAIR LUMBAR HERNIA  1986    L5-4 L5-S1     COLONOSCOPY N/A 2/18/2020    Procedure: Colonoscopy;  Surgeon: Juan Beltrán DO;  [facility]     HC REMOVE TONSILS/ADENOIDS,<11 Y/O      T & A <11y.o.     LAMINECT/DISCECTOMY, LUMBAR  10/19/06    L3-4     LAMINECT/DISCECTOMY, LUMBAR  11/10    left sided L4-5     REMOVAL OF SPERM DUCT(S)      Vasectomy     ROTATOR CUFF REPAIR RT/LT  3/12    right sided     ZZHC COLONOSCOPY W BIOPSY  11/05/09       Medications:  ketorolac (TORADOL) 10 MG tablet, Take 1 tablet (10 mg) by mouth every 6 hours as needed for moderate pain  latanoprost (XALATAN) 0.005 % ophthalmic solution, INSTILL 1 DROP INTO EACH EYE EVERY DAY AT BEDTIME  oxyCODONE-acetaminophen (PERCOCET) 5-325 MG tablet, Take 1 tablet by mouth every 6 hours as needed for severe pain   predniSONE (DELTASONE) 10 MG tablet, Take 40 mg po daily for 4-7 days when there is a flair of muscle spasms in the low back region  temazepam (RESTORIL) 15 MG capsule, Take 1 capsule (15 mg) by mouth nightly as needed for sleep    No current facility-administered medications on file prior to visit.      Allergies   Allergen Reactions     No Known Drug Allergies        Social History     Occupational History     Employer: DISABLED     Comment: not working    Tobacco Use     Smoking status: Current Every Day Smoker     Packs/day: 1.00     Years: 45.00     Pack years: 45.00     Types: Cigarettes     Smokeless tobacco: Never Used     Tobacco comment:  wants to quit   Vaping Use     Vaping Use: Never used   Substance and Sexual Activity     Alcohol use: Yes     Alcohol/week: 6.0 standard drinks     Types: 6 Cans of beer per week     Comment: 4-6 per day     Drug use: Yes     Types: Marijuana     Sexual activity: Yes     Partners: Female       Family History   Problem Relation Age of Onset     Arthritis Father      Depression Father      Heart Failure Father 80        CHF     Respiratory Mother         emphysema      Diabetes Mother      Other - See Comments Brother 21         in an MVA (oldest sibling     Other - See Comments Brother         older  all other siblings are 1/2      Other - See Comments Brother         older     Heart Disease Brother      Other - See Comments Brother         older     Other - See Comments Brother         older     Other - See Comments Brother         younger     Other - See Comments Brother 21        motorcycle injury, Brain death     Other - See Comments Brother         full sibling but given up for adoption 1 yr older     Other - See Comments Grandchild         3 grand daughters 1 grand son       REVIEW OF SYSTEMS  General: negative for, night sweats, dizziness, fatigue  Resp: No shortness of breath and no cough  CV: negative for chest pain, syncope or near-syncope  GI: negative for nausea, vomiting and diarrhea  : negative for dysuria and hematuria  Musculoskeletal: as above  Neurologic: negative for syncope   Hematologic: negative for bleeding disorder    Physical Exam:  Vitals: There were no vitals taken for this visit.  BMI= There is no height or weight on file to calculate BMI.  Constitutional: healthy, alert and no acute distress   Psychiatric: mentation appears normal and affect normal/bright  NEURO: no focal deficits, CMS intact left upper extremity   RESP: Normal with easy respirations and no use of accessory muscles to breathe, no audible wheezing or retractions  CV: +2 radial pulse and his hand is warm to palpation.   SKIN: Incisions look excellent and well-healed.  No erythema drainage or ecchymosis around the incisions and they are completely healed.  The rest of the shoulder with no erythema, rashes, excoriation, or breakdown. No evidence of infection.   MUSCULOSKELETAL:    INSPECTION of left shoulder: No gross deformities, erythema, edema, ecchymosis, atrophy or fasciculations.     PALPATION: No tenderness AC joint, proximal biceps tendon, clavicle, lateral shoulder,  posterior shoulder, trapezius area. No increased warmth noted.     ROM: Forward flexion to 145 compared to 180 on the contralateral side, abduction to 150 compared to 180 on the contralateral side, external rotation to 50 and symmetrical, internal rotation to lumbar spine.  All range of motion is without catching locking or major pain except maximal forward flexion and abduction     STRENGTH: 5 out of 5 , 5/5 nternal and -4 out of 5 external rotation     SPECIAL TEST: none today  GAIT: non-antalgic  Lymph: no palpable lymph nodes    Diagnostic Modalities:  No radiographs necessary today      Impression: 1.    1 month and 16 days status post left shoulder arthroscopy with biceps tenotomy and then open rotator cuff repair by Dr. Guerin    FOCUSED PLAN:   Patient doing well today and admits progress is happening but it slow.  He is working doing his own home exercise program as well as formal physical therapy PT: Doing it at [facility] in [place].  He is focusing on range of motion right now.  I did encourage him to take some pain medication prior to his therapy sessions.  He is off narcotics now but he could take some Tylenol or ibuprofen or Toradol or Zanaflex prior to his sessions which might help.  He understands.  Continue formal therapy.  I did give him a prescription of Toradol today.  Follow-up at 3 months status post surgery with me.    Re-x-ray on return: No      This note was dictated with [email or web address].    Tyshawn Barajas PA-C

## 2021-11-15 ENCOUNTER — OFFICE VISIT (OUTPATIENT)
Dept: ORTHOPEDICS | Facility: CLINIC | Age: 61
End: 2021-11-15
Payer: COMMERCIAL

## 2021-11-15 VITALS
SYSTOLIC BLOOD PRESSURE: 120 MMHG | BODY MASS INDEX: 19.9 KG/M2 | HEIGHT: 78 IN | DIASTOLIC BLOOD PRESSURE: 70 MMHG | WEIGHT: 172 LBS

## 2021-11-15 DIAGNOSIS — Z48.89 AFTERCARE FOLLOWING SURGERY: ICD-10-CM

## 2021-11-15 DIAGNOSIS — Z98.890 S/P LEFT ROTATOR CUFF REPAIR: Primary | ICD-10-CM

## 2021-11-15 DIAGNOSIS — Z98.890 S/P ARTHROSCOPY OF LEFT SHOULDER: ICD-10-CM

## 2021-11-15 PROCEDURE — 99213 OFFICE O/P EST LOW 20 MIN: CPT | Performed by: PHYSICIAN ASSISTANT

## 2021-11-15 RX ORDER — KETOROLAC TROMETHAMINE 10 MG/1
10 TABLET, FILM COATED ORAL EVERY 6 HOURS PRN
Qty: 30 TABLET | Refills: 0 | Status: SHIPPED | OUTPATIENT
Start: 2021-11-15 | End: 2022-11-01

## 2021-11-15 ASSESSMENT — MIFFLIN-ST. JEOR: SCORE: 1705.5

## 2021-11-15 NOTE — PROGRESS NOTES
Orthopedic Clinic Post-Operative Note    CHIEF COMPLAINT:   Chief Complaint   Patient presents with     Left Shoulder - Surgical Followup       HISTORY OF PRESENT ILLNESS  Location: Left shoulder, posterior lateral shoulder  Rating of Pain: Mild  Pain is better with: Rest  Pain improving overall: Yes  Associated Features: Patient denies any numbness or tingling or radicular type symptoms.  Patient concerns: None.  Patient feels he is making slow but steady progress.  Additional History: Patient continues to work with physical therapy and make slow gains.  Patient did not  his last prescription of Toradol but if this does help him so he can be more effective with therapy.  He would like 1 more prescription of this.  Patient has been working    Patient's past medical, surgical, social and family histories reviewed.     Past Medical History:   Diagnosis Date     Arthritis      Lumbago      Major depression in complete remission (H) 8/30/2012     Severe Depression [296.33] 6/18/2009     Sleep apnea      Tobacco use disorder        Past Surgical History:   Procedure Laterality Date     ARTHROSCOPY KNEE WITH MEDIAL MENISCECTOMY Right 2/24/2020    Procedure: ARTHROSCOPY, KNEE, WITH MEDIAL MENISCECTOMY right;  Surgeon: Eliot Bañuelos MD;  [facility]     ARTHROSCOPY SHOULDER Left 8/11/2021    Procedure: Arthroscopy shoulder left ;  Surgeon: Fercho Guerin DO;  [facility]     ARTHROTOMY SHOULDER, ROTATOR CUFF REPAIR, COMBINED Left 8/11/2021    Procedure: ARTHROTOMY, SHOULDER, WITH ROTATOR CUFF REPAIR;  Surgeon: Fercho Guerin DO;  [facility]     BACK SURGERY  11/2015    cleaned out bone spurs     C ECHO HEART XTHORACIC,COMPLETE, W/O DOPPLER  02/04/07    normal cardiac stress echo test     C ESTEBAN W/O FACETEC FORAMOT/[facility] 1/2 VRT SEG, LUMBAR  1/02    L4-5     C REPAIR LUMBAR HERNIA  1986    L5-4 L5-S1     COLONOSCOPY N/A 2/18/2020    Procedure: Colonoscopy;  Surgeon: Juan Beltrán DO;   [facility]     HC REMOVE TONSILS/ADENOIDS,<13 Y/O      T & A <11y.o.     LAMINECT/DISCECTOMY, LUMBAR  10/19/06    L3-4     LAMINECT/DISCECTOMY, LUMBAR  11/10    left sided L4-5     REMOVAL OF SPERM DUCT(S)      Vasectomy     ROTATOR CUFF REPAIR RT/LT  3/12    right sided     ZZHC COLONOSCOPY W BIOPSY  09       Medications:  latanoprost (XALATAN) 0.005 % ophthalmic solution, INSTILL 1 DROP INTO EACH EYE EVERY DAY AT BEDTIME  temazepam (RESTORIL) 15 MG capsule, Take 1 capsule (15 mg) by mouth nightly as needed for sleep  oxyCODONE-acetaminophen (PERCOCET) 5-325 MG tablet, Take 1 tablet by mouth every 6 hours as needed for severe pain  (Patient not taking: Reported on 2021)  predniSONE (DELTASONE) 10 MG tablet, Take 40 mg po daily for 4-7 days when there is a flair of muscle spasms in the low back region (Patient not taking: Reported on 2021)    No current facility-administered medications on file prior to visit.      Allergies   Allergen Reactions     No Known Drug Allergies        Social History     Occupational History     Employer: DISABLED     Comment: not working    Tobacco Use     Smoking status: Current Every Day Smoker     Packs/day: 1.00     Years: 45.00     Pack years: 45.00     Types: Cigarettes     Smokeless tobacco: Never Used     Tobacco comment:  wants to quit   Vaping Use     Vaping Use: Never used   Substance and Sexual Activity     Alcohol use: Yes     Alcohol/week: 6.0 standard drinks     Types: 6 Cans of beer per week     Comment: 4-6 per day     Drug use: Yes     Types: Marijuana     Sexual activity: Yes     Partners: Female       Family History   Problem Relation Age of Onset     Arthritis Father      Depression Father      Heart Failure Father 80        CHF     Respiratory Mother         emphysema     Diabetes Mother      Other - See Comments Brother 21         in an MVA (oldest sibling     Other - See Comments Brother         older  all other siblings are 1/2      Other  "- See Comments Brother         older     Heart Disease Brother      Other - See Comments Brother         older     Other - See Comments Brother         older     Other - See Comments Brother         younger     Other - See Comments Brother 21        motorcycle injury, Brain death     Other - See Comments Brother         full sibling but given up for adoption 1 yr older     Other - See Comments Grandchild         3 grand daughters 1 grand son       REVIEW OF SYSTEMS  General: negative for, night sweats, dizziness, fatigue  Resp: No shortness of breath and no cough  CV: negative for chest pain, syncope or near-syncope  GI: negative for nausea, vomiting and diarrhea  : negative for dysuria and hematuria  Musculoskeletal: as above  Neurologic: negative for syncope   Hematologic: negative for bleeding disorder    Physical Exam:  Vitals: /70   Ht 1.969 m (6' 5.5\")   Wt 78 kg (172 lb)   BMI 20.13 kg/m    BMI= Body mass index is 20.13 kg/m .  Constitutional: healthy, alert and no acute distress   Psychiatric: mentation appears normal and affect normal/bright  NEURO: no focal deficits, CMS intact left upper extremity   RESP: Normal with easy respirations and no use of accessory muscles to breathe, no audible wheezing or retractions  CV: +2 radial pulse and his hand is warm to palpation.   SKIN: No erythema, rashes, excoriation, or breakdown. No evidence of infection.  Did not view the incisions today.  MUSCULOSKELETAL:    INSPECTION of left shoulder: No gross deformities    PALPATION: No tenderness AC joint, proximal biceps tendon, clavicle, lateral shoulder, posterior shoulder, trapezius area. No increased warmth noted.     ROM: Active: Forward flexion to 145 degrees, abduction to 90, external rotation to 90, internal rotation to lumbar spine.  All range of motion is without catching locking or pain.     STRENGTH: 5 out of 5 , deltoid as well as internal and external rotation.  Negative 4 out of 5 " supraspinatus tested with empty can test.    SPECIAL TEST: Patient has a, negative empty can and full can test but the empty can did have some weakness and pain lateral shoulder, negative external rotator lag sign, negative drop test   GAIT: non-antalgic  Lymph: no palpable lymph nodes    Diagnostic Modalities:  No radiographs necessary today      Impression: 1.  3 months in 4 days status post left shoulder arthroscopy with biceps tenotomy and then open rotator cuff repair by Dr. Guerin    FOCUSED PLAN:   Patient is making slow steady progress.  Printed out new order for physical therapy hoping they can continue to work on his progress.  Patient educated to continue to do the exercises at home and he can continue this indefinitely.  Toradol 30 tablets prescribed as he did not  his last prescription because of financial issues.  Patient can follow-up on an as-needed basis or if he has some issues or questions he could follow-up with me in 3 months.    Re-x-ray on return: No    This note was dictated with [email or web address].    Tyshawn Barajas PA-C

## 2021-11-15 NOTE — LETTER
11/15/2021         RE: Gomez Gutierrez  [phone]  [facility] [address]        Dear Colleague,    Thank you for referring your patient, Gomez Gutierrez, to the [facility]. Please see a copy of my visit note below.    Orthopedic Clinic Post-Operative Note    CHIEF COMPLAINT:   Chief Complaint   Patient presents with     Left Shoulder - Surgical Followup       HISTORY OF PRESENT ILLNESS  Location: Left shoulder, posterior lateral shoulder  Rating of Pain: Mild  Pain is better with: Rest  Pain improving overall: Yes  Associated Features: Patient denies any numbness or tingling or radicular type symptoms.  Patient concerns: None.  Patient feels he is making slow but steady progress.  Additional History: Patient continues to work with physical therapy and make slow gains.  Patient did not  his last prescription of Toradol but if this does help him so he can be more effective with therapy.  He would like 1 more prescription of this.  Patient has been working    Patient's past medical, surgical, social and family histories reviewed.     Past Medical History:   Diagnosis Date     Arthritis      Lumbago      Major depression in complete remission (H) 8/30/2012     Severe Depression [296.33] 6/18/2009     Sleep apnea      Tobacco use disorder        Past Surgical History:   Procedure Laterality Date     ARTHROSCOPY KNEE WITH MEDIAL MENISCECTOMY Right 2/24/2020    Procedure: ARTHROSCOPY, KNEE, WITH MEDIAL MENISCECTOMY right;  Surgeon: Eliot Bañuelos MD;  Location: [facility] OR     ARTHROSCOPY SHOULDER Left 8/11/2021    Procedure: Arthroscopy shoulder left ;  Surgeon: Fercho Guerin DO;  Location: [facility] OR     ARTHROTOMY SHOULDER, ROTATOR CUFF REPAIR, COMBINED Left 8/11/2021    Procedure: ARTHROTOMY, SHOULDER, WITH ROTATOR CUFF REPAIR;  Surgeon: Fercho Guerin DO;  Location: [facility] OR     BACK SURGERY  11/2015    cleaned out bone spurs     C ECHO HEART XTHORACIC,COMPLETE, W/O DOPPLER  02/04/07     normal cardiac stress echo test     C ESTEBAN W/O FACETEC FORAMOT/[facility] 1/2 VRT SEG, LUMBAR  1/02    L4-5     C REPAIR LUMBAR HERNIA  1986    L5-4 L5-S1     COLONOSCOPY N/A 2/18/2020    Procedure: Colonoscopy;  Surgeon: Juan Beltrán DO;  Location: [facility] GI     HC REMOVE TONSILS/ADENOIDS,<11 Y/O      T & A <11y.o.     LAMINECT/DISCECTOMY, LUMBAR  10/19/06    L3-4     LAMINECT/DISCECTOMY, LUMBAR  11/10    left sided L4-5     REMOVAL OF SPERM DUCT(S)      Vasectomy     ROTATOR CUFF REPAIR RT/LT  3/12    right sided     ZZHC COLONOSCOPY W BIOPSY  11/05/09       Medications:  latanoprost (XALATAN) 0.005 % ophthalmic solution, INSTILL 1 DROP INTO EACH EYE EVERY DAY AT BEDTIME  temazepam (RESTORIL) 15 MG capsule, Take 1 capsule (15 mg) by mouth nightly as needed for sleep  oxyCODONE-acetaminophen (PERCOCET) 5-325 MG tablet, Take 1 tablet by mouth every 6 hours as needed for severe pain  (Patient not taking: Reported on 9/27/2021)  predniSONE (DELTASONE) 10 MG tablet, Take 40 mg po daily for 4-7 days when there is a flair of muscle spasms in the low back region (Patient not taking: Reported on 9/27/2021)    No current facility-administered medications on file prior to visit.      Allergies   Allergen Reactions     No Known Drug Allergies        Social History     Occupational History     Employer: DISABLED     Comment: not working    Tobacco Use     Smoking status: Current Every Day Smoker     Packs/day: 1.00     Years: 45.00     Pack years: 45.00     Types: Cigarettes     Smokeless tobacco: Never Used     Tobacco comment:  wants to quit   Vaping Use     Vaping Use: Never used   Substance and Sexual Activity     Alcohol use: Yes     Alcohol/week: 6.0 standard drinks     Types: 6 Cans of beer per week     Comment: 4-6 per day     Drug use: Yes     Types: Marijuana     Sexual activity: Yes     Partners: Female       Family History   Problem Relation Age of Onset     Arthritis Father      Depression Father      Heart  "Failure Father 80        CHF     Respiratory Mother         emphysema     Diabetes Mother      Other - See Comments Brother 21         in an MVA (oldest sibling     Other - See Comments Brother         older  all other siblings are 1/2      Other - See Comments Brother         older     Heart Disease Brother      Other - See Comments Brother         older     Other - See Comments Brother         older     Other - See Comments Brother         younger     Other - See Comments Brother 21        motorcycle injury, Brain death     Other - See Comments Brother         full sibling but given up for adoption 1 yr older     Other - See Comments Grandchild         3 grand daughters 1 grand son       REVIEW OF SYSTEMS  General: negative for, night sweats, dizziness, fatigue  Resp: No shortness of breath and no cough  CV: negative for chest pain, syncope or near-syncope  GI: negative for nausea, vomiting and diarrhea  : negative for dysuria and hematuria  Musculoskeletal: as above  Neurologic: negative for syncope   Hematologic: negative for bleeding disorder    Physical Exam:  Vitals: /70   Ht 1.969 m (6' 5.5\")   Wt 78 kg (172 lb)   BMI 20.13 kg/m    BMI= Body mass index is 20.13 kg/m .  Constitutional: healthy, alert and no acute distress   Psychiatric: mentation appears normal and affect normal/bright  NEURO: no focal deficits, CMS intact left upper extremity   RESP: Normal with easy respirations and no use of accessory muscles to breathe, no audible wheezing or retractions  CV: +2 radial pulse and his hand is warm to palpation.   SKIN: No erythema, rashes, excoriation, or breakdown. No evidence of infection.  Did not view the incisions today.  MUSCULOSKELETAL:    INSPECTION of left shoulder: No gross deformities    PALPATION: No tenderness AC joint, proximal biceps tendon, clavicle, lateral shoulder, posterior shoulder, trapezius area. No increased warmth noted.     ROM: Active: Forward flexion to 145 " degrees, abduction to 90, external rotation to 90, internal rotation to lumbar spine.  All range of motion is without catching locking or pain.     STRENGTH: 5 out of 5 , deltoid as well as internal and external rotation.  Negative 4 out of 5 supraspinatus tested with empty can test.    SPECIAL TEST: Patient has a, negative empty can and full can test but the empty can did have some weakness and pain lateral shoulder, negative external rotator lag sign, negative drop test   GAIT: non-antalgic  Lymph: no palpable lymph nodes    Diagnostic Modalities:  No radiographs necessary today      Impression: 1.  3 months in 4 days status post left shoulder arthroscopy with biceps tenotomy and then open rotator cuff repair by Dr. Guerin    FOCUSED PLAN:   Patient is making slow steady progress.  Printed out new order for physical therapy hoping they can continue to work on his progress.  Patient educated to continue to do the exercises at home and he can continue this indefinitely.  Toradol 30 tablets prescribed as he did not  his last prescription because of financial issues.  Patient can follow-up on an as-needed basis or if he has some issues or questions he could follow-up with me in 3 months.    Re-x-ray on return: No    This note was dictated with [employer].    Tyshawn Barajas PA-C              Again, thank you for allowing me to participate in the care of your patient.        Sincerely,        Tyshawn Barajas PA-C

## 2021-12-15 NOTE — TELEPHONE ENCOUNTER
Type of surgery: Arthrotomy, shoulder, with rotator cuff repair, left shoulder arthroscopy first left /worker comp  Location of surgery: [facility] OR  Date and time of surgery: 08/11/2021  Surgeon: Fercho Guerin  Pre-Op Appt Date: 08/09/2021  Post-Op Appt Date: 08/23/2021   Packet sent out: Yes /mychart   Pre-cert/Authorization completed:  No  Date:   
No

## 2022-04-18 ENCOUNTER — OFFICE VISIT (OUTPATIENT)
Dept: FAMILY MEDICINE | Facility: CLINIC | Age: 62
End: 2022-04-18
Payer: COMMERCIAL

## 2022-04-18 VITALS
RESPIRATION RATE: 18 BRPM | DIASTOLIC BLOOD PRESSURE: 62 MMHG | BODY MASS INDEX: 19.09 KG/M2 | OXYGEN SATURATION: 100 % | SYSTOLIC BLOOD PRESSURE: 120 MMHG | TEMPERATURE: 98 F | HEART RATE: 74 BPM | WEIGHT: 165 LBS | HEIGHT: 78 IN

## 2022-04-18 DIAGNOSIS — Z00.00 ENCOUNTER FOR MEDICARE ANNUAL WELLNESS EXAM: Primary | ICD-10-CM

## 2022-04-18 DIAGNOSIS — Z23 NEED FOR TD VACCINE: ICD-10-CM

## 2022-04-18 DIAGNOSIS — Z23 NEED FOR SHINGLES VACCINE: ICD-10-CM

## 2022-04-18 DIAGNOSIS — Z87.891 PERSONAL HISTORY OF TOBACCO USE: ICD-10-CM

## 2022-04-18 PROBLEM — F10.930 ALCOHOL WITHDRAWAL SYNDROME WITHOUT COMPLICATION (H): Status: RESOLVED | Noted: 2021-05-18 | Resolved: 2022-04-18

## 2022-04-18 PROCEDURE — G0296 VISIT TO DETERM LDCT ELIG: HCPCS | Performed by: FAMILY MEDICINE

## 2022-04-18 PROCEDURE — 90714 TD VACC NO PRESV 7 YRS+ IM: CPT | Performed by: FAMILY MEDICINE

## 2022-04-18 PROCEDURE — 99396 PREV VISIT EST AGE 40-64: CPT | Mod: 25 | Performed by: FAMILY MEDICINE

## 2022-04-18 PROCEDURE — 90471 IMMUNIZATION ADMIN: CPT | Performed by: FAMILY MEDICINE

## 2022-04-18 ASSESSMENT — ENCOUNTER SYMPTOMS
HEADACHES: 0
SHORTNESS OF BREATH: 0
DYSURIA: 0
HEMATOCHEZIA: 0
PARESTHESIAS: 0
HEARTBURN: 0
JOINT SWELLING: 0
ARTHRALGIAS: 0
NAUSEA: 0
HEMATURIA: 0
EYE PAIN: 0
DIARRHEA: 0
SORE THROAT: 1
WEAKNESS: 0
ABDOMINAL PAIN: 0
PALPITATIONS: 0
FREQUENCY: 0
COUGH: 1
FEVER: 0
MYALGIAS: 0
CONSTIPATION: 0
NERVOUS/ANXIOUS: 0
CHILLS: 0
DIZZINESS: 0

## 2022-04-18 ASSESSMENT — ACTIVITIES OF DAILY LIVING (ADL): CURRENT_FUNCTION: NO ASSISTANCE NEEDED

## 2022-04-18 ASSESSMENT — PAIN SCALES - GENERAL: PAINLEVEL: NO PAIN (0)

## 2022-04-18 NOTE — PATIENT INSTRUCTIONS
Lung Cancer Screening   Frequently Asked Questions  If you are at high-risk for lung cancer, getting screened with low-dose computed tomography (LDCT) every year can help save your life. This handout offers answers to some of the most common questions about lung cancer screening. If you have other questions, please call [phone] ([phone]).     What is it?  Lung cancer screening uses special X-ray technology to create an image of your lung tissue. The exam is quick and easy and takes less than 10 seconds. We don t give you any medicine or use any needles. You can eat before and after the exam. You don t need to change your clothes as long as the clothing on your chest doesn t contain metal. But, you do need to be able to hold your breath for at least 6 seconds during the exam.    What is the goal of lung cancer screening?  The goal of lung cancer screening is to save lives. Many times, lung cancer is not found until a person starts having physical symptoms. Lung cancer screening can help detect lung cancer in the earliest stages when it may be easier to treat.    Who should be screened for lung cancer?  We suggest lung cancer screening for anyone who is at high-risk for lung cancer. You are in the high-risk group if you:      are between the ages of 55 and 79, and    have smoked at least 1 pack of cigarettes a day for 20 or more years, and    still smoke or have quit within the past 15 years.    However, if you have a new cough or shortness of breath, you should talk to your doctor before being screened.    Why does it matter if I have symptoms?  Certain symptoms can be a sign that you have a condition in your lungs that should be checked and treated by your doctor. These symptoms include fever, chest pain, a new or changing cough, shortness of breath that you have never felt before, coughing up blood or unexplained weight loss. Having any of these symptoms can greatly affect the results of lung  cancer screening.       Should all smokers get an LDCT lung cancer screening exam?  It depends. Lung cancer screening is for a very specific group of men and women who have a history of heavy smoking over a long period of time (see  Who should be screened for lung cancer  above).  I am in the high-risk group, but have been diagnosed with cancer in the past. Is LDCT lung cancer screening right for me?  In some cases, you should not have LDCT lung screening, such as when your doctor is already following your cancer with CT scan studies. Your doctor will help you decide if LDCT lung screening is right for you.  Do I need to have a screening exam every year?  Yes. If you are in the high-risk group described earlier, you should get an LDCT lung cancer screening exam every year until you are 79, or are no longer willing or able to undergo screening and possible procedures to diagnose and treat lung cancer.  How effective is LDCT at preventing death from lung cancer?  Studies have shown that LDCT lung cancer screening can lower the risk of death from lung cancer by 20 percent in people who are at high-risk.  What are the risks?  There are some risks and limitations of LDCT lung cancer screening. We want to make sure you understand the risks and benefits, so please let us know if you have any questions. Your doctor may want to talk with you more about these risks.    Radiation exposure: As with any exam that uses radiation, there is a very small increased risk of cancer. The amount of radiation in LDCT is small--about the same amount a person would get from a mammogram. Your doctor orders the exam when he or she feels the potential benefits outweigh the risks.    False negatives: No test is perfect, including LDCT. It is possible that you may have a medical condition, including lung cancer, that is not found during your exam. This is called a false negative result.    False positives and more testing: LDCT very often finds  something in the lung that could be cancer, but in fact is not. This is called a false positive result. False positive tests often cause anxiety. To make sure these findings are not cancer, you may need to have more tests. These tests will be done only if you give us permission. Sometimes patients need a treatment that can have side effects, such as a biopsy. For more information on false positives, see  What can I expect from the results?     Findings not related to lung cancer: Your LDCT exam also takes pictures of areas of your body next to your lungs. In a very small number of cases, the CT scan will show an abnormal finding in one of these areas, such as your kidneys, adrenal glands, liver or thyroid. This finding may not be serious, but you may need more tests. Your doctor can help you decide what other tests you may need, if any.  What can I expect from the results?  About 1 out of 4 LDCT exams will find something that may need more tests. Most of the time, these findings are lung nodules. Lung nodules are very small collections of tissue in the lung. These nodules are very common, and the vast majority--more than 97 percent--are not cancer (benign). Most are normal lymph nodes or small areas of scarring from past infections.  But, if a small lung nodule is found to be cancer, the cancer can be cured more than 90 percent of the time. To know if the nodule is cancer, we may need to get more images before your next yearly screening exam. If the nodule has suspicious features (for example, it is large, has an odd shape or grows over time), we will refer you to a specialist for further testing.  Will my doctor also get the results?  Yes. Your doctor will get a copy of your results.  Is it okay to keep smoking now that there s a cancer screening exam?  No. Tobacco is one of the strongest cancer-causing agents. It causes not only lung cancer, but other cancers and cardiovascular (heart) diseases as well. The damage  caused by smoking builds over time. This means that the longer you smoke, the higher your risk of disease. While it is never too late to quit, the sooner you quit, the better.  Where can I find help to quit smoking?  The best way to prevent lung cancer is to stop smoking. If you have already quit smoking, congratulations and keep it up! For help on quitting smoking, please call [employer] at 4-368-QUITNOW (1-287.538.1483) or the American [employer] Society at 1-771.430.1615 to find local resources near you.  One-on-one health coaching:  If you d prefer to work individually with a health care provider on tobacco cessation, we offer:      Medication Therapy Management:  Our specially trained pharmacists work closely with you and your doctor to help you quit smoking.  Call [phone] or [phone] (toll free).    Patient Education   Personalized Prevention Plan  You are due for the preventive services outlined below.  Your care team is available to assist you in scheduling these services.  If you have already completed any of these items, please share that information with your care team to update in your medical record.  Health Maintenance Due   Topic Date Due     URINE DRUG SCREEN  Never done     ANNUAL REVIEW OF HM ORDERS  Never done     HIV Screening  Never done     Hepatitis C Screening  Never done     Zoster (Shingles) Vaccine (2 of 2) 03/14/2019     LUNG CANCER SCREENING  01/23/2021       Exercise for a Healthier Heart  You may wonder how you can improve the health of your heart. If you re thinking about exercise, you re on the right track. You don t need to become an athlete. But you do need a certain amount of brisk exercise to help strengthen your heart. If you have been diagnosed with a heart condition, your healthcare provider may advise exercise to help stabilize your condition. To help make exercise a habit, choose safe, fun activities.      Exercise with a friend. When activity is fun, you're more  likely to stick with it.   Before you start  Check with your healthcare provider before starting an exercise program. This is especially important if you have not been active for a while. It's also important if you have a long-term (chronic) health problem such as heart disease, diabetes, or obesity. Or if you are at high risk for having these problems.   Why exercise?  Exercising regularly offers many healthy rewards. It can help you do all of the following:     Improve your blood cholesterol level to help prevent further heart trouble    Lower your blood pressure to help prevent a stroke or heart attack    Control diabetes, or reduce your risk of getting this disease    Improve your heart and lung function    Reach and stay at a healthy weight    Make your muscles stronger so you can stay active    Prevent falls and fractures by slowing the loss of bone mass (osteoporosis)    Manage stress better    Reduce your blood pressure    Improve your sense of self and your body image  Exercise tips      Ease into your routine. Set small goals. Then build on them. If you are not sure what your activity level should be, talk with your healthcare provider first before starting an exercise routine.    Exercise on most days. Aim for a total of 150 minutes (2 hours and 30 minutes) or more of moderate-intensity aerobic activity each week. Or 75 minutes (1 hour and 15 minutes) or more of vigorous-intensity aerobic activity each week. Or try for a combination of both. Moderate activity means that you breathe heavier and your heart rate increases but you can still talk. Think about doing 40 minutes of moderate exercise, 3 to 4 times a week. For best results, activity should last for about 40 minutes to lower blood pressure and cholesterol. It's OK to work up to the 40-minute period over time. Examples of moderate-intensity activity are walking 1 mile in 15 minutes. Or doing 30 to 45 minutes of yard work.    Step up your daily  activity level.  Along with your exercise program, try being more active the whole day. Walk instead of drive. Or park further away so that you take more steps each day. Do more household tasks or yard work. You may not be able to meet the advised mount of physical activity. But doing some moderate- or vigorous-intensity aerobic activity can help reduce your risk for heart disease. Your healthcare provider can help you figure out what is best for you.    Choose 1 or more activities you enjoy.  Walking is one of the easiest things you can do. You can also try swimming, riding a bike, dancing, or taking an exercise class.    When to call your healthcare provider  Call your healthcare provider if you have any of these:     Chest pain or feel dizzy or lightheaded    Burning, tightness, pressure, or heaviness in your chest, neck, shoulders, back, or arms    Abnormal shortness of breath    More joint or muscle pain    A very fast or irregular heartbeat (palpitations)  [employer] last reviewed this educational content on 7/1/2019 2000-2021 [employer]. All rights reserved. This information is not intended as a substitute for professional medical care. Always follow your healthcare professional's instructions.         Patient Education   Alcohol Use     Many people can enjoy a glass of wine or beer without any negative consequences to their health. According to the Centers for Disease Control and Prevention (CDC), having one or fewer drinks per day for women and two or fewer per day for men is considered moderate drinking.     When people drink more than moderately, it can become concerning. Excessive drinking is defined as consuming 15 drinks or more per week for men and 8 drinks or more per week for women. There are various health problems associated with excessive drinking, which include:    Damage to vital organs like the heart, brain, liver and pancreas    Harm to the digestive tract    Weaken the immune  system    Higher risk for heart disease and cancer    There are many resources available to people who are addicted to alcohol. A counselor or other health care provider can give you support. So can a , , or rabbi who is trained in substance abuse counseling. Friends and family may also help once you are connected with professionals.

## 2022-04-18 NOTE — PROGRESS NOTES
"SUBJECTIVE:   Gomez Gutierrez is a 61 year old male who presents for Preventive Visit.      Patient has been advised of split billing requirements and indicates understanding: Yes  Are you in the first 12 months of your Medicare coverage?      Healthy Habits:     In general, how would you rate your overall health?  Good    Frequency of exercise:  None    Do you usually eat at least 4 servings of fruit and vegetables a day, include whole grains    & fiber and avoid regularly eating high fat or \"junk\" foods?  Yes    Taking medications regularly:  Yes    Medication side effects:  None    Ability to successfully perform activities of daily living:  No assistance needed    Home Safety:  Throw rugs in the hallway and lighting is poor    Hearing Impairment:  No hearing concerns    In the past 6 months, have you been bothered by leaking of urine?  No    In general, how would you rate your overall mental or emotional health?  Good      PHQ-2 Total Score: 0    Additional concerns today:  No    Do you feel safe in your environment? Yes    Have you ever done Advance Care Planning? (For example, a Health Directive, POLST, or a discussion with a medical provider or your loved ones about your wishes): not discussed     Fall risk  Fallen 2 or more times in the past year?: No  Any fall with injury in the past year?: No    Cognitive Screening   1) Repeat 3 items (Leader, Season, Table)    2) Clock draw: NORMAL  3) 3 item recall: Recalls 2 objects   Results: NORMAL clock, 1-2 items recalled: COGNITIVE IMPAIRMENT LESS LIKELY    Mini-CogTM Copyright HAYLEY Servin. Licensed by the author for use in [facility]; reprinted with permission ([email or web address]@.[facility]). All rights reserved.      Do you have sleep apnea, excessive snoring or daytime drowsiness?: no    Reviewed and updated as needed this visit by clinical staff   Tobacco  Allergies  Meds   Med Hx  Surg Hx  Fam Hx  Soc Hx          Reviewed and updated as needed this visit by " Provider                   Social History     Tobacco Use     Smoking status: Current Every Day Smoker     Packs/day: 1.00     Years: 45.00     Pack years: 45.00     Types: Cigarettes     Smokeless tobacco: Never Used     Tobacco comment:  wants to quit   Substance Use Topics     Alcohol use: Yes     Alcohol/week: 6.0 standard drinks     Types: 6 Cans of beer per week     Comment: 4-6 per day       Alcohol Use 4/18/2022   Prescreen: >3 drinks/day or >7 drinks/week? Yes   Prescreen: >3 drinks/day or >7 drinks/week? -   AUDIT SCORE  7     PROBLEMS TO ADD ON...  Nothing new.  He is working part time as a  to get patients to the doctor/dental appts. He really enjoys this.  It gets him out of the house and he is able to do something with this time.  He has what a stress right now because his son is back in [facility] for up to 7 years for drug charges.  His son's 6-year-old son is living with them and they are trying to get permanent custody this is been very stressful for them and their son is very angry with them and is being very mean to his wife and her daughter.  The patient does not put up with his crap but his wife and daughter are triggers for his son's anger.    Current providers sharing in care for this patient include:   Patient Care Team:  Francis Lane MD as PCP - General  Francis Lane MD as Assigned PCP  Tyshawn Barajas PA-C as Assigned Musculoskeletal Provider    The following health maintenance items are reviewed in Epic and correct as of today:  Health Maintenance Due   Topic Date Due     URINE DRUG SCREEN  Never done     ANNUAL REVIEW OF  ORDERS  Never done     HIV SCREENING  Never done     HEPATITIS C SCREENING  Never done     ZOSTER IMMUNIZATION (2 of 2) 03/14/2019     DTAP/TDAP/TD IMMUNIZATION (2 - Td or Tdap) 10/05/2019     MEDICARE ANNUAL WELLNESS VISIT  01/17/2020     LUNG CANCER SCREENING  01/23/2021     Labs reviewed in EPIC  BP Readings from Last 3 Encounters:   04/18/22 120/62    11/15/21 120/70   09/27/21 120/72    Wt Readings from Last 3 Encounters:   04/18/22 74.8 kg (165 lb)   11/15/21 78 kg (172 lb)   09/27/21 75.5 kg (166 lb 8 oz)                  Patient Active Problem List   Diagnosis     Tobacco use disorder     Psychosexual dysfunction with inhibited sexual excitement     Other male erectile dysfunction     iamLUMBAR DISC DISPLACEMENT     iamPOSTSURGICAL STATES NEC     Obstructive sleep apnea     Neuropathic pain syndrome (non-herpetic)     CARDIOVASCULAR SCREENING; LDL GOAL LESS THAN 160     Foot drop, left     Lumbar radiculopathy     Chronic midline low back pain with sciatica, sciatica laterality unspecified     Personal history of nicotine dependence      Glaucoma suspect, bilateral     Discoloration of skin of hand (bilateral) hands turn white then red and purple when cold with burning sensation     Back muscle spasm     Acute medial meniscus tear of right knee, subsequent encounter     Traumatic complete tear of left rotator cuff     Bicipital tendonitis of left shoulder     Past Surgical History:   Procedure Laterality Date     ARTHROSCOPY KNEE WITH MEDIAL MENISCECTOMY Right 2/24/2020    Procedure: ARTHROSCOPY, KNEE, WITH MEDIAL MENISCECTOMY right;  Surgeon: Eliot Bañuelos MD;  Location:  OR     ARTHROSCOPY SHOULDER Left 8/11/2021    Procedure: Arthroscopy shoulder left ;  Surgeon: Fercho Guerin DO;  Location: [facility] OR     ARTHROTOMY SHOULDER, ROTATOR CUFF REPAIR, COMBINED Left 8/11/2021    Procedure: ARTHROTOMY, SHOULDER, WITH ROTATOR CUFF REPAIR;  Surgeon: Fercho Guerin DO;  Location:  OR     BACK SURGERY  11/2015    cleaned out bone spurs     COLONOSCOPY N/A 2/18/2020    Procedure: Colonoscopy;  Surgeon: Juan Beltrán DO;  Location:  GI     HC REMOVE TONSILS/ADENOIDS,<13 Y/O      T & A <11y.o.     LAMINECT/DISCECTOMY, LUMBAR  10/19/06    L3-4     LAMINECT/DISCECTOMY, LUMBAR  11/10    left sided L4-5     REMOVAL OF SPERM DUCT(S)      Vasectomy      ROTATOR CUFF REPAIR RT/LT  3/12    right sided     [facility] ECHO HEART XTHORACIC,COMPLETE, W/O DOPPLER  07    normal cardiac stress echo test     [facility] ESTEBAN W/O FACETEC FORAMOT/DSKC  VRT SEG, LUMBAR      L4-5     Z REPAIR LUMBAR HERNIA      L5-4 L5-S1     ZZ COLONOSCOPY W BIOPSY  09       Social History     Tobacco Use     Smoking status: Current Every Day Smoker     Packs/day: 1.00     Years: 45.00     Pack years: 45.00     Types: Cigarettes     Smokeless tobacco: Never Used     Tobacco comment:  wants to quit   Substance Use Topics     Alcohol use: Yes     Alcohol/week: 6.0 standard drinks     Types: 6 Cans of beer per week     Comment: 4-6 per day     Family History   Problem Relation Age of Onset     Arthritis Father      Depression Father      Heart Failure Father 80        CHF     Respiratory Mother         emphysema     Diabetes Mother      Other - See Comments Brother 21         in an MVA (oldest sibling     Other - See Comments Brother         older  all other siblings are 1/2      Other - See Comments Brother         older     Heart Disease Brother      Other - See Comments Brother         older     Other - See Comments Brother         older     Other - See Comments Brother         younger     Other - See Comments Brother 21        motorcycle injury, Brain death     Other - See Comments Brother         full sibling but given up for adoption 1 yr older     Other - See Comments Grandchild         3 grand daughters 1 grand son         Current Outpatient Medications   Medication Sig Dispense Refill     ketorolac (TORADOL) 10 MG tablet Take 1 tablet (10 mg) by mouth every 6 hours as needed for moderate pain 30 tablet 0     latanoprost (XALATAN) 0.005 % ophthalmic solution INSTILL 1 DROP INTO EACH EYE EVERY DAY AT BEDTIME       temazepam (RESTORIL) 15 MG capsule Take 1 capsule (15 mg) by mouth nightly as needed for sleep 30 capsule 5     Allergies   Allergen Reactions     No Known Drug  "Allergies      Recent Labs   Lab Test 05/17/21  1539 08/03/20  1029 12/10/18  1237   LDL  --   --  57   HDL  --   --  74   TRIG  --   --  43   ALT 33 33  --    CR  --  0.83 0.85   GFRESTIMATED  --  >90 >90   GFRESTBLACK  --  >90 >90   POTASSIUM  --  4.6 4.5      Pneumonia Vaccine:2019        Review of Systems   Constitutional: Negative for chills and fever.   HENT: Positive for ear pain and sore throat. Negative for congestion and hearing loss.    Eyes: Negative for pain and visual disturbance.   Respiratory: Positive for cough. Negative for shortness of breath.    Cardiovascular: Negative for chest pain, palpitations and peripheral edema.   Gastrointestinal: Negative for abdominal pain, constipation, diarrhea, heartburn, hematochezia and nausea.   Genitourinary: Positive for impotence. Negative for dysuria, frequency, genital sores, hematuria, penile discharge and urgency.   Musculoskeletal: Negative for arthralgias, joint swelling and myalgias.   Skin: Negative for rash.   Neurological: Negative for dizziness, weakness, headaches and paresthesias.   Psychiatric/Behavioral: Negative for mood changes. The patient is not nervous/anxious.      Constitutional, HEENT, cardiovascular, pulmonary, gi and gu systems are negative, except as otherwise noted.    OBJECTIVE:   /62   Pulse 74   Temp 98  F (36.7  C) (Temporal)   Resp 18   Ht 1.981 m (6' 6\")   Wt 74.8 kg (165 lb)   SpO2 100%   BMI 19.07 kg/m   Estimated body mass index is 19.07 kg/m  as calculated from the following:    Height as of this encounter: 1.981 m (6' 6\").    Weight as of this encounter: 74.8 kg (165 lb).  Physical Exam  GENERAL: healthy, alert and no distress  EYES: Eyes grossly normal to inspection, PERRL and conjunctivae and sclerae normal  HENT: ear canals and TM's normal, nose and mouth without ulcers or lesions  NECK: no adenopathy, no asymmetry, masses, or scars and thyroid normal to palpation  RESP: lungs clear to auscultation - no " rales, rhonchi or wheezes  CV: regular rate and rhythm, normal S1 S2, no S3 or S4, no murmur, click or rub, no peripheral edema and peripheral pulses strong  ABDOMEN: soft, nontender, no hepatosplenomegaly, no masses and bowel sounds normal  MS: no gross musculoskeletal defects noted, no edema  SKIN: no suspicious lesions or rashes  NEURO: Normal strength and tone, mentation intact and speech normal  PSYCH: mentation appears normal, affect normal/bright    Diagnostic Test Results:  Labs reviewed in Epic    ASSESSMENT / PLAN:   (Z00.00) Encounter for Medicare annual wellness exam  (primary encounter diagnosis)  Comment: Annual Medicare exam patient is doing quite well.  Has some stress but seems to be dealing with it well.  He is not over the like he has made.  Plan: The part-time job is really helping him deal well with stress.    (Z87.891) Personal history of tobacco use  Comment: He continues to smoke and probably has a close to 45-year pack year history  Plan: Prof fee: Shared Decisionmaking for Lung Cancer        Screening, CT Chest Lung Cancer Scrn Low Dose         wo        He had his baseline CT scan of his lungs 2 years ago is now due for his next scan.  We will get this scheduled.    (Z23) Need for Td vaccine  Comment: Due for Td booster  Plan: TD PRSERV FREE >=7 YRS ADS IM        This was given today.    (Z23) Need for shingles vaccine  Comment: He is due for his second shingles vaccine but since he is Medicare we will continue this at his pharmacy.  Plan: ZOSTER VACCINE RECOMBINANT ADJUVANTED IM NJX        I will remind him to get this.      Patient has been advised of split billing requirements and indicates understanding: Yes    COUNSELING:  Reviewed preventive health counseling, as reflected in patient instructions       Regular exercise       Healthy diet/nutrition       Vision screening       Fall risk prevention       Immunizations    Vaccinated for: Td          Estimated body mass index is 19.07  "kg/m  as calculated from the following:    Height as of this encounter: 1.981 m (6' 6\").    Weight as of this encounter: 74.8 kg (165 lb).        He reports that he has been smoking cigarettes. He has a 45.00 pack-year smoking history. He has never used smokeless tobacco.  Tobacco Cessation Action Plan:   Information offered: Patient not interested at this time      Appropriate preventive services were discussed with this patient, including applicable screening as appropriate for cardiovascular disease, diabetes, osteopenia/osteoporosis, and glaucoma.  As appropriate for age/gender, discussed screening for colorectal cancer, prostate cancer, breast cancer, and cervical cancer. Checklist reviewing preventive services available has been given to the patient.    Reviewed patients plan of care and provided an AVS. The Basic Care Plan (routine screening as documented in Health Maintenance) for Gomez meets the Care Plan requirement. This Care Plan has been established and reviewed with the Patient and spouse.    Counseling Resources:  ATP IV Guidelines  Pooled Cohorts Equation Calculator  Breast Cancer Risk Calculator  Breast Cancer: Medication to Reduce Risk  FRAX Risk Assessment  ICSI Preventive Guidelines  Dietary Guidelines for Americans, 2010  Connect's MyPlate  ASA Prophylaxis  Lung CA Screening    Electronically signed by:  Francis Lane M.D.  4/18/2022      Identified Health Risks:  Lung Cancer Screening Shared Decision Making Visit     Gomez Gutierrez is eligible for lung cancer screening on the basis of the information provided in my signed lung cancer screening order.     I have discussed with patient the risks and benefits of screening for lung cancer with low-dose CT.     The risks include:  radiation exposure: one low dose chest CT has as much ionizing radiation as about 15 chest x-rays or 6 months of background radiation living in [place]    false positives: 96% of positive findings/nodules are NOT " cancer, but some might still require additional diagnostic evaluation, including biopsy  over-diagnosis: some slow growing cancers that might never have been clinically significant will be detected and treated unnecessarily     The benefit of early detection of lung cancer is contingent upon adherence to annual screening or more frequent follow up if indicated.     Furthermore, reaping the benefits of screening requires Gomez Gutierrez to be willing and physically able to undergo diagnostic procedures, if indicated. Although no specific guide is available for determining severity of comorbidities, it is reasonable to withhold screening in patients who have greater mortality risk from other diseases.     We did discuss that the only way to prevent lung cancer is to not smoke. Smoking cessation counseling was given, duration < 3 minutes.      I did offer risk estimation using a calculator such as this one:    ShouldIScreen    Risk is 4.2%      He is at risk for lack of exercise and has been provided with information to increase physical activity for the benefit of his well-being.  The patient reports that he drinks more than one alcoholic drink per day and sometimes engages in binge or excessive drinking. He was counseled and given information about possible harmful effects of excessive alcohol intake as well as where to get help for alcohol problems.

## 2022-04-22 ENCOUNTER — TELEPHONE (OUTPATIENT)
Dept: FAMILY MEDICINE | Facility: CLINIC | Age: 62
End: 2022-04-22
Payer: COMMERCIAL

## 2022-04-22 NOTE — TELEPHONE ENCOUNTER
Spoke with the patient.  It was an issue regarding one of his children.  We addressed it and nothing further needed.     Electronically signed by:  Francis Lane M.D.  4/22/2022

## 2022-04-22 NOTE — TELEPHONE ENCOUNTER
Patient calling and wanting to speak with Dr. Lane. Informed he is not in clinic on Fridays. Patient asked that a message be sent. Patient would not give any further explanation other than it is personal. Informed we would send message as requested. Padma Barry LPN

## 2022-04-25 ENCOUNTER — TELEPHONE (OUTPATIENT)
Dept: FAMILY MEDICINE | Facility: CLINIC | Age: 62
End: 2022-04-25
Payer: COMMERCIAL

## 2022-04-25 DIAGNOSIS — M79.672 LEFT FOOT PAIN: Primary | ICD-10-CM

## 2022-04-25 NOTE — TELEPHONE ENCOUNTER
Reason for Call: Request for an order or referral:    Order or referral being requested: xray of left foot.     Date needed: as soon as possible    Has the patient been seen by the PCP for this problem? NO    Additional comments: pt woke up this weekend with severe bruising to his left foot/toe area. Denies any injury. Did have cramping in the middle of night and unsure if this is related.  He is coming in tomorrow for a CT of the chest and wondering if you could order an xray to see what is going on . Also would you like to work him in tomorrow if possible to see. He will be here @1020 for his CT.     Phone number Patient can be reached at:  Home number on file [phone] (home)    Best Time:  anytime    Can we leave a detailed message on this number?  YES    Call taken on 4/25/2022 at 8:23 AM by Tabatha Torres

## 2022-04-26 ENCOUNTER — HOSPITAL ENCOUNTER (OUTPATIENT)
Dept: CT IMAGING | Facility: CLINIC | Age: 62
Discharge: HOME | End: 2022-04-26
Attending: FAMILY MEDICINE
Payer: COMMERCIAL

## 2022-04-26 ENCOUNTER — HOSPITAL ENCOUNTER (OUTPATIENT)
Dept: GENERAL RADIOLOGY | Facility: CLINIC | Age: 62
Discharge: HOME | End: 2022-04-26
Attending: FAMILY MEDICINE
Payer: COMMERCIAL

## 2022-04-26 DIAGNOSIS — M79.672 LEFT FOOT PAIN: ICD-10-CM

## 2022-04-26 DIAGNOSIS — T14.8XXA FOREIGN BODY IN SKIN: Primary | ICD-10-CM

## 2022-04-26 DIAGNOSIS — Z87.891 PERSONAL HISTORY OF TOBACCO USE: ICD-10-CM

## 2022-04-26 PROCEDURE — 73630 X-RAY EXAM OF FOOT: CPT | Mod: LT

## 2022-04-26 PROCEDURE — 71271 CT THORAX LUNG CANCER SCR C-: CPT

## 2022-05-09 ENCOUNTER — TELEPHONE (OUTPATIENT)
Dept: FAMILY MEDICINE | Facility: CLINIC | Age: 62
End: 2022-05-09
Payer: COMMERCIAL

## 2022-05-09 DIAGNOSIS — U07.1 INFECTION DUE TO 2019 NOVEL CORONAVIRUS: Primary | ICD-10-CM

## 2022-05-09 NOTE — TELEPHONE ENCOUNTER
Need same information on Gomez, date diagnosed with COVID, and if >5 days ago, he is too far out.  He isn't on any meds that would interfere with it.      Electronically signed by:  Francis Lane M.D.  5/9/2022

## 2022-05-09 NOTE — TELEPHONE ENCOUNTER
Pt was diagnosed 1 day ago. He is having headaches, sinus pressure, body aches and cold symptoms. He would like rx sent to [facility] in [place]. Jo Rudolph, CMA

## 2022-05-09 NOTE — TELEPHONE ENCOUNTER
Reason for Call:  Other call back and prescription    Detailed comments: Patient's wife called wanting to know if Dr. Lane could prescribe the antiviral for COVID for him because the whole household has COVID. Their daughter was able to get in [place] but they were told to contact their primary physician. If willing to prescribe it then please send it to the [facility] in [place]    Phone Number Patient can be reached at: Home number on file [phone] (home)    Best Time: any    Can we leave a detailed message on this number? YES    Call taken on 5/9/2022 at 10:20 AM by Shirin De Los Santos

## 2022-06-07 ENCOUNTER — TELEPHONE (OUTPATIENT)
Dept: FAMILY MEDICINE | Facility: CLINIC | Age: 62
End: 2022-06-07
Payer: COMMERCIAL

## 2022-06-07 DIAGNOSIS — I48.91 NEW ONSET ATRIAL FIBRILLATION (H): Primary | ICD-10-CM

## 2022-06-07 NOTE — TELEPHONE ENCOUNTER
Patient seen at [facility] in [place], presented with chest pain lasting over 2 days, was dx with A-fib.  Discharge instructions state patient should be seen by PCP within 2 days, follow up for A-fib dx, to have Echo done as well.    Patient wife (Jana) calling to schedule appointment.  Please call her cell [phone]       Dorita Rivera XRO/

## 2022-06-08 ENCOUNTER — TELEPHONE (OUTPATIENT)
Dept: FAMILY MEDICINE | Facility: CLINIC | Age: 62
End: 2022-06-08

## 2022-06-08 NOTE — TELEPHONE ENCOUNTER
Ok, will place referrals for [place].    Electronically signed by:  Francis Lane M.D.  6/8/2022

## 2022-06-08 NOTE — TELEPHONE ENCOUNTER
Reason for Call:  Other call back    Detailed comments: pt calling stating he went to ER and was told to have a cardiology appointment and echo within 2 to 3 days. Please call pt to disuss and get worked in if possible. Thank you    Phone Number Patient can be reached at: Home number on file [phone] (home)    Best Time: any    Can we leave a detailed message on this number? YES    Call taken on 6/8/2022 at 9:26 AM by Jessika Crawford

## 2022-06-08 NOTE — TELEPHONE ENCOUNTER
I have nothing available prior to me leaving on vacation.  We can get him set up with cardiology so can bypass seeing me altogether.  We can also get him scheduled for an echo while he is waiting to see cardiology. I placed orders for both, so please help get them scheduled for him.      Electronically signed by:  Francis Lane M.D.  6/8/2022

## 2022-06-08 NOTE — TELEPHONE ENCOUNTER
Wife called back and requested referrals for:      Cardiologist - kathi in [facility]  Fax [phone]  Requested that orders be entered as Urgent    [facility] in [place] Fax [phone]

## 2022-06-09 NOTE — TELEPHONE ENCOUNTER
Patient's wife was sent to writer. Writer reached out to TC who was on the phone. Patient's wife became frustrated and hung up on the writer.     They are needing a dx with the echo order faxed.     Chan Muñoz, GONZALON, RN, PHN  [facility] RN for [place]  June 9, 2022

## 2022-06-09 NOTE — TELEPHONE ENCOUNTER
[facility] said he needs an EKG as well per his ED visit.  Will you sign for this for him and I will fax it.

## 2022-06-09 NOTE — TELEPHONE ENCOUNTER
Patient calling to following up.     Patient was wondering if the Echocardiogram was faxed to ?   [phone]    Please contact the patient once this has been completed.     Chan Muñoz, GONZALON, RN, PHN  [facility] RN for [place]  June 9, 2022

## 2022-06-21 ENCOUNTER — TRANSFERRED RECORDS (OUTPATIENT)
Dept: FAMILY MEDICINE | Facility: CLINIC | Age: 62
End: 2022-06-21

## 2022-07-13 ENCOUNTER — TRANSFERRED RECORDS (OUTPATIENT)
Dept: FAMILY MEDICINE | Facility: CLINIC | Age: 62
End: 2022-07-13

## 2022-09-15 ENCOUNTER — TELEPHONE (OUTPATIENT)
Dept: FAMILY MEDICINE | Facility: CLINIC | Age: 62
End: 2022-09-15

## 2022-09-15 NOTE — TELEPHONE ENCOUNTER
Reason for Call:  Other appointment and call back    Detailed comments: Jana called because Gomez has been having back pain for 1 month and is scheduled for Dr. Sarabia first opening which is 10/24/22. They were wondering if there would be any possibility of getting in sooner or being put on a wait list. Please call and discuss    Phone Number Patient can be reached at: Cell number on file:    Telephone Information:   Mobile [phone]       Best Time: Anytime    Can we leave a detailed message on this number? YES    Call taken on 9/15/2022 at 9:03 AM by Alysa Morocho

## 2022-09-21 NOTE — TELEPHONE ENCOUNTER
You can use an afternoon virtual appt for him, nothing before 8 am.  You can use both the 2:40 and 3 pm slots on 10/4/2022.  That is about the soonest I could squeeze him in.    Electronically signed by:  Francis Lane M.D.  9/21/2022

## 2022-09-27 NOTE — TELEPHONE ENCOUNTER
Patient eneded up needing it to be after his appt that is scheduled due to helping a friend move after her  passed away. I helped him reschedule   Deborah Nicholson MA 9/27/2022

## 2022-11-01 ENCOUNTER — OFFICE VISIT (OUTPATIENT)
Dept: FAMILY MEDICINE | Facility: CLINIC | Age: 62
End: 2022-11-01
Payer: COMMERCIAL

## 2022-11-01 VITALS
WEIGHT: 162 LBS | RESPIRATION RATE: 20 BRPM | BODY MASS INDEX: 18.72 KG/M2 | SYSTOLIC BLOOD PRESSURE: 122 MMHG | TEMPERATURE: 99 F | DIASTOLIC BLOOD PRESSURE: 68 MMHG | OXYGEN SATURATION: 100 %

## 2022-11-01 DIAGNOSIS — M62.830 BACK MUSCLE SPASM: ICD-10-CM

## 2022-11-01 DIAGNOSIS — Z98.890 S/P ARTHROSCOPY OF LEFT SHOULDER: ICD-10-CM

## 2022-11-01 DIAGNOSIS — Z98.890 S/P LEFT ROTATOR CUFF REPAIR: ICD-10-CM

## 2022-11-01 DIAGNOSIS — F17.200 TOBACCO USE DISORDER: ICD-10-CM

## 2022-11-01 DIAGNOSIS — G47.09 OTHER INSOMNIA: ICD-10-CM

## 2022-11-01 PROBLEM — I48.91 ATRIAL FIBRILLATION (H): Status: RESOLVED | Noted: 2022-06-27 | Resolved: 2022-11-01

## 2022-11-01 PROBLEM — I48.91 ATRIAL FIBRILLATION (H): Status: ACTIVE | Noted: 2022-06-27

## 2022-11-01 PROCEDURE — 99214 OFFICE O/P EST MOD 30 MIN: CPT | Performed by: FAMILY MEDICINE

## 2022-11-01 RX ORDER — TEMAZEPAM 15 MG/1
15 CAPSULE ORAL
Qty: 30 CAPSULE | Refills: 5 | Status: SHIPPED | OUTPATIENT
Start: 2022-11-01 | End: 2023-01-20

## 2022-11-01 RX ORDER — KETOROLAC TROMETHAMINE 10 MG/1
10 TABLET, FILM COATED ORAL EVERY 6 HOURS PRN
Qty: 30 TABLET | Refills: 0 | Status: SHIPPED | OUTPATIENT
Start: 2022-11-01 | End: 2024-06-13

## 2022-11-01 RX ORDER — BUPROPION HYDROCHLORIDE 150 MG/1
150 TABLET ORAL EVERY MORNING
Qty: 60 TABLET | Refills: 2 | Status: SHIPPED | OUTPATIENT
Start: 2022-11-01 | End: 2023-01-20

## 2022-11-01 ASSESSMENT — PAIN SCALES - GENERAL: PAINLEVEL: MODERATE PAIN (5)

## 2022-11-01 NOTE — PROGRESS NOTES
Assessment & Plan     (F17.200) Tobacco use disorder  Comment: patient is really wanting to quit smoking.  He has full custody of his 7-year-old grandson right now he keeps on bugging him to quit smoking.  Plan: buPROPion (WELLBUTRIN XL) 150 MG 24 hr tablet        He has had issues using Chantix in the past so organ to try bupropion to see if we can decrease his cravings for nicotine.  I told him that he really does need to set a quit date at least 3 to 4 weeks after starting the medication and put himself into situations where he is going to not be able to smoke as much.  Currently does not smoke in his vehicle or his house and goes outside to his shed to smoke.  He will try to make it even more difficult for him to smoke so that he can naturally cut down before he sets his quit date.  He will message me on MyChart to let me know how he is tolerating the medication and see how it is working.    (M62.830) Back muscle spasm  Comment: Recent flare of his back muscle spasms.  This visit today was initially for that but he used some prednisone that he had at home and ketorolac and that seemed to improve his symptoms.  Plan: We talked about how to continue using the medication he has on hand fourth periodic flares.  He was using 10 mg of Toradol along with 10 mg of prednisone and was getting good results.  He can continue this or he can just increase the dose of the prednisone to 20 to 30 mg for 3 to 5 days to try to settle things down when he gets a flare.  He does have to be careful about protecting his stomach and taking either of those medications with food so that he does not get any gastritis or peptic ulcer disease.    (Z98.890) S/P arthroscopy of left shoulder  (Z98.890) S/P left rotator cuff repair  Comment: His shoulder is doing well he wanted a refill of his Toradol tablets which worked well for his back and he has flares up to.  Plan: ketorolac (TORADOL) 10 MG tablet        Refill of his Toradol was sent.   Shoulder has been stable so no concerns there.    (G47.09) Other insomnia  Comment: He is still using temazepam periodically and has not had a refill for over a year.  We will go ahead and send him a refill of this to have on hand.  Plan: temazepam (RESTORIL) 15 MG capsule        Refill sent to the pharmacy for him      30 minutes spent on the date of the encounter doing chart review, history and exam, documentation and further activities per the note        No follow-ups on file.    Francis Lane MD, MD  [facility]    Petrona Dyer is a 62 year old, presenting for the following health issues:  Back Pain      HPI   He had a flare of his back pain couple months ago and may be appointment but in the meantime used Toradol and prednisone that he had at home.  He said he took 10 mg of Toradol orally along with 10 mg of prednisone for 3 or 4 days and it seemed to work nicely.  He has done this previously without any problems.  I talked to him about his dosing of prednisone which is quite low for a man of his age and size.  If he is getting good results on the low-dose like that that is reasonable to continue to use that but there are some dangers of him using the Toradol and prednisone together since they are both anti-inflammatory and can affect the GI tract.  He does not overuse medications at all so I am not concerned about him overusing medication and just 1 to make sure that he is not increasing his risk risk for peptic ulcer disease.    He is interested in quitting smoking.  He and his wife just got permanent custody of his 7-year-old grandson.  His son who is the father of this child is in snf and probably will be for quite some time he is facing up to 24 years in [employer] for criminal charges and primarily for his breaking his parole he has 5 parole violations which is most likely going to cause more issues with time in snf than some of the other charges that he is being sentenced  with.  The 7-year-old grandson really wants him to quit smoking and Gomez does also.  He has been smoking for 40+ years and does have some early lung disease.  His mother  from complications of COPD he does not want to go down that same route that she did.  He has used Chantix in the past but has had too many side effects from that so wants to try something different if possible.  We did discuss Wellbutrin which will help minimize his cravings for nicotine.  We discussed how to use it, started once daily and set a quit date for 3 to 4 weeks after starting medication.  He will also put himself in situations where he is more apt to smoke less.  He will not smoke in his car, house, and limit the areas outside the he came smoke cigarettes.  He is up for trying this.    He has been sleeping okay he does have some temazepam at home but that is due for refill so we will send that in for him also.      Review of Systems   Constitutional, HEENT, cardiovascular, pulmonary, gi and gu systems are negative, except as otherwise noted.      Objective    /68   Temp 99  F (37.2  C) (Temporal)   Resp 20   Wt 73.5 kg (162 lb)   SpO2 100%   BMI 18.72 kg/m    Body mass index is 18.72 kg/m .  Physical Exam   GENERAL: healthy, alert and no distress  RESP: lungs clear to auscultation - no rales, rhonchi or wheezes  CV: regular rates and rhythm, normal S1 S2, no S3 or S4 and no murmur, click or rub  MS: I did not do a back or musculoskeletal exam today since he was not complaining of any current symptoms.

## 2023-01-20 ENCOUNTER — OFFICE VISIT (OUTPATIENT)
Dept: INTERNAL MEDICINE | Facility: CLINIC | Age: 63
End: 2023-01-20
Payer: COMMERCIAL

## 2023-01-20 VITALS
HEIGHT: 77 IN | SYSTOLIC BLOOD PRESSURE: 118 MMHG | BODY MASS INDEX: 19.23 KG/M2 | RESPIRATION RATE: 20 BRPM | DIASTOLIC BLOOD PRESSURE: 74 MMHG | WEIGHT: 162.9 LBS | HEART RATE: 72 BPM | OXYGEN SATURATION: 99 % | TEMPERATURE: 98.1 F

## 2023-01-20 DIAGNOSIS — H26.9 CATARACT OF LEFT EYE, UNSPECIFIED CATARACT TYPE: ICD-10-CM

## 2023-01-20 DIAGNOSIS — Z01.818 PREOP GENERAL PHYSICAL EXAM: Primary | ICD-10-CM

## 2023-01-20 PROCEDURE — 99214 OFFICE O/P EST MOD 30 MIN: CPT | Performed by: INTERNAL MEDICINE

## 2023-01-20 ASSESSMENT — PAIN SCALES - GENERAL: PAINLEVEL: NO PAIN (0)

## 2023-01-20 NOTE — PROGRESS NOTES
[address] [address]  Phone: [phone]  Primary Provider: Francis Lane  Pre-op Performing Provider: PEARL BYNUM    PREOPERATIVE EVALUATION:  Today's date: 1/20/2023    Gomez Gutierrez is a 62 year old male who presents for a preoperative evaluation.    Surgical Information:  Surgery/Procedure: Left cataract removal, corrective lens, tube insertion  Surgery Location: [facility]  Surgeon: Dr. Lopez  Surgery Date: 1/23/2023  Time of Surgery: to be determined  Where patient plans to recover: At home with family  Fax number for surgical facility: [phone]    Type of Anesthesia Anticipated: to be determined. Local and MAC.      Assessment & Plan     The proposed surgical procedure is considered LOW risk.    Problem List Items Addressed This Visit    None  Visit Diagnoses     Preop general physical exam    -  Primary    Cataract of left eye, unspecified cataract type              Patient is here for a preop, he needs cataract surgery on his left eye.  He is having also a tube placed in for some increased pressures.  Is on prednisone drops, Toradol drops, latanoprost drops.  Patient is cleared for surgery he is doing well without any other medications.               RECOMMENDATION:  APPROVAL GIVEN to proceed with proposed procedure, without further diagnostic evaluation.            Subjective     HPI related to upcoming procedure:  Left cataract removal, corrective lens, tube insertion  Plan for local and MAC    Preop Questions 1/20/2023   1. Have you ever had a heart attack or stroke? No   2. Have you ever had surgery on your heart or blood vessels, such as a stent placement, a coronary artery bypass, or surgery on an artery in your head, neck, heart, or legs? No   3. Do you have chest pain with activity? No   4. Do you have a history of  heart failure? No   5. Do you currently have a cold, bronchitis or symptoms of other infection?  No   6. Do you have a cough, shortness of breath, or wheezing? No   7. Do you or anyone in your family have previous history of blood clots? No   8. Do you or does anyone in your family have a serious bleeding problem such as prolonged bleeding following surgeries or cuts? No   9. Have you ever had problems with anemia or been told to take iron pills? No   10. Have you had any abnormal blood loss such as black, tarry or bloody stools? No   11. Have you ever had a blood transfusion? No   12. Are you willing to have a blood transfusion if it is medically needed before, during, or after your surgery? Yes   13. Have you or any of your relatives ever had problems with anesthesia? No   14. Do you have sleep apnea, excessive snoring or daytime drowsiness? YES - sleep apnea   14a. Do you have a CPAP machine? No   15. Do you have any artifical heart valves or other implanted medical devices like a pacemaker, defibrillator, or continuous glucose monitor? No   16. Do you have artificial joints? No   17. Are you allergic to latex? No       Health Care Directive:  Patient does not have a Health Care Directive or Living Will: Discussed advance care planning with patient; however, patient declined at this time.    Preoperative Review of :   reviewed - no record of controlled substances prescribed.      Status of Chronic Conditions:  See problem list for active medical problems.  Problems all longstanding and stable, except as noted/documented.  See ROS for pertinent symptoms related to these conditions.      Review of Systems  CONSTITUTIONAL: NEGATIVE for fever, chills, change in weight  ENT/MOUTH: NEGATIVE for ear, mouth and throat problems  RESP: NEGATIVE for significant cough or SOB  CV: NEGATIVE for chest pain, palpitations or peripheral edema    Patient Active Problem List    Diagnosis Date Noted     Other insomnia 11/01/2022     Priority: Medium     Bicipital tendonitis of left shoulder 08/06/2021     Priority: Medium      Added automatically from request for surgery [id number]       Traumatic complete tear of left rotator cuff 10/27/2020     Priority: Medium     Acute medial meniscus tear of right knee, subsequent encounter 01/28/2020     Priority: Medium     Discoloration of skin of hand (bilateral) hands turn white then red and purple when cold with burning sensation 01/17/2019     Priority: Medium     Back muscle spasm 01/17/2019     Priority: Medium     Personal history of nicotine dependence  12/05/2018     Priority: Medium     Glaucoma suspect, bilateral 12/05/2018     Priority: Medium     Chronic midline low back pain with sciatica, sciatica laterality unspecified 05/16/2017     Priority: Medium     Lumbar radiculopathy 08/17/2016     Priority: Medium     Foot drop, left 08/30/2012     Priority: Medium     CARDIOVASCULAR SCREENING; LDL GOAL LESS THAN 160 10/31/2010     Priority: Medium     Neuropathic pain syndrome (non-herpetic) 04/08/2009     Priority: Medium     Obstructive sleep apnea 03/03/2007     Priority: Medium     Problem list name updated by automated process. Provider to review       iamLUMBAR DISC DISPLACEMENT 11/20/2006     Priority: Medium     iamPOSTSURGICAL STATES NEC 11/20/2006     Priority: Medium     Other male erectile dysfunction 08/04/2006     Priority: Medium     Tobacco use disorder 05/31/2002     Priority: Medium     Psychosexual dysfunction with inhibited sexual excitement 05/31/2002     Priority: Medium      Past Medical History:   Diagnosis Date     Arthritis      Atrial fibrillation (H) 6/27/2022     Lumbago      Major depression in complete remission (H) 8/30/2012     Severe Depression [296.33] 6/18/2009     Sleep apnea      Tobacco use disorder      Past Surgical History:   Procedure Laterality Date     ARTHROSCOPY KNEE WITH MEDIAL MENISCECTOMY Right 2/24/2020    Procedure: ARTHROSCOPY, KNEE, WITH MEDIAL MENISCECTOMY right;  Surgeon: Eliot Bañuelos MD;  Location: [facility] OR     ARTHROSCOPY  SHOULDER Left 8/11/2021    Procedure: Arthroscopy shoulder left ;  Surgeon: Fercho Guerin DO;  Location: [facility] OR     ARTHROTOMY SHOULDER, ROTATOR CUFF REPAIR, COMBINED Left 8/11/2021    Procedure: ARTHROTOMY, SHOULDER, WITH ROTATOR CUFF REPAIR;  Surgeon: Fercho Guerin DO;  Location: [facility] OR     BACK SURGERY  11/2015    cleaned out bone spurs     COLONOSCOPY N/A 2/18/2020    Procedure: Colonoscopy;  Surgeon: Juan Beltrán DO;  Location: [facility] GI     HC REMOVE TONSILS/ADENOIDS,<13 Y/O      T & A <11y.o.     LAMINECT/DISCECTOMY, LUMBAR  10/19/06    L3-4     LAMINECT/DISCECTOMY, LUMBAR  11/10    left sided L4-5     REMOVAL OF SPERM DUCT(S)      Vasectomy     ROTATOR CUFF REPAIR RT/LT  3/12    right sided     ZZC ECHO HEART XTHORACIC,COMPLETE, W/O DOPPLER  02/04/07    normal cardiac stress echo test     ZZC ESTEBAN W/O FACETEC FORAMOT/DSKC 1/2 VRT SEG, LUMBAR  1/02    L4-5     ZZC REPAIR LUMBAR HERNIA  1986    L5-4 L5-S1     ZZHC COLONOSCOPY W BIOPSY  11/05/09     Current Outpatient Medications   Medication Sig Dispense Refill     latanoprost (XALATAN) 0.005 % ophthalmic solution INSTILL 1 DROP INTO EACH EYE EVERY DAY AT BEDTIME       buPROPion (WELLBUTRIN XL) 150 MG 24 hr tablet Take 1 tablet (150 mg) by mouth every morning (Patient not taking: Reported on 1/20/2023) 60 tablet 2     ketorolac (TORADOL) 10 MG tablet Take 1 tablet (10 mg) by mouth every 6 hours as needed for moderate pain (Patient not taking: Reported on 1/20/2023) 30 tablet 0     nirmatrelvir and ritonavir (PAXLOVID) therapy pack Take 3 tablets by mouth 2 times daily (Patient not taking: Reported on 11/1/2022) 30 each 0     temazepam (RESTORIL) 15 MG capsule Take 1 capsule (15 mg) by mouth nightly as needed for sleep (Patient not taking: Reported on 1/20/2023) 30 capsule 5       Allergies   Allergen Reactions     No Known Drug Allergies         Social History     Tobacco Use     Smoking status: Every Day     Packs/day: 1.00     Years: 45.00     Pack  "years: 45.00     Types: Cigarettes     Smokeless tobacco: Never     Tobacco comments:      wants to quit   Substance Use Topics     Alcohol use: Yes     Alcohol/week: 6.0 standard drinks     Types: 6 Cans of beer per week     Comment: 4-6 per day       History   Drug Use     Types: Marijuana         Objective     /74 (BP Location: Right arm, Patient Position: Chair)   Pulse 72   Temp 98.1  F (36.7  C) (Temporal)   Resp 20   Ht 1.956 m (6' 5\")   Wt 73.9 kg (162 lb 14.4 oz)   SpO2 99%   BMI 19.32 kg/m      Physical Exam  GENERAL APPEARANCE: healthy, alert and no distress  HENT: ear canals and TM's normal and nose and mouth without ulcers or lesions  RESP: lungs clear to auscultation - no rales, rhonchi or wheezes and prolonged expiratory phase  CV: regular rate and rhythm, normal S1 S2, no S3 or S4 and no murmur, click or rub   ABDOMEN: soft, nontender, no HSM or masses and bowel sounds normal  NEURO: Normal strength and tone, sensory exam grossly normal, mentation intact and speech normal    No results for input(s): HGB, PLT, INR, NA, POTASSIUM, CR, A1C in the last 58892 hours.     Diagnostics:  No labs were ordered during this visit.   No EKG required for low risk surgery (cataract, skin procedure, breast biopsy, etc).    Revised Cardiac Risk Index (RCRI):  The patient has the following serious cardiovascular risks for perioperative complications:   - No serious cardiac risks = 0 points     RCRI Interpretation: 1 point: Class II (low risk - 0.9% complication rate)           Signed Electronically by: Ifeanyi Hernandez MD  Copy of this evaluation report is provided to requesting physician.      "

## 2023-01-20 NOTE — PATIENT INSTRUCTIONS
For informational purposes only. Not to replace the advice of your health care provider. Copyright   2003,  [facility]. All rights reserved. Clinically reviewed by Keely Mace MD. [place] [id number] - REV .  Preparing for Your Surgery  Getting started  A nurse will call you to review your health history and instructions. They will give you an arrival time based on your scheduled surgery time. Please be ready to share:    Your doctor's clinic name and phone number    Your medical, surgical, and anesthesia history    A list of allergies and sensitivities    A list of medicines, including herbal treatments and over-the-counter drugs    Whether the patient has a legal guardian (ask how to send us the papers in advance)  Please tell us if you're pregnant--or if there's any chance you might be pregnant. Some surgeries may injure a fetus (unborn baby), so they require a pregnancy test. Surgeries that are safe for a fetus don't always need a test, and you can choose whether to have one.   If you have a child who's having surgery, please ask for a copy of Preparing for Your Child's Surgery.    Preparing for surgery    Within 10 to 30 days of surgery: Have a pre-op exam (sometimes called an H&P, or History and Physical). This can be done at a clinic or pre-operative center.  ? If you're having a , you may not need this exam. Talk to your care team.    At your pre-op exam, talk to your care team about all medicines you take. If you need to stop any medicines before surgery, ask when to start taking them again.  ? We do this for your safety. Many medicines can make you bleed too much during surgery. Some change how well surgery (anesthesia) drugs work.    Call your insurance company to let them know you're having surgery. (If you don't have insurance, call [phone].)    Call your clinic if there's any change in your health. This includes signs of a cold or flu (sore throat, runny nose,  cough, rash, fever). It also includes a scrape or scratch near the surgery site.    If you have questions on the day of surgery, call your hospital or surgery center.  Eating and drinking guidelines  For your safety: Unless your surgeon tells you otherwise, follow the guidelines below.    Eat and drink as usual until 8 hours before you arrive for surgery. After that, no food or milk.    Drink clear liquids until 2 hours before you arrive. These are liquids you can see through, like water, Gatorade, and Propel Water. They also include plain black coffee and tea (no cream or milk), candy, and breath mints. You can spit out gum when you arrive.    If you drink alcohol: Stop drinking it the night before surgery.    If your care team tells you to take medicine on the morning of surgery, it's okay to take it with a sip of water.  Preventing infection    Shower or bathe the night before and morning of your surgery. Follow the instructions your clinic gave you. (If no instructions, use regular soap.)    Don't shave or clip hair near your surgery site. We'll remove the hair if needed.    Don't smoke or vape the morning of surgery. You may chew nicotine gum up to 2 hours before surgery. A nicotine patch is okay.  ? Note: Some surgeries require you to completely quit smoking and nicotine. Check with your surgeon.    Your care team will make every effort to keep you safe from infection. We will:  ? Clean our hands often with soap and water (or an alcohol-based hand rub).  ? Clean the skin at your surgery site with a special soap that kills germs.  ? Give you a special gown to keep you warm. (Cold raises the risk of infection.)  ? Wear special hair covers, masks, gowns and gloves during surgery.  ? Give antibiotic medicine, if prescribed. Not all surgeries need antibiotics.  What to bring on the day of surgery    Photo ID and insurance card    Copy of your health care directive, if you have one    Glasses and hearing aids (bring  cases)  ? You can't wear contacts during surgery    Inhaler and eye drops, if you use them (tell us about these when you arrive)    CPAP machine or breathing device, if you use them    A few personal items, if spending the night    If you have . . .  ? A pacemaker, ICD (cardiac defibrillator) or other implant: Bring the ID card.  ? An implanted stimulator: Bring the remote control.  ? A legal guardian: Bring a copy of the certified (court-stamped) guardianship papers.  Please remove any jewelry, including body piercings. Leave jewelry and other valuables at home.  If you're going home the day of surgery    You must have a responsible adult drive you home. They should stay with you overnight as well.    If you don't have someone to stay with you, and you aren't safe to go home alone, we may keep you overnight. Insurance often won't pay for this.  After surgery  If it's hard to control your pain or you need more pain medicine, please call your surgeon's office.  Questions?   If you have any questions for your care team, list them here: _________________________________________________________________________________________________________________________________________________________________________ ____________________________________ ____________________________________ ____________________________________

## 2023-02-28 NOTE — TELEPHONE ENCOUNTER
Reason for Call:  Other prescription    Detailed comments: Patient states Prednisone was forgot to be sent over yesterday. Please send script to [facility] in [place]    Phone Number Patient can be reached at: Home number on file [phone] (home)    Best Time: any     Can we leave a detailed message on this number? YES    Call taken on 1/17/2020 at 11:52 AM by Hannah Rosas     No

## 2023-03-27 ENCOUNTER — TELEPHONE (OUTPATIENT)
Dept: FAMILY MEDICINE | Facility: CLINIC | Age: 63
End: 2023-03-27
Payer: COMMERCIAL

## 2023-03-27 NOTE — TELEPHONE ENCOUNTER
Forms/Letter Request    Type of form/letter: Attending physician statement    Have you been seen for this request: Yes     Do we have the form/letter: Yes:     When is form/letter needed by: asap    How would you like the form/letter returned: Fax    Patient Notified form requests are processed in 3-5 business days:No    Could we send this information to you in [facility] or would you prefer to receive a phone call?:   Give to Mariaelena to fax.

## 2023-03-30 NOTE — TELEPHONE ENCOUNTER
Forms signed and in Mariaelena's basket.    Electronically signed by:  Francis Lane M.D.  7/24/2012

## 2023-04-21 ENCOUNTER — TELEPHONE (OUTPATIENT)
Dept: FAMILY MEDICINE | Facility: CLINIC | Age: 63
End: 2023-04-21
Payer: COMMERCIAL

## 2023-04-21 NOTE — TELEPHONE ENCOUNTER
General Call    Contacts       Type Contact Phone/Fax    04/21/2023 11:45 AM CDT Phone (Incoming) Gomez Gutierrez (Self) [phone] (H)        Reason for Call: I scheduled a wellness check for pt's wife on 8/28 @ 3:20pm, but pt needs wellness check around same time as they live an hour away and will only see Dr. Lane. Is there a way doctor Lane can fit pt in on same day? Can you call pt and let them know if this can be done? Thank you!      Could we send this information to you in [employer] or would you prefer to receive a phone call?:   Patient would prefer a phone call   Okay to leave a detailed message?: Yes at Home number on file [phone] (home)

## 2023-04-26 ENCOUNTER — TELEPHONE (OUTPATIENT)
Dept: FAMILY MEDICINE | Facility: CLINIC | Age: 63
End: 2023-04-26
Payer: COMMERCIAL

## 2023-04-26 DIAGNOSIS — Z87.891 PERSONAL HISTORY OF TOBACCO USE: Primary | ICD-10-CM

## 2023-04-26 PROCEDURE — G0296 VISIT TO DETERM LDCT ELIG: HCPCS | Performed by: FAMILY MEDICINE

## 2023-04-26 NOTE — TELEPHONE ENCOUNTER
Order placed for CT scan     Electronically signed by:  Francis Lane M.D.  4/26/2023    Lung Cancer Screening Shared Decision Making Visit     Gomez Gutierrez, a 62 year old male, is eligible for lung cancer screening    History   Smoking Status     Every Day     Packs/day: 1.00     Years: 45.00     Types: Cigarettes   Smokeless Tobacco     Never       I have discussed with patient the risks and benefits of screening for lung cancer with low-dose CT.     The risks include:    radiation exposure: one low dose chest CT has as much ionizing radiation as about 15 chest x-rays, or 6 months of background radiation living in [place]      false positives: most findings/nodules are NOT cancer, but some might still require additional diagnostic evaluation, including biopsy    over-diagnosis: some slow growing cancers that might never have been clinically significant will be detected and treated unnecessarily     The benefit of early detection of lung cancer is contingent upon adherence to annual screening or more frequent follow up if indicated.     Furthermore, to benefit from screening, Gomez must be willing and able to undergo diagnostic procedures, if indicated. Although no specific guide is available for determining severity of comorbidities, it is reasonable to withhold screening in patients who have greater mortality risk from other diseases.     We did discuss that the best way to prevent lung cancer is to not smoke.    Some patients may value a numeric estimation of lung cancer risk when evaluating if lung cancer screening is right for them, here is one calculator:    ShouldIScreen

## 2023-04-26 NOTE — TELEPHONE ENCOUNTER
Order/Referral Request    Who is requesting: patient    Orders being requested: lung scan    Reason service is needed/diagnosis: due    When are orders needed by: asap    Has this been discussed with Provider: Yes    Does patient have a preference on a Group/Provider/Facility? [facility]    Does patient have an appointment scheduled?: No    Where to send orders: N/A    Could we send this information to you in [facility] or would you prefer to receive a phone call?:   Patient would prefer a phone call   Okay to leave a detailed message?: Yes at Cell number on file:    Telephone Information:   Mobile [phone]

## 2023-04-26 NOTE — PATIENT INSTRUCTIONS
Lung Cancer Screening   Frequently Asked Questions  If you are at high-risk for lung cancer, getting screened with low-dose computed tomography (LDCT) every year can help save your life. This handout offers answers to some of the most common questions about lung cancer screening. If you have other questions, please call [phone] ([phone]).     What is it?  Lung cancer screening uses special X-ray technology to create an image of your lung tissue. The exam is quick and easy and takes less than 10 seconds. We don t give you any medicine or use any needles. You can eat before and after the exam. You don t need to change your clothes as long as the clothing on your chest doesn t contain metal. But, you do need to be able to hold your breath for at least 6 seconds during the exam.    What is the goal of lung cancer screening?  The goal of lung cancer screening is to save lives. Many times, lung cancer is not found until a person starts having physical symptoms. Lung cancer screening can help detect lung cancer in the earliest stages when it may be easier to treat.    Who should be screened for lung cancer?  We suggest lung cancer screening for anyone who is at high-risk for lung cancer. You are in the high-risk group if you:      are between the ages of 55 and 79, and    have smoked at least 1 pack of cigarettes a day for 20 or more years, and    still smoke or have quit within the past 15 years.    However, if you have a new cough or shortness of breath, you should talk to your doctor before being screened.    Why does it matter if I have symptoms?  Certain symptoms can be a sign that you have a condition in your lungs that should be checked and treated by your doctor. These symptoms include fever, chest pain, a new or changing cough, shortness of breath that you have never felt before, coughing up blood or unexplained weight loss. Having any of these symptoms can greatly affect the results of lung  cancer screening.       Should all smokers get an LDCT lung cancer screening exam?  It depends. Lung cancer screening is for a very specific group of men and women who have a history of heavy smoking over a long period of time (see  Who should be screened for lung cancer  above).  I am in the high-risk group, but have been diagnosed with cancer in the past. Is LDCT lung cancer screening right for me?  In some cases, you should not have LDCT lung screening, such as when your doctor is already following your cancer with CT scan studies. Your doctor will help you decide if LDCT lung screening is right for you.  Do I need to have a screening exam every year?  Yes. If you are in the high-risk group described earlier, you should get an LDCT lung cancer screening exam every year until you are 79, or are no longer willing or able to undergo screening and possible procedures to diagnose and treat lung cancer.  How effective is LDCT at preventing death from lung cancer?  Studies have shown that LDCT lung cancer screening can lower the risk of death from lung cancer by 20 percent in people who are at high-risk.  What are the risks?  There are some risks and limitations of LDCT lung cancer screening. We want to make sure you understand the risks and benefits, so please let us know if you have any questions. Your doctor may want to talk with you more about these risks.    Radiation exposure: As with any exam that uses radiation, there is a very small increased risk of cancer. The amount of radiation in LDCT is small--about the same amount a person would get from a mammogram. Your doctor orders the exam when he or she feels the potential benefits outweigh the risks.    False negatives: No test is perfect, including LDCT. It is possible that you may have a medical condition, including lung cancer, that is not found during your exam. This is called a false negative result.    False positives and more testing: LDCT very often finds  something in the lung that could be cancer, but in fact is not. This is called a false positive result. False positive tests often cause anxiety. To make sure these findings are not cancer, you may need to have more tests. These tests will be done only if you give us permission. Sometimes patients need a treatment that can have side effects, such as a biopsy. For more information on false positives, see  What can I expect from the results?     Findings not related to lung cancer: Your LDCT exam also takes pictures of areas of your body next to your lungs. In a very small number of cases, the CT scan will show an abnormal finding in one of these areas, such as your kidneys, adrenal glands, liver or thyroid. This finding may not be serious, but you may need more tests. Your doctor can help you decide what other tests you may need, if any.  What can I expect from the results?  About 1 out of 4 LDCT exams will find something that may need more tests. Most of the time, these findings are lung nodules. Lung nodules are very small collections of tissue in the lung. These nodules are very common, and the vast majority--more than 97 percent--are not cancer (benign). Most are normal lymph nodes or small areas of scarring from past infections.  But, if a small lung nodule is found to be cancer, the cancer can be cured more than 90 percent of the time. To know if the nodule is cancer, we may need to get more images before your next yearly screening exam. If the nodule has suspicious features (for example, it is large, has an odd shape or grows over time), we will refer you to a specialist for further testing.  Will my doctor also get the results?  Yes. Your doctor will get a copy of your results.  Is it okay to keep smoking now that there s a cancer screening exam?  No. Tobacco is one of the strongest cancer-causing agents. It causes not only lung cancer, but other cancers and cardiovascular (heart) diseases as well. The damage  caused by smoking builds over time. This means that the longer you smoke, the higher your risk of disease. While it is never too late to quit, the sooner you quit, the better.  Where can I find help to quit smoking?  The best way to prevent lung cancer is to stop smoking. If you have already quit smoking, congratulations and keep it up! For help on quitting smoking, please call [facility] at 6-336-QUITNOW ([phone]) or the American [employer] Society at 1-945.678.6035 to find local resources near you.  One-on-one health coaching:  If you d prefer to work individually with a health care provider on tobacco cessation, we offer:      Medication Therapy Management:  Our specially trained pharmacists work closely with you and your doctor to help you quit smoking.  Call [phone] or [phone] (toll free).

## 2023-05-18 ENCOUNTER — HOSPITAL ENCOUNTER (OUTPATIENT)
Dept: CT IMAGING | Facility: CLINIC | Age: 63
Discharge: HOME | End: 2023-05-18
Attending: FAMILY MEDICINE | Admitting: FAMILY MEDICINE
Payer: COMMERCIAL

## 2023-05-18 DIAGNOSIS — Z87.891 PERSONAL HISTORY OF TOBACCO USE: ICD-10-CM

## 2023-05-18 PROCEDURE — 71271 CT THORAX LUNG CANCER SCR C-: CPT

## 2023-05-21 ENCOUNTER — HEALTH MAINTENANCE LETTER (OUTPATIENT)
Age: 63
End: 2023-05-21

## 2023-08-28 ENCOUNTER — OFFICE VISIT (OUTPATIENT)
Dept: FAMILY MEDICINE | Facility: CLINIC | Age: 63
End: 2023-08-28
Payer: COMMERCIAL

## 2023-08-28 VITALS
OXYGEN SATURATION: 96 % | BODY MASS INDEX: 18.96 KG/M2 | WEIGHT: 160.6 LBS | TEMPERATURE: 98.6 F | HEIGHT: 77 IN | SYSTOLIC BLOOD PRESSURE: 127 MMHG | DIASTOLIC BLOOD PRESSURE: 70 MMHG | RESPIRATION RATE: 22 BRPM

## 2023-08-28 DIAGNOSIS — Z00.00 ENCOUNTER FOR MEDICARE ANNUAL WELLNESS EXAM: Primary | ICD-10-CM

## 2023-08-28 DIAGNOSIS — F10.90 HEAVY ALCOHOL USE: ICD-10-CM

## 2023-08-28 DIAGNOSIS — Z23 NEED FOR SHINGLES VACCINE: ICD-10-CM

## 2023-08-28 DIAGNOSIS — Z13.220 LIPID SCREENING: ICD-10-CM

## 2023-08-28 LAB
ALBUMIN SERPL BCG-MCNC: 4.7 G/DL (ref 3.5–5.2)
ALP SERPL-CCNC: 83 U/L (ref 40–129)
ALT SERPL W P-5'-P-CCNC: 28 U/L (ref 0–70)
ANION GAP SERPL CALCULATED.3IONS-SCNC: 11 MMOL/L (ref 7–15)
AST SERPL W P-5'-P-CCNC: 37 U/L (ref 0–45)
BILIRUB SERPL-MCNC: 1.5 MG/DL
BUN SERPL-MCNC: 17.8 MG/DL (ref 8–23)
CALCIUM SERPL-MCNC: 9.8 MG/DL (ref 8.8–10.2)
CHLORIDE SERPL-SCNC: 101 MMOL/L (ref 98–107)
CHOLEST SERPL-MCNC: 176 MG/DL
CREAT SERPL-MCNC: 0.9 MG/DL (ref 0.67–1.17)
DEPRECATED HCO3 PLAS-SCNC: 26 MMOL/L (ref 22–29)
GFR SERPL CREATININE-BSD FRML MDRD: >90 ML/MIN/1.73M2
GGT SERPL-CCNC: 30 U/L (ref 8–61)
GLUCOSE SERPL-MCNC: 106 MG/DL (ref 70–99)
HDLC SERPL-MCNC: 85 MG/DL
LDLC SERPL CALC-MCNC: 76 MG/DL
NONHDLC SERPL-MCNC: 91 MG/DL
POTASSIUM SERPL-SCNC: 4.3 MMOL/L (ref 3.4–5.3)
PROT SERPL-MCNC: 7.3 G/DL (ref 6.4–8.3)
SODIUM SERPL-SCNC: 138 MMOL/L (ref 136–145)
TRIGL SERPL-MCNC: 75 MG/DL

## 2023-08-28 PROCEDURE — 80053 COMPREHEN METABOLIC PANEL: CPT | Performed by: FAMILY MEDICINE

## 2023-08-28 PROCEDURE — 36415 COLL VENOUS BLD VENIPUNCTURE: CPT | Performed by: FAMILY MEDICINE

## 2023-08-28 PROCEDURE — G0009 ADMIN PNEUMOCOCCAL VACCINE: HCPCS | Performed by: FAMILY MEDICINE

## 2023-08-28 PROCEDURE — 90677 PCV20 VACCINE IM: CPT | Performed by: FAMILY MEDICINE

## 2023-08-28 PROCEDURE — G0438 PPPS, INITIAL VISIT: HCPCS | Performed by: FAMILY MEDICINE

## 2023-08-28 PROCEDURE — 82977 ASSAY OF GGT: CPT | Performed by: FAMILY MEDICINE

## 2023-08-28 PROCEDURE — 80061 LIPID PANEL: CPT | Performed by: FAMILY MEDICINE

## 2023-08-28 ASSESSMENT — ENCOUNTER SYMPTOMS
DIZZINESS: 0
JOINT SWELLING: 0
COUGH: 0
DYSURIA: 0
PARESTHESIAS: 0
HEADACHES: 0
SHORTNESS OF BREATH: 0
HEMATOCHEZIA: 0
CHILLS: 0
HEMATURIA: 0
HEARTBURN: 0
DIARRHEA: 0
MYALGIAS: 0
FREQUENCY: 0
SORE THROAT: 0
FEVER: 0
PALPITATIONS: 0
NERVOUS/ANXIOUS: 0
EYE PAIN: 0
WEAKNESS: 0
ARTHRALGIAS: 0
NAUSEA: 0
ABDOMINAL PAIN: 0
CONSTIPATION: 0

## 2023-08-28 ASSESSMENT — ACTIVITIES OF DAILY LIVING (ADL): CURRENT_FUNCTION: NO ASSISTANCE NEEDED

## 2023-08-28 NOTE — PROGRESS NOTES
SUBJECTIVE:   Gomez is a 63 year old who presents for Preventive Visit.      8/28/2023     4:05 PM   Additional Questions   Roomed by Mariaelena   Accompanied by wife       Are you in the first 12 months of your Medicare coverage?  No    HPI      Have you ever done Advance Care Planning? (For example, a Health Directive, POLST, or a discussion with a medical provider or your loved ones about your wishes): No, advance care planning information given to patient to review.  Patient declined advance care planning discussion at this time.     Fall risk, patient is at risk for falls due to his left foot drop.  He is very conscientious about lifting that foot by bending the knee and raising the leg higher so that the foot does not catch but he still does stumble occasionally.  He has not had a fall years now.  He stumbles frequently though.     click delete button to remove this line now  Cognitive Screening   1) Repeat 3 items (Leader, Season, Table)    2) Clock draw: NORMAL  3) 3 item recall: Recalls 3 objects  Results: 3 items recalled: COGNITIVE IMPAIRMENT LESS LIKELY    Mini-CogTM Copyright S Gareth. Licensed by the author for use in [facility]; reprinted with permission (soob@[facility]). All rights reserved.      Do you have sleep apnea, excessive snoring or daytime drowsiness? : no    Reviewed and updated as needed this visit by clinical staff   Tobacco  Allergies               Reviewed and updated as needed this visit by Provider                 Social History     Tobacco Use     Smoking status: Every Day     Packs/day: 1.00     Years: 45.00     Pack years: 45.00     Types: Cigarettes     Smokeless tobacco: Never     Tobacco comments:      wants to quit   Substance Use Topics     Alcohol use: Yes     Alcohol/week: 6.0 standard drinks of alcohol     Types: 6 Cans of beer per week     Comment: 4-6 per day             8/28/2023     3:42 PM   Alcohol Use   Prescreen: >3 drinks/day or >7 drinks/week? Yes    AUDIT SCORE  7   I  Do you have a current opioid prescription?  No     Do you use any other controlled substances or medications that are not prescribed by a provider? None          PROBLEMS TO ADD ON...  He is drinking more than he should.  He has upwards of 6-8 beers per day and occasionally will have 2 or 3 shots of fireball.  He admits to excessive drinking and this does bother his wife quite a bit and does increase her stress levels.  She is with him today and she does get tearful.  He has drank heavily like this for years a lot to deal with his chronic back pain and botched surgeries from his Workmen's Comp. injury.  He functions very well and has never had a DUI.  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  He does not drink during his workday.  His wife does state that he gets irritable and angry when he drinks fireball.  That is what is most bothersome to her.  I did discuss this with him and other to have custody of his 8-year-old grandson I said if not for himself he should really limit his drinking particularly now that they are caring for an 8-year-old.  The patient does understand my concerns.  I am not convinced he will curb his drinking.    Current providers sharing in care for this patient include:   Patient Care Team:  Francis Lane MD as PCP - General  Francis Lane MD as Assigned PCP    The following health maintenance items are reviewed in Epic and correct as of today:  Health Maintenance   Topic Date Due     ZOSTER IMMUNIZATION (2 of 2) 03/14/2019     Pneumococcal Vaccine: Pediatrics (0 to 5 Years) and At-Risk Patients (6 to 64 Years) (2 - PCV) 01/17/2020     COVID-19 Vaccine (4 - Moderna series) 03/24/2022     NICOTINE/TOBACCO CESSATION COUNSELING Q 1 YR  04/18/2023     MEDICARE ANNUAL WELLNESS VISIT  04/18/2023     ANNUAL REVIEW OF HM ORDERS  04/18/2023     LIPID  12/10/2023     LUNG CANCER SCREENING  05/18/2024     ADVANCE CARE PLANNING   10/01/2024     COLORECTAL CANCER SCREENING  02/18/2030     DTAP/TDAP/TD IMMUNIZATION (3 - Td or Tdap) 04/18/2032     PHQ-2 (once per calendar year)  Completed     IPV IMMUNIZATION  Aged Out     MENINGITIS IMMUNIZATION  Aged Out     URINE DRUG SCREEN  Discontinued     HEPATITIS C SCREENING  Discontinued     HIV SCREENING  Discontinued     INFLUENZA VACCINE  Discontinued     Lab work is in process  Labs reviewed in EPIC  BP Readings from Last 3 Encounters:   08/28/23 127/70   01/20/23 118/74   11/01/22 122/68    Wt Readings from Last 3 Encounters:   08/28/23 72.8 kg (160 lb 9.6 oz)   01/20/23 73.9 kg (162 lb 14.4 oz)   11/01/22 73.5 kg (162 lb)                  Patient Active Problem List   Diagnosis     Tobacco use disorder     Psychosexual dysfunction with inhibited sexual excitement     Other male erectile dysfunction     iamLUMBAR DISC DISPLACEMENT     iamPOSTSURGICAL STATES NEC     Obstructive sleep apnea     Neuropathic pain syndrome (non-herpetic)     CARDIOVASCULAR SCREENING; LDL GOAL LESS THAN 160     Foot drop, left     Lumbar radiculopathy     Chronic midline low back pain with sciatica, sciatica laterality unspecified     Personal history of nicotine dependence      Glaucoma suspect, bilateral     Discoloration of skin of hand (bilateral) hands turn white then red and purple when cold with burning sensation     Back muscle spasm     Acute medial meniscus tear of right knee, subsequent encounter     Traumatic complete tear of left rotator cuff     Bicipital tendonitis of left shoulder     Other insomnia     Past Surgical History:   Procedure Laterality Date     ARTHROSCOPY KNEE WITH MEDIAL MENISCECTOMY Right 2/24/2020    Procedure: ARTHROSCOPY, KNEE, WITH MEDIAL MENISCECTOMY right;  Surgeon: Eliot Bañuelos MD;  Location: [facility]     ARTHROSCOPY SHOULDER Left 8/11/2021    Procedure: Arthroscopy shoulder left ;  Surgeon: Fercho Guerin DO;  Location: [facility] OR     ARTHROTOMY SHOULDER, ROTATOR CUFF REPAIR,  COMBINED Left 2021    Procedure: ARTHROTOMY, SHOULDER, WITH ROTATOR CUFF REPAIR;  Surgeon: Fercho Guerin, DO;  Location: [facility] OR     BACK SURGERY  2015    cleaned out bone spurs     COLONOSCOPY N/A 2020    Procedure: Colonoscopy;  Surgeon: Juan Beltrán, ;  Location: [facility]     HC REMOVE TONSILS/ADENOIDS,<13 Y/O      T & A <11y.o.     LAMINECT/DISCECTOMY, LUMBAR  10/19/06    L3-4     LAMINECT/DISCECTOMY, LUMBAR  11/10    left sided L4-5     REMOVAL OF SPERM DUCT(S)      Vasectomy     ROTATOR CUFF REPAIR RT/LT  3/12    right sided     ZZC ECHO HEART XTHORACIC,COMPLETE, W/O DOPPLER  07    normal cardiac stress echo test     ZZC ESTEBAN W/O FACETEC FORAMOT/DSKC  VRT SEG, LUMBAR      L4-5     ZZC REPAIR LUMBAR HERNIA      L5-4 L5-S1     ZZHC COLONOSCOPY W BIOPSY  09       Social History     Tobacco Use     Smoking status: Every Day     Packs/day: 1.00     Years: 45.00     Pack years: 45.00     Types: Cigarettes     Smokeless tobacco: Never     Tobacco comments:      wants to quit   Substance Use Topics     Alcohol use: Yes     Alcohol/week: 6.0 standard drinks of alcohol     Types: 6 Cans of beer per week     Comment: 4-6 per day     Family History   Problem Relation Age of Onset     Arthritis Father      Depression Father      Heart Failure Father 80        CHF     Respiratory Mother         emphysema     Diabetes Mother      Other - See Comments Brother 21         in an MVA (oldest sibling     Other - See Comments Brother         older  all other siblings are 1/2      Other - See Comments Brother         older     Heart Disease Brother      Other - See Comments Brother         older     Other - See Comments Brother         older     Other - See Comments Brother         younger     Other - See Comments Brother 21        motorcycle injury, Brain death     Other - See Comments Brother         full sibling but given up for adoption 1 yr older     Other - See Comments  "Grandchild         3 grand daughters 1 grand son         Current Outpatient Medications   Medication Sig Dispense Refill     ketorolac (TORADOL) 10 MG tablet Take 1 tablet (10 mg) by mouth every 6 hours as needed for moderate pain 30 tablet 0     latanoprost (XALATAN) 0.005 % ophthalmic solution INSTILL 1 DROP INTO EACH EYE EVERY DAY AT BEDTIME       Allergies   Allergen Reactions     No Known Drug Allergy      Recent Labs   Lab Test 08/28/23  1706 05/17/21  1539 08/03/20  1029 12/10/18  1237   LDL 76  --   --  57   HDL 85  --   --  74   TRIG 75  --   --  43   ALT 28 33 33  --    CR 0.90  --  0.83 0.85   GFRESTIMATED >90  --  >90 >90   GFRESTBLACK  --   --  >90 >90   POTASSIUM 4.3  --  4.6 4.5        He did get his Prevnar 20 vaccine today.    Review of Systems  Constitutional, HEENT, cardiovascular, pulmonary, GI, , musculoskeletal, neuro, skin, endocrine and psych systems are negative, except as otherwise noted.    OBJECTIVE:   /70   Temp 98.6  F (37  C)   Resp 22   Ht 1.96 m (6' 5.17\")   Wt 72.8 kg (160 lb 9.6 oz)   SpO2 96%   BMI 18.96 kg/m   Estimated body mass index is 18.96 kg/m  as calculated from the following:    Height as of this encounter: 1.96 m (6' 5.17\").    Weight as of this encounter: 72.8 kg (160 lb 9.6 oz).  Physical Exam  GENERAL: healthy, alert and no distress  EYES: Eyes grossly normal to inspection, PERRL and conjunctivae and sclerae normal  HENT: ear canals and TM's normal, nose and mouth without ulcers or lesions  NECK: no adenopathy, no asymmetry, masses, or scars and thyroid normal to palpation  RESP: lungs clear to auscultation - no rales, rhonchi or wheezes  CV: regular rate and rhythm, normal S1 S2, no S3 or S4, no murmur, click or rub, no peripheral edema and peripheral pulses strong  ABDOMEN: soft, nontender, no hepatosplenomegaly, no masses and bowel sounds normal   (male): not indicated  RECTAL: not indicated   MS: Moves all extremities equally well.  He continues to " have a left foot drop on the left from his permanent nerve injury.  His Workmen's Comp. injury was back in 2004 I believe.  SKIN: no suspicious lesions or rashes  NEURO: Normal strength and tone, mentation intact and speech normal  PSYCH: mentation appears normal, affect normal/bright    Diagnostic Test Results:  Labs reviewed in Epic  Results for orders placed or performed in visit on 08/28/23   Lipid panel reflex to direct LDL Fasting     Status: Normal   Result Value Ref Range    Cholesterol 176 <200 mg/dL    Triglycerides 75 <150 mg/dL    Direct Measure HDL 85 >=40 mg/dL    LDL Cholesterol Calculated 76 <=100 mg/dL    Non HDL Cholesterol 91 <130 mg/dL    Narrative    Cholesterol  Desirable:  <200 mg/dL    Triglycerides  Normal:  Less than 150 mg/dL  Borderline High:  150-199 mg/dL  High:  200-499 mg/dL  Very High:  Greater than or equal to 500 mg/dL    Direct Measure HDL  Female:  Greater than or equal to 50 mg/dL   Male:  Greater than or equal to 40 mg/dL    LDL Cholesterol  Desirable:  <100mg/dL  Above Desirable:  100-129 mg/dL   Borderline High:  130-159 mg/dL   High:  160-189 mg/dL   Very High:  >= 190 mg/dL    Non HDL Cholesterol  Desirable:  130 mg/dL  Above Desirable:  130-159 mg/dL  Borderline High:  160-189 mg/dL  High:  190-219 mg/dL  Very High:  Greater than or equal to 220 mg/dL   Comprehensive metabolic panel (BMP + Alb, Alk Phos, ALT, AST, Total. Bili, TP)     Status: Abnormal   Result Value Ref Range    Sodium 138 136 - 145 mmol/L    Potassium 4.3 3.4 - 5.3 mmol/L    Chloride 101 98 - 107 mmol/L    Carbon Dioxide (CO2) 26 22 - 29 mmol/L    Anion Gap 11 7 - 15 mmol/L    Urea Nitrogen 17.8 8.0 - 23.0 mg/dL    Creatinine 0.90 0.67 - 1.17 mg/dL    Calcium 9.8 8.8 - 10.2 mg/dL    Glucose 106 (H) 70 - 99 mg/dL    Alkaline Phosphatase 83 40 - 129 U/L    AST 37 0 - 45 U/L    ALT 28 0 - 70 U/L    Protein Total 7.3 6.4 - 8.3 g/dL    Albumin 4.7 3.5 - 5.2 g/dL    Bilirubin Total 1.5 (H) <=1.2 mg/dL    GFR  Estimate >90 >60 mL/min/1.73m2   GGT     Status: Normal   Result Value Ref Range    GGT 30 8 - 61 U/L       ASSESSMENT / PLAN:   (Z00.00) Encounter for Medicare annual wellness exam  (primary encounter diagnosis)  Comment: Yearly Medicare exam.  Plan: Patient is stable but continues to drink heavily.    (F10.90) Heavy alcohol use  Comment: Drinking upwards of 6-8 beers per day and sometimes adding in 2 or 3 shots of fireball.  Plan: GGT        I did advise him about what is considered moderate alcohol intake 2 beers per day, 2 glasses of wine or 2 shots of hard liquor.  He knows he drinks too much but does not appear to be willing to repeat his intake.  We did talk about trying to curb the fireball intake since that is what seems to make him more irritable and angry.    (Z23) Need for shingles vaccine  Comment: He is due for his shingles booster but since he is on Medicare he needs to get this at the pharmacy.  He gets a refill he will get his shingles vaccine.  Plan: His wife is with so she will make sure that he gets his vaccination.    (Z13.220) Lipid screening  Comment: Due for lipid screening  Plan: Lipid panel reflex to direct LDL Fasting,         Comprehensive metabolic panel (BMP + Alb, Alk         Phos, ALT, AST, Total. Bili, TP)        Labs were obtained.    Patient has been advised of split billing requirements and indicates understanding: Yes      COUNSELING:  Reviewed preventive health counseling, as reflected in patient instructions       Regular exercise       Healthy diet/nutrition       Vision screening       Fall risk prevention       Immunizations  Vaccinated for: Pneumococcal, he will get his zoster booster at the pharmacy       Alcohol Use     He reports that he has been smoking cigarettes. He has a 45.00 pack-year smoking history. He has never used smokeless tobacco.  Nicotine/Tobacco Cessation Plan:   Information offered: Patient not interested at this time      Appropriate preventive services  were discussed with this patient, including applicable screening as appropriate for cardiovascular disease, diabetes, osteopenia/osteoporosis, and glaucoma.  As appropriate for age/gender, discussed screening for colorectal cancer, prostate cancer, breast cancer, and cervical cancer. Checklist reviewing preventive services available has been given to the patient.    Reviewed patients plan of care and provided an AVS. The Basic Care Plan (routine screening as documented in Health Maintenance) for Gomez meets the Care Plan requirement. This Care Plan has been established and reviewed with the Patient and spouse .          Electronically signed by:  Francis Lane M.D.  8/28/2023    Identified Health Risks:    The patient reports that he drinks more than one alcoholic drink per day and sometimes engages in binge or excessive drinking. He was counseled and given information about possible harmful effects of excessive alcohol intake as well as where to get help for alcohol problems.

## 2023-08-28 NOTE — NURSING NOTE
Prior to immunization administration, verified patients identity using patient s name and date of birth. Please see Immunization Activity for additional information.     Screening Questionnaire for Adult Immunization    Are you sick today?   No   Do you have allergies to medications, food, a vaccine component or latex?   No   Have you ever had a serious reaction after receiving a vaccination?   No   Do you have a long-term health problem with heart, lung, kidney, or metabolic disease (e.g., diabetes), asthma, a blood disorder, no spleen, complement component deficiency, a cochlear implant, or a spinal fluid leak?  Are you on long-term aspirin therapy?   No   Do you have cancer, leukemia, HIV/AIDS, or any other immune system problem?   No   Do you have a parent, brother, or sister with an immune system problem?   No   In the past 3 months, have you taken medications that affect  your immune system, such as prednisone, other steroids, or anticancer drugs; drugs for the treatment of rheumatoid arthritis, Crohn s disease, or psoriasis; or have you had radiation treatments?   No   Have you had a seizure, or a brain or other nervous system problem?   No   During the past year, have you received a transfusion of blood or blood    products, or been given immune (gamma) globulin or antiviral drug?   No   For women: Are you pregnant or is there a chance you could become       pregnant during the next month?   No   Have you received any vaccinations in the past 4 weeks?   No     Immunization questionnaire answers were all negative.      Patient instructed to remain in clinic for 15 minutes afterwards, and to report any adverse reactions.     Screening performed by Shirin Waddell MA on 8/28/2023 at 4:56 PM.

## 2023-08-30 PROBLEM — F10.90 HEAVY ALCOHOL USE: Status: ACTIVE | Noted: 2023-08-30

## 2023-08-31 NOTE — PATIENT INSTRUCTIONS
Patient Education   Personalized Prevention Plan  You are due for the preventive services outlined below.  Your care team is available to assist you in scheduling these services.  If you have already completed any of these items, please share that information with your care team to update in your medical record.  Health Maintenance Due   Topic Date Due     Zoster (Shingles) Vaccine (2 of 2) 03/14/2019     COVID-19 Vaccine (4 - Moderna series) 03/24/2022     Annual Wellness Visit  04/18/2023      Patient Education   Alcohol Use     Many people can enjoy a glass of wine or beer without any negative consequences to their health. According to the Centers for Disease Control and Prevention (CDC), having one or fewer drinks per day for women and two or fewer per day for men is considered moderate drinking.     When people drink more than moderately, it can become concerning. Excessive drinking is defined as consuming 15 drinks or more per week for men and 8 drinks or more per week for women. There are various health problems associated with excessive drinking, which include:  Damage to vital organs like the heart, brain, liver and pancreas  Harm to the digestive tract  Weaken the immune system  Higher risk for heart disease and cancer    There are many resources available to people who are addicted to alcohol. A counselor or other health care provider can give you support. So can a , , or rabbi who is trained in substance abuse counseling. Friends and family may also help once you are connected with professionals.

## 2024-05-23 ENCOUNTER — TELEPHONE (OUTPATIENT)
Dept: FAMILY MEDICINE | Facility: CLINIC | Age: 64
End: 2024-05-23
Payer: COMMERCIAL

## 2024-05-23 NOTE — TELEPHONE ENCOUNTER
I spoke with his wife and she wanted him to be fit in just one more time.  I told her he is unable to and that he needs to establish care with another provider.

## 2024-05-23 NOTE — TELEPHONE ENCOUNTER
Patient Returning Call    Reason for call:  Want to make appt with provider however the provider schedule is not pulling the pt will like to talk to someone on the care team    Information relayed to patient:      Patient has additional questions:  Yes    What are your questions/concerns:  above    Who does the patient want to speak with:      Is an  needed?:  No      Could we send this information to you in [employer] or would you prefer to receive a phone call?:   Patient would prefer a phone call   Okay to leave a detailed message?: Yes at Cell number on file:    Telephone Information:   Mobile [phone]

## 2024-06-13 DIAGNOSIS — Z98.890 S/P ARTHROSCOPY OF LEFT SHOULDER: ICD-10-CM

## 2024-06-13 DIAGNOSIS — Z98.890 S/P LEFT ROTATOR CUFF REPAIR: ICD-10-CM

## 2024-06-18 RX ORDER — KETOROLAC TROMETHAMINE 10 MG/1
10 TABLET, FILM COATED ORAL EVERY 6 HOURS PRN
Qty: 30 TABLET | Refills: 0 | Status: SHIPPED | OUTPATIENT
Start: 2024-06-18

## 2024-07-29 ENCOUNTER — PATIENT OUTREACH (OUTPATIENT)
Dept: CARE COORDINATION | Facility: CLINIC | Age: 64
End: 2024-07-29
Payer: COMMERCIAL

## 2024-08-12 ENCOUNTER — PATIENT OUTREACH (OUTPATIENT)
Dept: CARE COORDINATION | Facility: CLINIC | Age: 64
End: 2024-08-12
Payer: COMMERCIAL

## 2024-10-12 ENCOUNTER — HEALTH MAINTENANCE LETTER (OUTPATIENT)
Age: 64
End: 2024-10-12

## 2025-02-24 ENCOUNTER — PATIENT OUTREACH (OUTPATIENT)
Dept: CARE COORDINATION | Facility: CLINIC | Age: 65
End: 2025-02-24
Payer: COMMERCIAL

## 2025-04-24 ENCOUNTER — TELEPHONE (OUTPATIENT)
Dept: FAMILY MEDICINE | Facility: CLINIC | Age: 65
End: 2025-04-24

## 2025-04-24 NOTE — LETTER
May 15, 2025    [address]    [address]   [facility] [address]    Hello,     Your care team at [facility] values your health and well-being. After reviewing your chart, we have identified recommendation(s) to help you better manage your health.    It's time for your Medicare AWV. During your visit, we'll discuss your health, well-being, and any questions you may have related to preventive care. We'll also review any recommended tests, exams, or screenings you might need. To schedule please call [phone] ([phone]) or use your [employer] account.    If you recently had or are having any of these services soon, please contact the clinic via phone or [employer] so that your care team can update your records.    We look forward to seeing you at your upcoming visit.    If you have any questions or concerns, please contact our clinic. Thank you for continuing to trust us with your care.    Sincerely,    Your [facility] Care Team            Electronically signed

## 2025-04-24 NOTE — TELEPHONE ENCOUNTER
Patient Quality Outreach    Patient is due for the following:   Physical Annual Wellness Visit    Action(s) Taken:   Schedule a Annual Wellness Visit    Type of outreach:    Sent [employer] message.    Questions for provider review:    None         Kamryn Valdez MA

## 2025-05-15 NOTE — TELEPHONE ENCOUNTER
Patient Quality Outreach    Patient is due for the following:   Physical Annual Wellness Visit    Action(s) Taken:   Schedule a Annual Wellness Visit    Type of outreach:    Sent letter.    Questions for provider review:    None         Kamryn Valdez MA

## (undated) DEVICE — PREP POVIDONE IODINE SWABS X3

## (undated) DEVICE — SU STRATAFIX PDS PLUS CT-1 30CM SXPP1A435

## (undated) DEVICE — SU MONOCRYL 3-0 PS-2 27" Y427H

## (undated) DEVICE — DRSG GAUZE 4X4" TRAY

## (undated) DEVICE — ADH SKIN CLOSURE PREMIERPRO EXOFIN 1.0ML 3470

## (undated) DEVICE — DRAPE MAYO STAND 23X54 8337

## (undated) DEVICE — TUBING SUCTION 12"X1/4" N612

## (undated) DEVICE — ESU PENCIL SMOKE EVAC W/ROCKER SWITCH 0703-047-000

## (undated) DEVICE — CONNECTOR ONE-LINK INJECTION SITE LF 7N8399

## (undated) DEVICE — GLOVE ORTHO 7.5 LATEX 5721314

## (undated) DEVICE — SU STRATAFIX PDS PLUS 0 CT-2 9" SXPP1A446

## (undated) DEVICE — DRSG AQUACEL AG 3.5X6.0" HYDROFIBER 412010

## (undated) DEVICE — SYR 50ML LL W/O NDL 309653

## (undated) DEVICE — SU VICRYL 2-0 CT-2 27" UND J269H

## (undated) DEVICE — BLADE DYONICS INCISOR PLUS ELITE 3.5MM 72200095

## (undated) DEVICE — DRAPE STERI U 1015

## (undated) DEVICE — GLOVE DURAPRENE 8 2D72A5I

## (undated) DEVICE — PACK KNEE ARTHROSCOPY CUSTOM

## (undated) DEVICE — STRAP STIRRUP W/SLIP 30187-030

## (undated) DEVICE — SUCTION TIP YANKAUER W/O VENT BULBOUS TIP

## (undated) DEVICE — PACK OPEN SHOULDER SOP15OCFSC

## (undated) DEVICE — BASIN SET MINOR DISP

## (undated) DEVICE — BNDG COBAN 4"X5YDS STERILE

## (undated) DEVICE — SU NDL MAYO TROCAR 217004

## (undated) DEVICE — SYR 20ML LL W/O NDL 302830

## (undated) DEVICE — TUBING INFLOW CROSSFLOW 0450-000-100

## (undated) DEVICE — GLOVE PROTEXIS W/NEU-THERA 7.5  2D73TE75

## (undated) DEVICE — IMM SHOULDER MED 79-84015

## (undated) DEVICE — SOL NACL 0.9% IRRIG 3000ML BAG 07972-08

## (undated) DEVICE — DRAPE STERI TOWEL SM 1000

## (undated) DEVICE — GLOVE PROTEXIS W/NEU-THERA 8.0  2D73TE80

## (undated) DEVICE — PREP CHLORAPREP 26ML TINTED ORANGE  260815

## (undated) DEVICE — GLOVE PROTEXIS BLUE W/NEU-THERA 8.0  2D73EB80

## (undated) DEVICE — BLADE KNIFE SURG 15 371115

## (undated) RX ORDER — DEXAMETHASONE SODIUM PHOSPHATE 10 MG/ML
INJECTION, SOLUTION INTRAMUSCULAR; INTRAVENOUS
Status: DISPENSED
Start: 2020-02-24

## (undated) RX ORDER — HYDROMORPHONE HYDROCHLORIDE 1 MG/ML
INJECTION, SOLUTION INTRAMUSCULAR; INTRAVENOUS; SUBCUTANEOUS
Status: DISPENSED
Start: 2020-02-24

## (undated) RX ORDER — FENTANYL CITRATE 50 UG/ML
INJECTION, SOLUTION INTRAMUSCULAR; INTRAVENOUS
Status: DISPENSED
Start: 2020-02-24

## (undated) RX ORDER — LIDOCAINE HYDROCHLORIDE 20 MG/ML
INJECTION, SOLUTION EPIDURAL; INFILTRATION; INTRACAUDAL; PERINEURAL
Status: DISPENSED
Start: 2020-02-24

## (undated) RX ORDER — BUPIVACAINE HYDROCHLORIDE AND EPINEPHRINE 5; 5 MG/ML; UG/ML
INJECTION, SOLUTION EPIDURAL; INTRACAUDAL; PERINEURAL
Status: DISPENSED
Start: 2021-08-11

## (undated) RX ORDER — DEXAMETHASONE SODIUM PHOSPHATE 10 MG/ML
INJECTION, SOLUTION INTRAMUSCULAR; INTRAVENOUS
Status: DISPENSED
Start: 2021-08-11

## (undated) RX ORDER — FENTANYL CITRATE 50 UG/ML
INJECTION, SOLUTION INTRAMUSCULAR; INTRAVENOUS
Status: DISPENSED
Start: 2021-08-11

## (undated) RX ORDER — KETOROLAC TROMETHAMINE 30 MG/ML
INJECTION, SOLUTION INTRAMUSCULAR; INTRAVENOUS
Status: DISPENSED
Start: 2021-08-11

## (undated) RX ORDER — ONDANSETRON 2 MG/ML
INJECTION INTRAMUSCULAR; INTRAVENOUS
Status: DISPENSED
Start: 2020-02-24

## (undated) RX ORDER — PROPOFOL 10 MG/ML
INJECTION, EMULSION INTRAVENOUS
Status: DISPENSED
Start: 2020-02-24

## (undated) RX ORDER — LIDOCAINE HYDROCHLORIDE AND EPINEPHRINE 10; 10 MG/ML; UG/ML
INJECTION, SOLUTION INFILTRATION; PERINEURAL
Status: DISPENSED
Start: 2021-08-11

## (undated) RX ORDER — ONDANSETRON 2 MG/ML
INJECTION INTRAMUSCULAR; INTRAVENOUS
Status: DISPENSED
Start: 2021-08-11